# Patient Record
Sex: FEMALE | Race: WHITE | NOT HISPANIC OR LATINO | Employment: FULL TIME | URBAN - METROPOLITAN AREA
[De-identification: names, ages, dates, MRNs, and addresses within clinical notes are randomized per-mention and may not be internally consistent; named-entity substitution may affect disease eponyms.]

---

## 2017-08-12 ENCOUNTER — HOSPITAL ENCOUNTER (EMERGENCY)
Facility: HOSPITAL | Age: 77
Discharge: HOME | End: 2017-08-12
Attending: FAMILY MEDICINE
Payer: MEDICARE

## 2017-08-12 VITALS
RESPIRATION RATE: 16 BRPM | DIASTOLIC BLOOD PRESSURE: 85 MMHG | HEIGHT: 63 IN | OXYGEN SATURATION: 96 % | HEART RATE: 72 BPM | BODY MASS INDEX: 22.86 KG/M2 | WEIGHT: 129 LBS | SYSTOLIC BLOOD PRESSURE: 179 MMHG | TEMPERATURE: 98 F

## 2017-08-12 DIAGNOSIS — N39.0 URINARY TRACT INFECTION WITH HEMATURIA, SITE UNSPECIFIED: Primary | ICD-10-CM

## 2017-08-12 DIAGNOSIS — R31.9 URINARY TRACT INFECTION WITH HEMATURIA, SITE UNSPECIFIED: Primary | ICD-10-CM

## 2017-08-12 LAB
BACTERIA #/AREA URNS AUTO: ABNORMAL /HPF
BILIRUB UR QL STRIP: NEGATIVE
CLARITY UR REFRACT.AUTO: ABNORMAL
COLOR UR AUTO: ABNORMAL
GLUCOSE UR QL STRIP: NEGATIVE
HGB UR QL STRIP: ABNORMAL
HYALINE CASTS UR QL AUTO: 0 /LPF
KETONES UR QL STRIP: NEGATIVE
LEUKOCYTE ESTERASE UR QL STRIP: ABNORMAL
MICROSCOPIC COMMENT: ABNORMAL
NITRITE UR QL STRIP: NEGATIVE
PH UR STRIP: 5 [PH] (ref 5–8)
PROT UR QL STRIP: ABNORMAL
RBC #/AREA URNS AUTO: >100 /HPF (ref 0–4)
SP GR UR STRIP: 1.01 (ref 1–1.03)
URN SPEC COLLECT METH UR: ABNORMAL
UROBILINOGEN UR STRIP-ACNC: NEGATIVE EU/DL
WBC #/AREA URNS AUTO: >100 /HPF (ref 0–5)

## 2017-08-12 PROCEDURE — 81000 URINALYSIS NONAUTO W/SCOPE: CPT

## 2017-08-12 PROCEDURE — 25000003 PHARM REV CODE 250: Performed by: FAMILY MEDICINE

## 2017-08-12 PROCEDURE — 87086 URINE CULTURE/COLONY COUNT: CPT

## 2017-08-12 PROCEDURE — 99283 EMERGENCY DEPT VISIT LOW MDM: CPT | Mod: 25

## 2017-08-12 PROCEDURE — 96372 THER/PROPH/DIAG INJ SC/IM: CPT

## 2017-08-12 PROCEDURE — 63600175 PHARM REV CODE 636 W HCPCS: Performed by: FAMILY MEDICINE

## 2017-08-12 RX ORDER — PHENAZOPYRIDINE HYDROCHLORIDE 200 MG/1
200 TABLET, FILM COATED ORAL 3 TIMES DAILY
Qty: 10 TABLET | Refills: 0 | Status: SHIPPED | OUTPATIENT
Start: 2017-08-12 | End: 2017-08-23

## 2017-08-12 RX ORDER — CEFTRIAXONE 1 G/1
1 INJECTION, POWDER, FOR SOLUTION INTRAMUSCULAR; INTRAVENOUS
Status: COMPLETED | OUTPATIENT
Start: 2017-08-12 | End: 2017-08-12

## 2017-08-12 RX ORDER — SULFAMETHOXAZOLE AND TRIMETHOPRIM 800; 160 MG/1; MG/1
1 TABLET ORAL
Status: COMPLETED | OUTPATIENT
Start: 2017-08-12 | End: 2017-08-12

## 2017-08-12 RX ORDER — SULFAMETHOXAZOLE AND TRIMETHOPRIM 800; 160 MG/1; MG/1
1 TABLET ORAL 2 TIMES DAILY
Qty: 20 TABLET | Refills: 0 | Status: SHIPPED | OUTPATIENT
Start: 2017-08-12 | End: 2017-08-23

## 2017-08-12 RX ORDER — CEPHALEXIN 500 MG/1
500 CAPSULE ORAL EVERY 12 HOURS
COMMUNITY
End: 2017-08-23

## 2017-08-12 RX ADMIN — CEFTRIAXONE SODIUM 1 G: 1 INJECTION, POWDER, FOR SOLUTION INTRAMUSCULAR; INTRAVENOUS at 10:08

## 2017-08-12 RX ADMIN — SULFAMETHOXAZOLE AND TRIMETHOPRIM 1 TABLET: 800; 160 TABLET ORAL at 10:08

## 2017-08-12 NOTE — ED PROVIDER NOTES
"Encounter Date: 8/12/2017       History     Chief Complaint   Patient presents with    Hematuria     Pt states has been being treated for a "bladder infection" and yesterday started with increased pain to lower abdomen and lower back.  Reports noticed bright red- pink blood in urine this am.      Patient complains of bladder pressure with increased frequency since 2 days.  She also complains of low back pain since last night.  Symptoms have worsened this morning so she came to the ER for evaluation.  Patient says she was treated with antibiotics for UTI 2weeks  Ago by Keflex.  Patient symptoms have improved but recurred since last 2 days.  Patient denies any fever, chills, Rigour.  No nausea or vomiting.  Patient says she does have some blood in the urine and has seen by Dr. Santana in the past had CAT scan and cystoscopy and couldn't find nothing.  But the blood in the urine has increased this morning.      The history is provided by the patient.     Review of patient's allergies indicates:  No Known Allergies  Past Medical History:   Diagnosis Date    Atrial fibrillation     Hypertension      Past Surgical History:   Procedure Laterality Date    HYSTERECTOMY      TONSILLECTOMY       Family History   Problem Relation Age of Onset    Heart attack Father     Heart disease Brother     Heart disease Sister      Social History   Substance Use Topics    Smoking status: Never Smoker    Smokeless tobacco: Never Used    Alcohol use No     Review of Systems   Constitutional: Negative for activity change, chills and fever.   HENT: Negative for congestion and sore throat.    Eyes: Negative for pain, discharge, redness and itching.   Respiratory: Negative for cough, chest tightness, shortness of breath and wheezing.    Cardiovascular: Negative for chest pain and palpitations.   Gastrointestinal: Negative for abdominal distention, abdominal pain, diarrhea, nausea and vomiting.   Genitourinary: Positive for dysuria, " frequency, hematuria and urgency. Negative for decreased urine volume, difficulty urinating, flank pain, vaginal bleeding, vaginal discharge and vaginal pain.   Musculoskeletal: Negative for back pain.   Skin: Negative for rash.   Neurological: Negative for dizziness, light-headedness and headaches.   Psychiatric/Behavioral: Negative for confusion, decreased concentration and hallucinations. The patient is not nervous/anxious.    All other systems reviewed and are negative.      Physical Exam     Initial Vitals [08/12/17 0831]   BP Pulse Resp Temp SpO2   (!) 170/92 73 18 98.6 °F (37 °C) 96 %      MAP       118         Physical Exam    Nursing note and vitals reviewed.  Constitutional: She appears well-developed and well-nourished.   HENT:   Head: Normocephalic.   Right Ear: External ear normal.   Left Ear: External ear normal.   Nose: Nose normal.   Mouth/Throat: Oropharynx is clear and moist.   Eyes: Conjunctivae and EOM are normal. Pupils are equal, round, and reactive to light.   Neck: Normal range of motion.   Cardiovascular: Normal rate, regular rhythm, normal heart sounds and intact distal pulses.   Pulmonary/Chest: Breath sounds normal. No respiratory distress. She has no wheezes. She has no rhonchi. She has no rales. She exhibits no tenderness.   Abdominal: Soft. Bowel sounds are normal. She exhibits no distension. There is tenderness in the suprapubic area. There is no rigidity, no rebound, no guarding and no CVA tenderness.       Musculoskeletal: Normal range of motion.   Neurological: She is alert. She has normal strength and normal reflexes. She displays normal reflexes. No cranial nerve deficit or sensory deficit.   Skin: Skin is warm. Capillary refill takes less than 2 seconds.   Psychiatric: She has a normal mood and affect. Thought content normal.         ED Course   Procedures  Labs Reviewed   URINALYSIS - Abnormal; Notable for the following:        Result Value    Appearance, UA Cloudy (*)      Protein, UA 1+ (*)     Occult Blood UA 3+ (*)     Leukocytes, UA 3+ (*)     All other components within normal limits   URINALYSIS MICROSCOPIC - Abnormal; Notable for the following:     RBC, UA >100 (*)     WBC, UA >100 (*)     All other components within normal limits   CULTURE, URINE             Medical Decision Making:   ED Management:  Patient is treated for urinary tract infection in the ER with Rocephin injection and Bactrim.  Patient will be given prescription for Bactrim and a urine will be sent out for culture.  Patient is advised to follow-up with the primary care physician for culture report and sensitivity in 2-3 days.  Advised to follow-up ER in case if she develops fever with chills and worsening pain.  Patient understands and verbalizes plan.  No questions pending.                   ED Course     Clinical Impression:   The encounter diagnosis was Urinary tract infection with hematuria, site unspecified.    Disposition:   Disposition: Discharged  Condition: Bahsir Desai MD  08/13/17 2027

## 2017-08-13 LAB — BACTERIA UR CULT: NO GROWTH

## 2017-08-23 ENCOUNTER — OFFICE VISIT (OUTPATIENT)
Dept: OBSTETRICS AND GYNECOLOGY | Facility: CLINIC | Age: 77
End: 2017-08-23
Payer: MEDICARE

## 2017-08-23 VITALS
DIASTOLIC BLOOD PRESSURE: 74 MMHG | HEIGHT: 64 IN | SYSTOLIC BLOOD PRESSURE: 116 MMHG | BODY MASS INDEX: 21.68 KG/M2 | WEIGHT: 127 LBS

## 2017-08-23 DIAGNOSIS — Z12.31 OTHER SCREENING MAMMOGRAM: Primary | ICD-10-CM

## 2017-08-23 DIAGNOSIS — R19.00 PELVIC MASS: ICD-10-CM

## 2017-08-23 PROCEDURE — G0101 CA SCREEN;PELVIC/BREAST EXAM: HCPCS | Mod: S$GLB,,, | Performed by: OBSTETRICS & GYNECOLOGY

## 2017-08-23 PROCEDURE — 99999 PR PBB SHADOW E&M-EST. PATIENT-LVL III: CPT | Mod: PBBFAC,,, | Performed by: OBSTETRICS & GYNECOLOGY

## 2017-08-23 RX ORDER — ASPIRIN 81 MG/1
81 TABLET ORAL DAILY
COMMUNITY

## 2017-08-23 NOTE — PROGRESS NOTES
CC: Annual check-up    SUBJECTIVE:   76 y.o. female   for annual routine Pap and checkup. No LMP recorded. Patient has had a hysterectomy..  She has no unusual complaints.    PCP is Elgin Murphy    Past Medical History:   Diagnosis Date    Atrial fibrillation     Hypertension      Past Surgical History:   Procedure Laterality Date    HYSTERECTOMY      TONSILLECTOMY       Social History     Social History    Marital status:      Spouse name: N/A    Number of children: N/A    Years of education: N/A     Occupational History    Not on file.     Social History Main Topics    Smoking status: Never Smoker    Smokeless tobacco: Never Used    Alcohol use No    Drug use: No    Sexual activity: Not Currently     Other Topics Concern    Not on file     Social History Narrative    No narrative on file     Family History   Problem Relation Age of Onset    Heart attack Father     Heart disease Brother     Heart disease Sister      OB History    Para Term  AB Living   2 1 1   1     SAB TAB Ectopic Multiple Live Births   1              # Outcome Date GA Lbr Rigoberto/2nd Weight Sex Delivery Anes PTL Lv   2 SAB            1 Term      Vag-Spont               Current Outpatient Prescriptions   Medication Sig Dispense Refill    aspirin (ECOTRIN) 81 MG EC tablet Take 81 mg by mouth once daily.      atorvastatin (LIPITOR) 10 MG tablet Take 10 mg by mouth once daily.      calcium-vitamin D3 500 mg(1,250mg) -200 unit per tablet Take 1 tablet by mouth 2 (two) times daily with meals.      fish oil-omega-3 fatty acids 300-1,000 mg capsule Take 2 g by mouth once daily.      metoprolol succinate (TOPROL-XL) 100 MG 24 hr tablet Take 100 mg by mouth once daily.      multivitamin with minerals tablet Take 1 tablet by mouth once daily.      potassium chloride SA (K-DUR,KLOR-CON) 20 MEQ tablet       triamterene-hydrochlorothiazide 37.5-25 mg (MAXZIDE-25) 37.5-25 mg per tablet Take 1 tablet by  "mouth once daily.       No current facility-administered medications for this visit.      Allergies: Review of patient's allergies indicates no known allergies.     ROS:  Constitutional: no weight loss, weight gain, fever, fatigue  Eyes:  No vision changes, glasses/contacts  ENT/Mouth: No ulcers, sinus problems, ears ringing, headache  Cardiovascular: No inability to lie flat, chest pain, exercise intolerance, swelling, heart palpitations  Respiratory: No wheezing, coughing blood, shortness of breath, or cough  Gastrointestinal: No diarrhea, bloody stool, nausea/vomiting, constipation, gas, hemorrhoids  Genitourinary: No blood in urine, painful urination, urgency of urination, frequency of urination, incomplete emptying, incontinence, abnormal bleeding, painful periods, heavy periods, vaginal discharge, vaginal odor, painful intercourse, sexual problems, bleeding after intercourse.  Musculoskeletal: No muscle weakness  Skin/Breast: No painful breasts, nipple discharge, masses, rash, ulcers  Neurological: No passing out, seizures, numbness, headache  Endocrine: No diabetes, hypothyroid, hyperthyroid, hot flashes, hair loss, abnormal hair growth, ance  Psychiatric: No depression, crying  Hematologic: No bruises, bleeding, swollen lymph nodes, anemia.      OBJECTIVE:   The patient appears well, alert, oriented x 3, in no distress.  /74   Ht 5' 4" (1.626 m)   Wt 57.6 kg (127 lb)   BMI 21.80 kg/m²   NECK: no thyromegaly, trachea midline  SKIN: no acne, striae, hirsutism  BREAST EXAM: breasts appear normal, no suspicious masses, no skin or nipple changes or axillary nodes  ABDOMEN: no hernias, masses, or hepatosplenomegaly  GENITALIA: normal external genitalia, no erythema, no discharge  URETHRA: normal urethra, normal urethral meatus  VAGINA: Normal  CERVIX: no lesions or cervical motion tenderness  UTERUS: normal  ADNEXA: fullness at apex of vaginal cuff, constipation vs ovarian mass??      ASSESSMENT:   well " woman  1. Other screening mammogram    2. Pelvic mass        PLAN:   mammogram  additional lab tests per orders  return annually or prn  Orders Placed This Encounter    Mammo Digital Screening Bilat With CAD    US Pelvis Comp with Transvag NON-OB (xpd

## 2017-09-01 ENCOUNTER — HOSPITAL ENCOUNTER (OUTPATIENT)
Dept: RADIOLOGY | Facility: HOSPITAL | Age: 77
Discharge: HOME | End: 2017-09-01
Attending: OBSTETRICS & GYNECOLOGY
Payer: MEDICARE

## 2017-09-01 DIAGNOSIS — R19.00 PELVIC MASS: ICD-10-CM

## 2017-09-01 PROCEDURE — 76856 US EXAM PELVIC COMPLETE: CPT | Mod: TC,PO

## 2017-09-18 ENCOUNTER — HOSPITAL ENCOUNTER (OUTPATIENT)
Dept: RADIOLOGY | Facility: HOSPITAL | Age: 77
Discharge: HOME | End: 2017-09-18
Attending: OBSTETRICS & GYNECOLOGY
Payer: MEDICARE

## 2017-09-18 VITALS — HEIGHT: 64 IN | WEIGHT: 127 LBS | BODY MASS INDEX: 21.68 KG/M2

## 2017-09-18 DIAGNOSIS — Z12.31 OTHER SCREENING MAMMOGRAM: ICD-10-CM

## 2017-09-18 PROCEDURE — 77067 SCR MAMMO BI INCL CAD: CPT | Mod: TC

## 2018-09-05 ENCOUNTER — OFFICE VISIT (OUTPATIENT)
Dept: OBSTETRICS AND GYNECOLOGY | Facility: CLINIC | Age: 78
End: 2018-09-05
Payer: MEDICARE

## 2018-09-05 VITALS
BODY MASS INDEX: 22.09 KG/M2 | WEIGHT: 129.38 LBS | HEIGHT: 64 IN | SYSTOLIC BLOOD PRESSURE: 138 MMHG | DIASTOLIC BLOOD PRESSURE: 86 MMHG

## 2018-09-05 DIAGNOSIS — Z12.31 ENCOUNTER FOR SCREENING MAMMOGRAM FOR MALIGNANT NEOPLASM OF BREAST: ICD-10-CM

## 2018-09-05 DIAGNOSIS — N83.202 CYSTS OF BOTH OVARIES: Primary | ICD-10-CM

## 2018-09-05 DIAGNOSIS — Z01.419 WELL WOMAN EXAM WITH ROUTINE GYNECOLOGICAL EXAM: ICD-10-CM

## 2018-09-05 DIAGNOSIS — N83.201 CYSTS OF BOTH OVARIES: Primary | ICD-10-CM

## 2018-09-05 PROCEDURE — 99999 PR PBB SHADOW E&M-EST. PATIENT-LVL III: CPT | Mod: PBBFAC,,, | Performed by: OBSTETRICS & GYNECOLOGY

## 2018-09-05 PROCEDURE — G0101 CA SCREEN;PELVIC/BREAST EXAM: HCPCS | Mod: ,,, | Performed by: OBSTETRICS & GYNECOLOGY

## 2018-09-05 PROCEDURE — 99213 OFFICE O/P EST LOW 20 MIN: CPT | Mod: PBBFAC,PN | Performed by: OBSTETRICS & GYNECOLOGY

## 2018-09-05 RX ORDER — ERGOCALCIFEROL 1.25 MG/1
50000 CAPSULE ORAL
COMMUNITY
Start: 2018-08-20 | End: 2019-09-11

## 2018-09-05 RX ORDER — ALENDRONATE SODIUM 70 MG/1
TABLET ORAL
COMMUNITY
Start: 2018-08-20 | End: 2019-09-11

## 2018-09-05 NOTE — PROGRESS NOTES
CC: Annual check-up    SUBJECTIVE:   77 y.o. female   for annual routine Pap and checkup. No LMP recorded. Patient has had a hysterectomy..  She has no unusual complaints.    Had 1.1 and 1.5 simpl appearing cysts on ovaries last yr. Doing well, no complaints    Past Medical History:   Diagnosis Date    Atrial fibrillation     Hypertension      Past Surgical History:   Procedure Laterality Date    HYSTERECTOMY      TONSILLECTOMY       Social History     Socioeconomic History    Marital status:      Spouse name: Not on file    Number of children: Not on file    Years of education: Not on file    Highest education level: Not on file   Social Needs    Financial resource strain: Not on file    Food insecurity - worry: Not on file    Food insecurity - inability: Not on file    Transportation needs - medical: Not on file    Transportation needs - non-medical: Not on file   Occupational History    Not on file   Tobacco Use    Smoking status: Never Smoker    Smokeless tobacco: Never Used   Substance and Sexual Activity    Alcohol use: No    Drug use: No    Sexual activity: Not Currently   Other Topics Concern    Not on file   Social History Narrative    Not on file     Family History   Problem Relation Age of Onset    Heart attack Father     Heart disease Brother     Heart disease Sister      OB History    Para Term  AB Living   2 1 1   1     SAB TAB Ectopic Multiple Live Births   1              # Outcome Date GA Lbr Rigoberto/2nd Weight Sex Delivery Anes PTL Lv   2 SAB            1 Term      Vag-Spont               Current Outpatient Medications   Medication Sig Dispense Refill    alendronate (FOSAMAX) 70 MG tablet       aspirin (ECOTRIN) 81 MG EC tablet Take 81 mg by mouth once daily.      atorvastatin (LIPITOR) 10 MG tablet Take 10 mg by mouth once daily.      calcium-vitamin D3 500 mg(1,250mg) -200 unit per tablet Take 1 tablet by mouth 2 (two) times daily with meals.    "   fish oil-omega-3 fatty acids 300-1,000 mg capsule Take 2 g by mouth once daily.      metoprolol succinate (TOPROL-XL) 100 MG 24 hr tablet Take 100 mg by mouth once daily.      multivitamin with minerals tablet Take 1 tablet by mouth once daily.      potassium chloride SA (K-DUR,KLOR-CON) 20 MEQ tablet       triamterene-hydrochlorothiazide 37.5-25 mg (MAXZIDE-25) 37.5-25 mg per tablet Take 1 tablet by mouth once daily.      VITAMIN D2 50,000 unit capsule        No current facility-administered medications for this visit.      Allergies: Patient has no known allergies.     ROS:  Constitutional: no weight loss, weight gain, fever, fatigue  Eyes:  No vision changes, glasses/contacts  ENT/Mouth: No ulcers, sinus problems, ears ringing, headache  Cardiovascular: No inability to lie flat, chest pain, exercise intolerance, swelling, heart palpitations  Respiratory: No wheezing, coughing blood, shortness of breath, or cough  Gastrointestinal: No diarrhea, bloody stool, nausea/vomiting, constipation, gas, hemorrhoids  Genitourinary: No blood in urine, painful urination, urgency of urination, frequency of urination, incomplete emptying, incontinence, abnormal bleeding, painful periods, heavy periods, vaginal discharge, vaginal odor, painful intercourse, sexual problems, bleeding after intercourse.  Musculoskeletal: No muscle weakness  Skin/Breast: No painful breasts, nipple discharge, masses, rash, ulcers  Neurological: No passing out, seizures, numbness, headache  Endocrine: No diabetes, hypothyroid, hyperthyroid, hot flashes, hair loss, abnormal hair growth, ance  Psychiatric: No depression, crying  Hematologic: No bruises, bleeding, swollen lymph nodes, anemia.      OBJECTIVE:   The patient appears well, alert, oriented x 3, in no distress.  /86   Ht 5' 4" (1.626 m)   Wt 58.7 kg (129 lb 6.4 oz)   BMI 22.21 kg/m²   NECK: no thyromegaly, trachea midline  SKIN: no acne, striae, hirsutism  BREAST EXAM: breasts " appear normal, no suspicious masses, no skin or nipple changes or axillary nodes  ABDOMEN: no hernias, masses, or hepatosplenomegaly  GENITALIA: normal external genitalia, no erythema, no discharge  URETHRA: normal urethra, normal urethral meatus  VAGINA: mucosal atrophy  CERVIX: absent  UTERUS: uterus absent  ADNEXA: normal adnexa and no mass, fullness, tenderness      ASSESSMENT:   well woman  1. Cysts of both ovaries    2. Well woman exam with routine gynecological exam    3. Encounter for screening mammogram for malignant neoplasm of breast         PLAN:   mammogram  return annually or prn  Orders Placed This Encounter    Mammo Digital Screening Bilat with CAD    US Pelvis Comp with Transvag NON-OB (xpd

## 2018-09-20 ENCOUNTER — HOSPITAL ENCOUNTER (OUTPATIENT)
Dept: RADIOLOGY | Facility: HOSPITAL | Age: 78
Discharge: HOME | End: 2018-09-20
Attending: OBSTETRICS & GYNECOLOGY
Payer: MEDICARE

## 2018-09-20 DIAGNOSIS — N83.202 CYSTS OF BOTH OVARIES: ICD-10-CM

## 2018-09-20 DIAGNOSIS — N83.201 CYSTS OF BOTH OVARIES: ICD-10-CM

## 2018-09-20 DIAGNOSIS — Z12.31 ENCOUNTER FOR SCREENING MAMMOGRAM FOR MALIGNANT NEOPLASM OF BREAST: ICD-10-CM

## 2018-09-20 DIAGNOSIS — Z01.419 WELL WOMAN EXAM WITH ROUTINE GYNECOLOGICAL EXAM: ICD-10-CM

## 2018-09-20 PROCEDURE — 76830 TRANSVAGINAL US NON-OB: CPT | Mod: TC,PO

## 2018-09-20 PROCEDURE — 77063 BREAST TOMOSYNTHESIS BI: CPT | Mod: TC,PO

## 2018-09-20 PROCEDURE — 76856 US EXAM PELVIC COMPLETE: CPT | Mod: TC,PO

## 2019-09-11 ENCOUNTER — OFFICE VISIT (OUTPATIENT)
Dept: OBSTETRICS AND GYNECOLOGY | Facility: CLINIC | Age: 79
End: 2019-09-11
Payer: MEDICARE

## 2019-09-11 VITALS — BODY MASS INDEX: 22.49 KG/M2 | SYSTOLIC BLOOD PRESSURE: 140 MMHG | WEIGHT: 131 LBS | DIASTOLIC BLOOD PRESSURE: 80 MMHG

## 2019-09-11 DIAGNOSIS — Z01.419 WELL WOMAN EXAM WITH ROUTINE GYNECOLOGICAL EXAM: Primary | ICD-10-CM

## 2019-09-11 DIAGNOSIS — N83.201 CYSTS OF BOTH OVARIES: ICD-10-CM

## 2019-09-11 DIAGNOSIS — N83.202 CYSTS OF BOTH OVARIES: ICD-10-CM

## 2019-09-11 DIAGNOSIS — Z12.31 ENCOUNTER FOR SCREENING MAMMOGRAM FOR MALIGNANT NEOPLASM OF BREAST: ICD-10-CM

## 2019-09-11 PROCEDURE — 99999 PR PBB SHADOW E&M-EST. PATIENT-LVL III: CPT | Mod: PBBFAC,,, | Performed by: OBSTETRICS & GYNECOLOGY

## 2019-09-11 PROCEDURE — 99999 PR PBB SHADOW E&M-EST. PATIENT-LVL III: ICD-10-PCS | Mod: PBBFAC,,, | Performed by: OBSTETRICS & GYNECOLOGY

## 2019-09-11 PROCEDURE — G0101 PR CA SCREEN;PELVIC/BREAST EXAM: ICD-10-PCS | Mod: S$GLB,,, | Performed by: OBSTETRICS & GYNECOLOGY

## 2019-09-11 PROCEDURE — G0101 CA SCREEN;PELVIC/BREAST EXAM: HCPCS | Mod: S$GLB,,, | Performed by: OBSTETRICS & GYNECOLOGY

## 2019-09-11 NOTE — PROGRESS NOTES
CC: Annual check-up    SUBJECTIVE:   78 y.o. female   for annual routine Pap and checkup. No LMP recorded (lmp unknown). Patient has had a hysterectomy..  She has no unusual complaints.        Past Medical History:   Diagnosis Date    Atrial fibrillation     Hypertension      Past Surgical History:   Procedure Laterality Date    HYSTERECTOMY      TONSILLECTOMY       Social History     Socioeconomic History    Marital status:      Spouse name: Not on file    Number of children: Not on file    Years of education: Not on file    Highest education level: Not on file   Occupational History    Not on file   Social Needs    Financial resource strain: Not on file    Food insecurity:     Worry: Not on file     Inability: Not on file    Transportation needs:     Medical: Not on file     Non-medical: Not on file   Tobacco Use    Smoking status: Never Smoker    Smokeless tobacco: Never Used   Substance and Sexual Activity    Alcohol use: No    Drug use: No    Sexual activity: Not Currently   Lifestyle    Physical activity:     Days per week: Not on file     Minutes per session: Not on file    Stress: Not on file   Relationships    Social connections:     Talks on phone: Not on file     Gets together: Not on file     Attends [employer] service: Not on file     Active member of club or organization: Not on file     Attends meetings of clubs or organizations: Not on file     Relationship status: Not on file   Other Topics Concern    Not on file   Social History Narrative    Not on file     Family History   Problem Relation Age of Onset    Heart attack Father     Heart disease Brother     Heart disease Sister      OB History    Para Term  AB Living   2 1 0 1 1 1   SAB TAB Ectopic Multiple Live Births   1       1      # Outcome Date GA Lbr Rigoberto/2nd Weight Sex Delivery Anes PTL Lv   2 SAB            1      F Vag-Spont  Y IZZY         Current Outpatient Medications   Medication  Sig Dispense Refill    aspirin (ECOTRIN) 81 MG EC tablet Take 81 mg by mouth once daily.      atorvastatin (LIPITOR) 10 MG tablet Take 10 mg by mouth once daily.      calcium-vitamin D3 500 mg(1,250mg) -200 unit per tablet Take 1 tablet by mouth 2 (two) times daily with meals.      fish oil-omega-3 fatty acids 300-1,000 mg capsule Take 2 g by mouth once daily.      metoprolol succinate (TOPROL-XL) 100 MG 24 hr tablet Take 100 mg by mouth once daily.      multivitamin with minerals tablet Take 1 tablet by mouth once daily.      potassium chloride SA (K-DUR,KLOR-CON) 20 MEQ tablet Take 20 mEq by mouth once daily.       triamterene-hydrochlorothiazide 37.5-25 mg (MAXZIDE-25) 37.5-25 mg per tablet Take 1 tablet by mouth once daily.       No current facility-administered medications for this visit.      Allergies: Patient has no known allergies.     ROS:  Constitutional: no weight loss, weight gain, fever, fatigue  Eyes:  No vision changes, glasses/contacts  ENT/Mouth: No ulcers, sinus problems, ears ringing, headache  Cardiovascular: No inability to lie flat, chest pain, exercise intolerance, swelling, heart palpitations  Respiratory: No wheezing, coughing blood, shortness of breath, or cough  Gastrointestinal: No diarrhea, bloody stool, nausea/vomiting, constipation, gas, hemorrhoids  Genitourinary: No blood in urine, painful urination, urgency of urination, frequency of urination, incomplete emptying, incontinence, abnormal bleeding, painful periods, heavy periods, vaginal discharge, vaginal odor, painful intercourse, sexual problems, bleeding after intercourse.  Musculoskeletal: No muscle weakness  Skin/Breast: No painful breasts, nipple discharge, masses, rash, ulcers  Neurological: No passing out, seizures, numbness, headache  Endocrine: No diabetes, hypothyroid, hyperthyroid, hot flashes, hair loss, abnormal hair growth, ance  Psychiatric: No depression, crying  Hematologic: No bruises, bleeding, swollen  lymph nodes, anemia.      OBJECTIVE:   The patient appears well, alert, oriented x 3, in no distress.  BP (!) 140/80   Wt 59.4 kg (131 lb)   LMP  (LMP Unknown)   BMI 22.49 kg/m²   NECK: no thyromegaly, trachea midline  SKIN: no acne, striae, hirsutism  BREAST EXAM: breasts appear normal, no suspicious masses, no skin or nipple changes or axillary nodes  ABDOMEN: no hernias, masses, or hepatosplenomegaly  GENITALIA: normal external genitalia, no erythema, no discharge  URETHRA: normal urethra, normal urethral meatus  VAGINA: mucosal atrophy  CERVIX: absent  UTERUS: uterus absent  ADNEXA: no mass, fullness, tenderness      ASSESSMENT:   well woman  1. Well woman exam with routine gynecological exam    2. Cysts of both ovaries    3. Encounter for screening mammogram for malignant neoplasm of breast         PLAN:   mammogram  return annually or prn  Orders Placed This Encounter    Mammo Digital Screening Bilat w/ Joe    US Pelvis Comp with Transvag NON-OB (xpd

## 2019-09-23 ENCOUNTER — HOSPITAL ENCOUNTER (OUTPATIENT)
Dept: RADIOLOGY | Facility: HOSPITAL | Age: 79
Discharge: HOME | End: 2019-09-23
Attending: OBSTETRICS & GYNECOLOGY
Payer: MEDICARE

## 2019-09-23 DIAGNOSIS — Z01.419 WELL WOMAN EXAM WITH ROUTINE GYNECOLOGICAL EXAM: ICD-10-CM

## 2019-09-23 DIAGNOSIS — N83.202 CYSTS OF BOTH OVARIES: ICD-10-CM

## 2019-09-23 DIAGNOSIS — N83.201 CYSTS OF BOTH OVARIES: ICD-10-CM

## 2019-09-23 DIAGNOSIS — Z12.31 ENCOUNTER FOR SCREENING MAMMOGRAM FOR MALIGNANT NEOPLASM OF BREAST: ICD-10-CM

## 2019-09-23 PROCEDURE — 76830 TRANSVAGINAL US NON-OB: CPT | Mod: TC,PO

## 2019-09-23 PROCEDURE — 77067 SCR MAMMO BI INCL CAD: CPT | Mod: TC,PO

## 2020-05-14 ENCOUNTER — HOSPITAL ENCOUNTER (EMERGENCY)
Facility: HOSPITAL | Age: 80
Discharge: HOME | End: 2020-05-14
Attending: EMERGENCY MEDICINE
Payer: MEDICARE

## 2020-05-14 VITALS
SYSTOLIC BLOOD PRESSURE: 191 MMHG | TEMPERATURE: 98 F | BODY MASS INDEX: 22.36 KG/M2 | WEIGHT: 131 LBS | DIASTOLIC BLOOD PRESSURE: 99 MMHG | HEIGHT: 64 IN | RESPIRATION RATE: 22 BRPM | HEART RATE: 74 BPM | OXYGEN SATURATION: 96 %

## 2020-05-14 DIAGNOSIS — R42 VERTIGO: Primary | ICD-10-CM

## 2020-05-14 DIAGNOSIS — M54.2 NECK PAIN ON LEFT SIDE: ICD-10-CM

## 2020-05-14 LAB
ALBUMIN SERPL BCP-MCNC: 4 G/DL (ref 3.5–5.2)
ALP SERPL-CCNC: 52 U/L (ref 55–135)
ALT SERPL W/O P-5'-P-CCNC: 9 U/L (ref 10–44)
ANION GAP SERPL CALC-SCNC: 8 MMOL/L (ref 8–16)
AST SERPL-CCNC: 18 U/L (ref 10–40)
BASOPHILS # BLD AUTO: 0.02 K/UL (ref 0–0.2)
BASOPHILS NFR BLD: 0.4 % (ref 0–1.9)
BILIRUB SERPL-MCNC: 0.5 MG/DL (ref 0.1–1)
BUN SERPL-MCNC: 16 MG/DL (ref 8–23)
CALCIUM SERPL-MCNC: 9.2 MG/DL (ref 8.7–10.5)
CHLORIDE SERPL-SCNC: 104 MMOL/L (ref 95–110)
CO2 SERPL-SCNC: 29 MMOL/L (ref 23–29)
CREAT SERPL-MCNC: 0.7 MG/DL (ref 0.5–1.4)
DIFFERENTIAL METHOD: ABNORMAL
EOSINOPHIL # BLD AUTO: 0.4 K/UL (ref 0–0.5)
EOSINOPHIL NFR BLD: 6.9 % (ref 0–8)
ERYTHROCYTE [DISTWIDTH] IN BLOOD BY AUTOMATED COUNT: 12.2 % (ref 11.5–14.5)
EST. GFR  (AFRICAN AMERICAN): >60 ML/MIN/1.73 M^2
EST. GFR  (NON AFRICAN AMERICAN): >60 ML/MIN/1.73 M^2
GLUCOSE SERPL-MCNC: 123 MG/DL (ref 70–110)
HCT VFR BLD AUTO: 39.8 % (ref 37–48.5)
HGB BLD-MCNC: 12.8 G/DL (ref 12–16)
IMM GRANULOCYTES # BLD AUTO: 0.01 K/UL (ref 0–0.04)
IMM GRANULOCYTES NFR BLD AUTO: 0.2 % (ref 0–0.5)
LYMPHOCYTES # BLD AUTO: 0.6 K/UL (ref 1–4.8)
LYMPHOCYTES NFR BLD: 10 % (ref 18–48)
MCH RBC QN AUTO: 29.9 PG (ref 27–31)
MCHC RBC AUTO-ENTMCNC: 32.2 G/DL (ref 32–36)
MCV RBC AUTO: 93 FL (ref 82–98)
MONOCYTES # BLD AUTO: 0.4 K/UL (ref 0.3–1)
MONOCYTES NFR BLD: 6.7 % (ref 4–15)
NEUTROPHILS # BLD AUTO: 4.2 K/UL (ref 1.8–7.7)
NEUTROPHILS NFR BLD: 75.8 % (ref 38–73)
NRBC BLD-RTO: 0 /100 WBC
PLATELET # BLD AUTO: 224 K/UL (ref 150–350)
PMV BLD AUTO: 9.1 FL (ref 9.2–12.9)
POTASSIUM SERPL-SCNC: 3.5 MMOL/L (ref 3.5–5.1)
PROT SERPL-MCNC: 6.6 G/DL (ref 6–8.4)
RBC # BLD AUTO: 4.28 M/UL (ref 4–5.4)
SODIUM SERPL-SCNC: 141 MMOL/L (ref 136–145)
TROPONIN I SERPL DL<=0.01 NG/ML-MCNC: <0.006 NG/ML (ref 0–0.03)
WBC # BLD AUTO: 5.52 K/UL (ref 3.9–12.7)

## 2020-05-14 PROCEDURE — 99285 EMERGENCY DEPT VISIT HI MDM: CPT | Mod: 25

## 2020-05-14 PROCEDURE — 96374 THER/PROPH/DIAG INJ IV PUSH: CPT

## 2020-05-14 PROCEDURE — 25000003 PHARM REV CODE 250: Performed by: EMERGENCY MEDICINE

## 2020-05-14 PROCEDURE — 84484 ASSAY OF TROPONIN QUANT: CPT

## 2020-05-14 PROCEDURE — 96375 TX/PRO/DX INJ NEW DRUG ADDON: CPT

## 2020-05-14 PROCEDURE — 80053 COMPREHEN METABOLIC PANEL: CPT

## 2020-05-14 PROCEDURE — 93005 ELECTROCARDIOGRAM TRACING: CPT

## 2020-05-14 PROCEDURE — 63600175 PHARM REV CODE 636 W HCPCS: Performed by: EMERGENCY MEDICINE

## 2020-05-14 PROCEDURE — 85025 COMPLETE CBC W/AUTO DIFF WBC: CPT

## 2020-05-14 RX ORDER — ACETAMINOPHEN 500 MG
1000 TABLET ORAL
Status: COMPLETED | OUTPATIENT
Start: 2020-05-14 | End: 2020-05-14

## 2020-05-14 RX ORDER — ONDANSETRON 2 MG/ML
4 INJECTION INTRAMUSCULAR; INTRAVENOUS
Status: COMPLETED | OUTPATIENT
Start: 2020-05-14 | End: 2020-05-14

## 2020-05-14 RX ORDER — LIDOCAINE 50 MG/G
1 PATCH TOPICAL DAILY
Qty: 30 PATCH | Refills: 0 | OUTPATIENT
Start: 2020-05-14 | End: 2023-12-27

## 2020-05-14 RX ORDER — MECLIZINE HYDROCHLORIDE 25 MG/1
25 TABLET ORAL
Status: DISCONTINUED | OUTPATIENT
Start: 2020-05-14 | End: 2020-05-14

## 2020-05-14 RX ORDER — METOCLOPRAMIDE HYDROCHLORIDE 5 MG/ML
10 INJECTION INTRAMUSCULAR; INTRAVENOUS
Status: COMPLETED | OUTPATIENT
Start: 2020-05-14 | End: 2020-05-14

## 2020-05-14 RX ORDER — LIDOCAINE 50 MG/G
1 PATCH TOPICAL
Status: DISCONTINUED | OUTPATIENT
Start: 2020-05-14 | End: 2020-05-14 | Stop reason: HOSPADM

## 2020-05-14 RX ORDER — MECLIZINE HYDROCHLORIDE 25 MG/1
25 TABLET ORAL 3 TIMES DAILY PRN
Qty: 20 TABLET | Refills: 0 | Status: SHIPPED | OUTPATIENT
Start: 2020-05-14

## 2020-05-14 RX ORDER — DIPHENHYDRAMINE HYDROCHLORIDE 50 MG/ML
25 INJECTION INTRAMUSCULAR; INTRAVENOUS
Status: COMPLETED | OUTPATIENT
Start: 2020-05-14 | End: 2020-05-14

## 2020-05-14 RX ADMIN — SODIUM CHLORIDE 100 ML: 9 INJECTION, SOLUTION INTRAVENOUS at 02:05

## 2020-05-14 RX ADMIN — LIDOCAINE 1 PATCH: 50 PATCH TOPICAL at 03:05

## 2020-05-14 RX ADMIN — ACETAMINOPHEN 1000 MG: 500 TABLET ORAL at 03:05

## 2020-05-14 RX ADMIN — DIPHENHYDRAMINE HYDROCHLORIDE 25 MG: 50 INJECTION, SOLUTION INTRAMUSCULAR; INTRAVENOUS at 03:05

## 2020-05-14 RX ADMIN — METOCLOPRAMIDE 10 MG: 5 INJECTION, SOLUTION INTRAMUSCULAR; INTRAVENOUS at 02:05

## 2020-05-14 RX ADMIN — ONDANSETRON 4 MG: 2 INJECTION INTRAMUSCULAR; INTRAVENOUS at 03:05

## 2020-05-14 NOTE — ED NOTES
"Pt arrives via EMS w/ c/o dizziness and nausea since approximately 2300 today. Pts grandson called EMS. Pt has hx of vertigo. Pt also reports intermittent left sided neck pain that "feels like a pinched nerve", denies neck pain at present. Pt fell 1 week ago in driveway. Denies LOC or head trauma. Pt is AAOx3. Skin is warm and dry. Respirations even and unlabored. Speech is clear, and pt has full ROM in all extremities. NAD noted.  "

## 2020-05-14 NOTE — ED NOTES
"Pt reports nausea has improved, but she still "feels bad". When asked to describe the feeling, pt states she feels "blah".   "

## 2020-05-14 NOTE — ED NOTES
Pt called grandson for a ride home. Grandson did not answer. Pt will try to call again in a few minutes.

## 2020-05-14 NOTE — ED PROVIDER NOTES
Encounter Date: 5/14/2020    SCRIBE #1 NOTE: I, Marky Thompson, am scribing for, and in the presence of, Vahe Jones MD.       History     Chief Complaint   Patient presents with    Dizziness     79y F to ED via  EMS with c/o dizziness after rolling over in bed. h/o vertigo. pt also fell 1 week ago and thinks she strained her neck     Time seen by provider: 1:47 AM on 05/14/2020    79 year-old female brought to the ED by EMS due to dizziness that began prior to arrival. Patient reports while in bed, she rolled over and suddenly started to feel dizzy. Patient states she related this to her nephew who called 911. She admits to nausea but denies chest pain, SOB, or vomiting. Denies focal numbness/weakness. Patient reports history of vertigo but does not take medication for it because it occurs infrequently. Patient also mentions she had a fall sometime last week while bending over and began to have neck pain afterward. 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      Patient has a past medical history of Atrial fibrillation and Hypertension.     She has a past surgical history that includes Hysterectomy and Tonsillectomy.    The history is provided by the patient.     Review of patient's allergies indicates:  No Known Allergies  Past Medical History:   Diagnosis Date    Atrial fibrillation     Hypertension      Past Surgical History:   Procedure Laterality Date    HYSTERECTOMY      TONSILLECTOMY       Family History   Problem Relation Age of Onset    Heart attack Father     Heart disease Brother     Heart disease Sister      Social History     Tobacco Use    Smoking status: Never Smoker    Smokeless tobacco: Never Used   Substance Use Topics    Alcohol use: No    Drug use: No     Review of Systems   Constitutional: Negative for chills and fever.   Respiratory: Negative for shortness of breath.    Cardiovascular: Negative for chest pain.   Gastrointestinal: Positive for nausea. Negative for vomiting.    Musculoskeletal: Positive for neck pain.   Neurological: Positive for dizziness. Negative for weakness and numbness.   All other systems reviewed and are negative.      Physical Exam     Initial Vitals [05/14/20 0148]   BP Pulse Resp Temp SpO2   (!) 177/91 81 17 98.1 °F (36.7 °C) 96 %      MAP       --         Physical Exam    Nursing note and vitals reviewed.  Constitutional: She appears well-developed and well-nourished.   HENT:   Head: Normocephalic and atraumatic.   Eyes: EOM are normal. Pupils are equal, round, and reactive to light.   Left sided nystagmus   Neck: Normal range of motion. Neck supple.   Cardiovascular: Normal rate, regular rhythm, normal heart sounds and intact distal pulses.   Pulmonary/Chest: Breath sounds normal. No respiratory distress. She has no wheezes.   Abdominal: Soft. She exhibits no distension. There is no tenderness.   Musculoskeletal: Normal range of motion. She exhibits no edema.   Neurological: She is alert and oriented to person, place, and time. She has normal strength. No cranial nerve deficit. GCS score is 15. GCS eye subscore is 4. GCS verbal subscore is 5. GCS motor subscore is 6.   Skin: Skin is warm and dry.         ED Course   Procedures  Labs Reviewed   CBC W/ AUTO DIFFERENTIAL - Abnormal; Notable for the following components:       Result Value    MPV 9.1 (*)     Lymph # 0.6 (*)     Gran% 75.8 (*)     Lymph% 10.0 (*)     All other components within normal limits   COMPREHENSIVE METABOLIC PANEL - Abnormal; Notable for the following components:    Glucose 123 (*)     Alkaline Phosphatase 52 (*)     ALT 9 (*)     All other components within normal limits   TROPONIN I          Imaging Results          CT Head Without Contrast (Final result)  Result time 05/14/20 03:04:45    Final result by Mateus Cohen MD (05/14/20 03:04:45)                 Impression:      No CT evidence of acute intracranial abnormality.    Mild generalized cerebral volume loss and mild chronic  "microvascular ischemic disease.      Electronically signed by: Mateus Cohen MD  Date:    05/14/2020  Time:    03:04             Narrative:    EXAMINATION:  CT HEAD WITHOUT CONTRAST    CLINICAL HISTORY:  Headache, acute, norm neuro exam;    TECHNIQUE:  Low dose axial images were obtained through the head.  Coronal and sagittal reformations were also performed. Contrast was not administered.    COMPARISON:  None.    FINDINGS:  No evidence of acute territorial infarct, hemorrhage, mass effect, or midline shift.    Mild generalized cerebral volume loss.  Ventricles are normal in size without evidence of hydrocephalus.  Mild patchy and confluent periventricular and subcortical white matter hypoattenuation most consistent with chronic microvascular ischemic disease in this age group.    No displaced calvarial fracture.    Visualized paranasal sinuses and mastoid air cells are clear.  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  Prominent partially calcified retro odontoid pannus without severe central canal stenosis.                                 Medical Decision Making:   Initial Assessment:   This is a 79-year-old female, history of hypertension, hyperlipidemia, vertigo in presents the ER for evaluation of left-sided neck pain and vertigo.  Patient reportedly fell last week, mechanical fall no loss of conscious able to ambulate afterwards.  She reported on Thursday she had left-sided neck pain.  And then she reported today she turned her neck earlier today and felt dizzy.  No chest pain, shortness of breath, nausea vomiting fever chills.  Patient reports this is not the worst episode of vertigo she has had previously.  Patient reports she came to the ER because she is concerned "a may have a brain tumor ".  When asked why patient is unsure she stated that anything is possible and she is really concerned that she has a brain tumor.  She is neurologically intact, with nystagmus noted on " physical exam.  She is otherwise neurologically intact equal strength in all extremities cranial nerves intact.  I did a bedside ultrasound of her bilateral carotid arteries to assess for possible dissection given neck pain, and dizziness.  Bedside ultrasound was negative.  Differential includes central versus peripheral vertigo, electrolyte disturbance, dehydration, less likely carotid artery dissection intracranial mass.  Will obtain blood work symptomatic control CT imaging will reassess.  Likely plan discharge if CT negative, and the symptoms improved..    Clinical Tests:   Lab Tests: Reviewed and Ordered  Radiological Study: Ordered and Reviewed  Medical Tests: Reviewed and Ordered              Attending Attestation:           Physician Attestation for Scribe:  Physician Attestation Statement for Scribe #1: I, Vahe Jones MD, reviewed documentation, as scribed by Marky Thompson in my presence, and it is both accurate and complete.                 ED Course as of May 14 0444   Thu May 14, 2020   0216 EKG normal sinus rhythm 74 beats per minute, left axis deviation, right bundle branch block, no ST elevation    [SE]   0312 Resting in bed, no distress, labs imaging unremarkable patient still complaining of minor dizziness.  Will give 2nd round of medication will reassess and likely plan discharge.  Updated patient with plan.    [SE]   0341 Resting comfortably in bed, no acute distress, patient reports he feels better which to go home.  Able tolerate oral intake.  Labs imaging negative for acute process.  Patient likely suffering from vertigo as well as cervical strain from her fall.  Discussed with patient, symptomaticly has improved greatly.  Also noted to be hypertensive, she reports she is due to take her antihypertensive medications in the morning.  She will do it in the morning.  Will plan to discharge home, strict return precautions, and primary care follow-up.  Patient understood and agreed with plan and  patient will be discharged.    [SE]      ED Course User Index  [SE] Vahe Jones MD                Clinical Impression:       ICD-10-CM ICD-9-CM   1. Vertigo R42 780.4   2. Neck pain on left side M54.2 723.1           Disposition:   Disposition: Discharged  Condition: Stable     ED Disposition Condition    Discharge         ED Prescriptions     Medication Sig Dispense Start Date End Date Auth. Provider    meclizine (ANTIVERT) 25 mg tablet Take 1 tablet (25 mg total) by mouth 3 (three) times daily as needed. 20 tablet 5/14/2020  Vahe Jones MD    lidocaine (LIDODERM) 5 % Place 1 patch onto the skin once daily. Remove & Discard patch within 12 hours or as directed by MD 30 patch 5/14/2020  Vahe Jones MD        Follow-up Information     Follow up With Specialties Details Why Contact Info    Elgin Lewis Jr., MD Radiology Call in 1 day  [address] [facility] [address]  [phone]                                       Vahe Jones MD  05/14/20 3310

## 2020-09-16 ENCOUNTER — OFFICE VISIT (OUTPATIENT)
Dept: OBSTETRICS AND GYNECOLOGY | Facility: CLINIC | Age: 80
End: 2020-09-16
Payer: MEDICARE

## 2020-09-16 VITALS — SYSTOLIC BLOOD PRESSURE: 140 MMHG | WEIGHT: 126 LBS | BODY MASS INDEX: 21.63 KG/M2 | DIASTOLIC BLOOD PRESSURE: 82 MMHG

## 2020-09-16 DIAGNOSIS — N83.202 CYSTS OF BOTH OVARIES: ICD-10-CM

## 2020-09-16 DIAGNOSIS — N83.201 CYSTS OF BOTH OVARIES: ICD-10-CM

## 2020-09-16 DIAGNOSIS — Z01.419 WELL WOMAN EXAM WITH ROUTINE GYNECOLOGICAL EXAM: Primary | ICD-10-CM

## 2020-09-16 DIAGNOSIS — Z12.31 ENCOUNTER FOR SCREENING MAMMOGRAM FOR MALIGNANT NEOPLASM OF BREAST: ICD-10-CM

## 2020-09-16 PROCEDURE — 99999 PR PBB SHADOW E&M-EST. PATIENT-LVL III: CPT | Mod: PBBFAC,,, | Performed by: OBSTETRICS & GYNECOLOGY

## 2020-09-16 PROCEDURE — 99999 PR PBB SHADOW E&M-EST. PATIENT-LVL III: ICD-10-PCS | Mod: PBBFAC,,, | Performed by: OBSTETRICS & GYNECOLOGY

## 2020-09-16 PROCEDURE — G0101 PR CA SCREEN;PELVIC/BREAST EXAM: ICD-10-PCS | Mod: S$GLB,,, | Performed by: OBSTETRICS & GYNECOLOGY

## 2020-09-16 PROCEDURE — G0101 CA SCREEN;PELVIC/BREAST EXAM: HCPCS | Mod: S$GLB,,, | Performed by: OBSTETRICS & GYNECOLOGY

## 2020-09-16 NOTE — PROGRESS NOTES
CC: Annual check-up    SUBJECTIVE:   79 y.o. female   for annual routine Pap and checkup. No LMP recorded (lmp unknown). Patient has had a hysterectomy..  She has no unusual complaints.        Past Medical History:   Diagnosis Date    Atrial fibrillation     Hypertension      Past Surgical History:   Procedure Laterality Date    HYSTERECTOMY      TONSILLECTOMY       Social History     Socioeconomic History    Marital status:      Spouse name: Not on file    Number of children: Not on file    Years of education: Not on file    Highest education level: Not on file   Occupational History    Not on file   Social Needs    Financial resource strain: Not on file    Food insecurity     Worry: Not on file     Inability: Not on file    Transportation needs     Medical: Not on file     Non-medical: Not on file   Tobacco Use    Smoking status: Never Smoker    Smokeless tobacco: Never Used   Substance and Sexual Activity    Alcohol use: No    Drug use: No    Sexual activity: Not Currently   Lifestyle    Physical activity     Days per week: Not on file     Minutes per session: Not on file    Stress: Not on file   Relationships    Social connections     Talks on phone: Not on file     Gets together: Not on file     Attends [employer] service: Not on file     Active member of club or organization: Not on file     Attends meetings of clubs or organizations: Not on file     Relationship status: Not on file   Other Topics Concern    Not on file   Social History Narrative    Not on file     Family History   Problem Relation Age of Onset    Heart attack Father     Heart disease Brother     Heart disease Sister      OB History    Para Term  AB Living   2 1 0 1 1 1   SAB TAB Ectopic Multiple Live Births   1       1      # Outcome Date GA Lbr Rigoberto/2nd Weight Sex Delivery Anes PTL Lv   2 SAB            1      F Vag-Spont  Y IZZY         Current Outpatient Medications   Medication Sig  Dispense Refill    aspirin (ECOTRIN) 81 MG EC tablet Take 81 mg by mouth once daily.      atorvastatin (LIPITOR) 10 MG tablet Take 10 mg by mouth once daily.      calcium-vitamin D3 500 mg(1,250mg) -200 unit per tablet Take 1 tablet by mouth 2 (two) times daily with meals.      fish oil-omega-3 fatty acids 300-1,000 mg capsule Take 2 g by mouth once daily.      lidocaine (LIDODERM) 5 % Place 1 patch onto the skin once daily. Remove & Discard patch within 12 hours or as directed by MD 30 patch 0    meclizine (ANTIVERT) 25 mg tablet Take 1 tablet (25 mg total) by mouth 3 (three) times daily as needed. 20 tablet 0    metoprolol succinate (TOPROL-XL) 100 MG 24 hr tablet Take 100 mg by mouth once daily.      multivitamin with minerals tablet Take 1 tablet by mouth once daily.      potassium chloride SA (K-DUR,KLOR-CON) 20 MEQ tablet Take 20 mEq by mouth once daily.       triamterene-hydrochlorothiazide 37.5-25 mg (MAXZIDE-25) 37.5-25 mg per tablet Take 1 tablet by mouth once daily.       No current facility-administered medications for this visit.      Allergies: Patient has no known allergies.     ROS:  Constitutional: no weight loss, weight gain, fever, fatigue  Eyes:  No vision changes, glasses/contacts  ENT/Mouth: No ulcers, sinus problems, ears ringing, headache  Cardiovascular: No inability to lie flat, chest pain, exercise intolerance, swelling, heart palpitations  Respiratory: No wheezing, coughing blood, shortness of breath, or cough  Gastrointestinal: No diarrhea, bloody stool, nausea/vomiting, constipation, gas, hemorrhoids  Genitourinary: No blood in urine, painful urination, urgency of urination, frequency of urination, incomplete emptying, incontinence, abnormal bleeding, painful periods, heavy periods, vaginal discharge, vaginal odor, painful intercourse, sexual problems, bleeding after intercourse.  Musculoskeletal: No muscle weakness  Skin/Breast: No painful breasts, nipple discharge, masses,  rash, ulcers  Neurological: No passing out, seizures, numbness, headache  Endocrine: No diabetes, hypothyroid, hyperthyroid, hot flashes, hair loss, abnormal hair growth, ance  Psychiatric: No depression, crying  Hematologic: No bruises, bleeding, swollen lymph nodes, anemia.      OBJECTIVE:   The patient appears well, alert, oriented x 3, in no distress.  BP (!) 140/82   Wt 57.2 kg (126 lb)   LMP  (LMP Unknown)   BMI 21.63 kg/m²   NECK: no thyromegaly, trachea midline  SKIN: no acne, striae, hirsutism  BREAST EXAM: breasts appear normal, no suspicious masses, no skin or nipple changes or axillary nodes  ABDOMEN: no hernias, masses, or hepatosplenomegaly  GENITALIA: normal external genitalia, no erythema, no discharge  URETHRA: normal urethra, normal urethral meatus  VAGINA: mucosal atrophy  CERVIX: absent  UTERUS: uterus absent  ADNEXA: normal adnexa and no mass, fullness, tenderness  EXAMINATION:  US PELVIS COMP WITH TRANSVAG NON-OB (XPD)     CLINICAL HISTORY:  Unspecified ovarian cyst, right side     FINDINGS:  Comparison study 09/20/2018.  Transabdominal and transvaginal pelvic ultrasound performed.     Hysterectomy.     Right ovary, 2.6 x 1.8 x 2.2 cm, with a small cyst, approximately 2.1 x 1.2 x 1.7 cm (previously 1.3 x 1.1 x 0.6 cm).     Left ovary 2.9 x 1.4 x 1.1 cm, with several small follicles, largest 0.7 x 0.6 x 0.5 cm.  Previously there was a dominant 1.1 cm left ovarian follicle.     No adnexal mass.  No pelvic free fluid.     Impression:     There are few left ovarian follicles.  There is a small right ovarian cyst, 2.1 cm.  Hysterectomy.  No adnexal mass or pelvic free fluid.        Electronically signed by: Stuart Loyola MD  Date:                                            09/23/2019  Time:                                           10:50            ASSESSMENT:   well woman  1. Well woman exam with routine gynecological exam    2. Cysts of both ovaries        PLAN:   mammogram  Pelvic u/s  return  annually or prn  Orders Placed This Encounter    US Pelvis Complete Non OB

## 2020-09-25 ENCOUNTER — HOSPITAL ENCOUNTER (OUTPATIENT)
Dept: RADIOLOGY | Facility: HOSPITAL | Age: 80
Discharge: HOME | End: 2020-09-25
Attending: OBSTETRICS & GYNECOLOGY
Payer: MEDICARE

## 2020-09-25 DIAGNOSIS — Z01.419 WELL WOMAN EXAM WITH ROUTINE GYNECOLOGICAL EXAM: ICD-10-CM

## 2020-09-25 DIAGNOSIS — N83.201 CYSTS OF BOTH OVARIES: ICD-10-CM

## 2020-09-25 DIAGNOSIS — N83.202 CYSTS OF BOTH OVARIES: ICD-10-CM

## 2020-09-25 DIAGNOSIS — Z12.31 ENCOUNTER FOR SCREENING MAMMOGRAM FOR MALIGNANT NEOPLASM OF BREAST: ICD-10-CM

## 2020-09-25 PROCEDURE — 77067 SCR MAMMO BI INCL CAD: CPT | Mod: TC,PO

## 2020-09-25 PROCEDURE — 76830 TRANSVAGINAL US NON-OB: CPT | Mod: TC,PO

## 2020-11-03 ENCOUNTER — HOSPITAL ENCOUNTER (OUTPATIENT)
Dept: RADIOLOGY | Facility: HOSPITAL | Age: 80
Discharge: HOME | End: 2020-11-03
Attending: OTOLARYNGOLOGY
Payer: MEDICARE

## 2020-11-03 DIAGNOSIS — H92.02 OTALGIA, LEFT EAR: ICD-10-CM

## 2020-11-03 PROCEDURE — 70480 CT ORBIT/EAR/FOSSA W/O DYE: CPT | Mod: TC,PO

## 2021-10-11 ENCOUNTER — OFFICE VISIT (OUTPATIENT)
Dept: OBSTETRICS AND GYNECOLOGY | Facility: CLINIC | Age: 81
End: 2021-10-11
Payer: MEDICARE

## 2021-10-11 VITALS
HEIGHT: 64 IN | WEIGHT: 117 LBS | SYSTOLIC BLOOD PRESSURE: 130 MMHG | DIASTOLIC BLOOD PRESSURE: 82 MMHG | BODY MASS INDEX: 19.97 KG/M2

## 2021-10-11 DIAGNOSIS — N83.201 CYSTS OF BOTH OVARIES: Primary | ICD-10-CM

## 2021-10-11 DIAGNOSIS — N83.202 CYSTS OF BOTH OVARIES: Primary | ICD-10-CM

## 2021-10-11 DIAGNOSIS — Z12.31 ENCOUNTER FOR SCREENING MAMMOGRAM FOR MALIGNANT NEOPLASM OF BREAST: ICD-10-CM

## 2021-10-11 DIAGNOSIS — Z01.419 WELL WOMAN EXAM WITH ROUTINE GYNECOLOGICAL EXAM: ICD-10-CM

## 2021-10-11 PROCEDURE — 3079F DIAST BP 80-89 MM HG: CPT | Mod: CPTII,S$GLB,, | Performed by: OBSTETRICS & GYNECOLOGY

## 2021-10-11 PROCEDURE — 1126F PR PAIN SEVERITY QUANTIFIED, NO PAIN PRESENT: ICD-10-PCS | Mod: CPTII,S$GLB,, | Performed by: OBSTETRICS & GYNECOLOGY

## 2021-10-11 PROCEDURE — 1126F AMNT PAIN NOTED NONE PRSNT: CPT | Mod: CPTII,S$GLB,, | Performed by: OBSTETRICS & GYNECOLOGY

## 2021-10-11 PROCEDURE — 3079F PR MOST RECENT DIASTOLIC BLOOD PRESSURE 80-89 MM HG: ICD-10-PCS | Mod: CPTII,S$GLB,, | Performed by: OBSTETRICS & GYNECOLOGY

## 2021-10-11 PROCEDURE — 99999 PR PBB SHADOW E&M-EST. PATIENT-LVL III: CPT | Mod: PBBFAC,,, | Performed by: OBSTETRICS & GYNECOLOGY

## 2021-10-11 PROCEDURE — G0101 PR CA SCREEN;PELVIC/BREAST EXAM: ICD-10-PCS | Mod: GZ,S$GLB,, | Performed by: OBSTETRICS & GYNECOLOGY

## 2021-10-11 PROCEDURE — G0101 CA SCREEN;PELVIC/BREAST EXAM: HCPCS | Mod: GZ,S$GLB,, | Performed by: OBSTETRICS & GYNECOLOGY

## 2021-10-11 PROCEDURE — 1101F PR PT FALLS ASSESS DOC 0-1 FALLS W/OUT INJ PAST YR: ICD-10-PCS | Mod: CPTII,S$GLB,, | Performed by: OBSTETRICS & GYNECOLOGY

## 2021-10-11 PROCEDURE — 1101F PT FALLS ASSESS-DOCD LE1/YR: CPT | Mod: CPTII,S$GLB,, | Performed by: OBSTETRICS & GYNECOLOGY

## 2021-10-11 PROCEDURE — 3075F SYST BP GE 130 - 139MM HG: CPT | Mod: CPTII,S$GLB,, | Performed by: OBSTETRICS & GYNECOLOGY

## 2021-10-11 PROCEDURE — 3288F PR FALLS RISK ASSESSMENT DOCUMENTED: ICD-10-PCS | Mod: CPTII,S$GLB,, | Performed by: OBSTETRICS & GYNECOLOGY

## 2021-10-11 PROCEDURE — 99999 PR PBB SHADOW E&M-EST. PATIENT-LVL III: ICD-10-PCS | Mod: PBBFAC,,, | Performed by: OBSTETRICS & GYNECOLOGY

## 2021-10-11 PROCEDURE — 3288F FALL RISK ASSESSMENT DOCD: CPT | Mod: CPTII,S$GLB,, | Performed by: OBSTETRICS & GYNECOLOGY

## 2021-10-11 PROCEDURE — 3075F PR MOST RECENT SYSTOLIC BLOOD PRESS GE 130-139MM HG: ICD-10-PCS | Mod: CPTII,S$GLB,, | Performed by: OBSTETRICS & GYNECOLOGY

## 2021-10-11 PROCEDURE — 1159F MED LIST DOCD IN RCRD: CPT | Mod: CPTII,S$GLB,, | Performed by: OBSTETRICS & GYNECOLOGY

## 2021-10-11 PROCEDURE — 1159F PR MEDICATION LIST DOCUMENTED IN MEDICAL RECORD: ICD-10-PCS | Mod: CPTII,S$GLB,, | Performed by: OBSTETRICS & GYNECOLOGY

## 2021-10-22 ENCOUNTER — HOSPITAL ENCOUNTER (OUTPATIENT)
Dept: RADIOLOGY | Facility: HOSPITAL | Age: 81
Discharge: HOME | End: 2021-10-22
Attending: OBSTETRICS & GYNECOLOGY
Payer: MEDICARE

## 2021-10-22 DIAGNOSIS — Z01.419 WELL WOMAN EXAM WITH ROUTINE GYNECOLOGICAL EXAM: ICD-10-CM

## 2021-10-22 DIAGNOSIS — Z12.31 ENCOUNTER FOR SCREENING MAMMOGRAM FOR MALIGNANT NEOPLASM OF BREAST: ICD-10-CM

## 2021-10-22 DIAGNOSIS — N83.202 CYSTS OF BOTH OVARIES: ICD-10-CM

## 2021-10-22 DIAGNOSIS — N83.201 CYSTS OF BOTH OVARIES: ICD-10-CM

## 2021-10-22 PROCEDURE — 77067 SCR MAMMO BI INCL CAD: CPT | Mod: TC,PO

## 2021-10-22 PROCEDURE — 76856 US EXAM PELVIC COMPLETE: CPT | Mod: TC,PO

## 2022-10-14 ENCOUNTER — TELEPHONE (OUTPATIENT)
Dept: OBSTETRICS AND GYNECOLOGY | Facility: CLINIC | Age: 82
End: 2022-10-14
Payer: MEDICARE

## 2022-11-17 ENCOUNTER — OFFICE VISIT (OUTPATIENT)
Dept: OBSTETRICS AND GYNECOLOGY | Facility: CLINIC | Age: 82
End: 2022-11-17
Payer: MEDICARE

## 2022-11-17 VITALS
HEIGHT: 64 IN | WEIGHT: 118.81 LBS | BODY MASS INDEX: 20.28 KG/M2 | SYSTOLIC BLOOD PRESSURE: 162 MMHG | DIASTOLIC BLOOD PRESSURE: 77 MMHG

## 2022-11-17 DIAGNOSIS — Z12.31 ENCOUNTER FOR SCREENING MAMMOGRAM FOR MALIGNANT NEOPLASM OF BREAST: ICD-10-CM

## 2022-11-17 DIAGNOSIS — Z01.419 WELL WOMAN EXAM WITH ROUTINE GYNECOLOGICAL EXAM: Primary | ICD-10-CM

## 2022-11-17 PROCEDURE — 3288F PR FALLS RISK ASSESSMENT DOCUMENTED: ICD-10-PCS | Mod: CPTII,S$GLB,, | Performed by: OBSTETRICS & GYNECOLOGY

## 2022-11-17 PROCEDURE — 99999 PR PBB SHADOW E&M-EST. PATIENT-LVL III: ICD-10-PCS | Mod: PBBFAC,,, | Performed by: OBSTETRICS & GYNECOLOGY

## 2022-11-17 PROCEDURE — 1159F MED LIST DOCD IN RCRD: CPT | Mod: CPTII,S$GLB,, | Performed by: OBSTETRICS & GYNECOLOGY

## 2022-11-17 PROCEDURE — 3078F DIAST BP <80 MM HG: CPT | Mod: CPTII,S$GLB,, | Performed by: OBSTETRICS & GYNECOLOGY

## 2022-11-17 PROCEDURE — 1159F PR MEDICATION LIST DOCUMENTED IN MEDICAL RECORD: ICD-10-PCS | Mod: CPTII,S$GLB,, | Performed by: OBSTETRICS & GYNECOLOGY

## 2022-11-17 PROCEDURE — G0101 PR CA SCREEN;PELVIC/BREAST EXAM: ICD-10-PCS | Mod: GZ,S$GLB,, | Performed by: OBSTETRICS & GYNECOLOGY

## 2022-11-17 PROCEDURE — 1160F RVW MEDS BY RX/DR IN RCRD: CPT | Mod: CPTII,S$GLB,, | Performed by: OBSTETRICS & GYNECOLOGY

## 2022-11-17 PROCEDURE — 3288F FALL RISK ASSESSMENT DOCD: CPT | Mod: CPTII,S$GLB,, | Performed by: OBSTETRICS & GYNECOLOGY

## 2022-11-17 PROCEDURE — 1126F AMNT PAIN NOTED NONE PRSNT: CPT | Mod: CPTII,S$GLB,, | Performed by: OBSTETRICS & GYNECOLOGY

## 2022-11-17 PROCEDURE — 1160F PR REVIEW ALL MEDS BY PRESCRIBER/CLIN PHARMACIST DOCUMENTED: ICD-10-PCS | Mod: CPTII,S$GLB,, | Performed by: OBSTETRICS & GYNECOLOGY

## 2022-11-17 PROCEDURE — 99999 PR PBB SHADOW E&M-EST. PATIENT-LVL III: CPT | Mod: PBBFAC,,, | Performed by: OBSTETRICS & GYNECOLOGY

## 2022-11-17 PROCEDURE — 3077F PR MOST RECENT SYSTOLIC BLOOD PRESSURE >= 140 MM HG: ICD-10-PCS | Mod: CPTII,S$GLB,, | Performed by: OBSTETRICS & GYNECOLOGY

## 2022-11-17 PROCEDURE — 1101F PR PT FALLS ASSESS DOC 0-1 FALLS W/OUT INJ PAST YR: ICD-10-PCS | Mod: CPTII,S$GLB,, | Performed by: OBSTETRICS & GYNECOLOGY

## 2022-11-17 PROCEDURE — 3077F SYST BP >= 140 MM HG: CPT | Mod: CPTII,S$GLB,, | Performed by: OBSTETRICS & GYNECOLOGY

## 2022-11-17 PROCEDURE — 3078F PR MOST RECENT DIASTOLIC BLOOD PRESSURE < 80 MM HG: ICD-10-PCS | Mod: CPTII,S$GLB,, | Performed by: OBSTETRICS & GYNECOLOGY

## 2022-11-17 PROCEDURE — G0101 CA SCREEN;PELVIC/BREAST EXAM: HCPCS | Mod: GZ,S$GLB,, | Performed by: OBSTETRICS & GYNECOLOGY

## 2022-11-17 PROCEDURE — 1126F PR PAIN SEVERITY QUANTIFIED, NO PAIN PRESENT: ICD-10-PCS | Mod: CPTII,S$GLB,, | Performed by: OBSTETRICS & GYNECOLOGY

## 2022-11-17 PROCEDURE — 1101F PT FALLS ASSESS-DOCD LE1/YR: CPT | Mod: CPTII,S$GLB,, | Performed by: OBSTETRICS & GYNECOLOGY

## 2022-11-17 NOTE — PROGRESS NOTES
CC: Annual check-up    SUBJECTIVE:   81 y.o. female   for annual routine Pap and checkup. No LMP recorded (lmp unknown). Patient has had a hysterectomy..  She has no unusual complaints.    Happy birthday!!!      Past Medical History:   Diagnosis Date    Atrial fibrillation     Hypertension      Past Surgical History:   Procedure Laterality Date    HYSTERECTOMY      TONSILLECTOMY       Social History     Socioeconomic History    Marital status:    Tobacco Use    Smoking status: Never    Smokeless tobacco: Never   Substance and Sexual Activity    Alcohol use: No    Drug use: No    Sexual activity: Not Currently     Family History   Problem Relation Age of Onset    Heart attack Father     Heart disease Brother     Heart disease Sister      OB History    Para Term  AB Living   2 1 0 1 1 1   SAB IAB Ectopic Multiple Live Births   1       1      # Outcome Date GA Lbr Rigoberto/2nd Weight Sex Delivery Anes PTL Lv   2 SAB            1      F Vag-Spont  Y IZZY         Current Outpatient Medications   Medication Sig Dispense Refill    aspirin (ECOTRIN) 81 MG EC tablet Take 81 mg by mouth once daily.      atorvastatin (LIPITOR) 10 MG tablet Take 10 mg by mouth once daily.      calcium-vitamin D3 500 mg(1,250mg) -200 unit per tablet Take 1 tablet by mouth 2 (two) times daily with meals.      metoprolol succinate (TOPROL-XL) 100 MG 24 hr tablet Take 100 mg by mouth once daily.      multivitamin with minerals tablet Take 1 tablet by mouth once daily.      potassium chloride SA (K-DUR,KLOR-CON) 20 MEQ tablet Take 20 mEq by mouth once daily.       triamterene-hydrochlorothiazide 37.5-25 mg (MAXZIDE-25) 37.5-25 mg per tablet Take 1 tablet by mouth once daily.      fish oil-omega-3 fatty acids 300-1,000 mg capsule Take 2 g by mouth once daily.      lidocaine (LIDODERM) 5 % Place 1 patch onto the skin once daily. Remove & Discard patch within 12 hours or as directed by MD (Patient not taking: Reported on  "11/17/2022) 30 patch 0    meclizine (ANTIVERT) 25 mg tablet Take 1 tablet (25 mg total) by mouth 3 (three) times daily as needed. (Patient not taking: Reported on 11/17/2022) 20 tablet 0     No current facility-administered medications for this visit.     Allergies: Patient has no known allergies.     ROS:  Constitutional: no weight loss, weight gain, fever, fatigue  Eyes:  No vision changes, glasses/contacts  ENT/Mouth: No ulcers, sinus problems, ears ringing, headache  Cardiovascular: No inability to lie flat, chest pain, exercise intolerance, swelling, heart palpitations  Respiratory: No wheezing, coughing blood, shortness of breath, or cough  Gastrointestinal: No diarrhea, bloody stool, nausea/vomiting, constipation, gas, hemorrhoids  Genitourinary: No blood in urine, painful urination, urgency of urination, frequency of urination, incomplete emptying, incontinence, abnormal bleeding, painful periods, heavy periods, vaginal discharge, vaginal odor, painful intercourse, sexual problems, bleeding after intercourse.  Musculoskeletal: No muscle weakness  Skin/Breast: No painful breasts, nipple discharge, masses, rash, ulcers  Neurological: No passing out, seizures, numbness, headache  Endocrine: No diabetes, hypothyroid, hyperthyroid, hot flashes, hair loss, abnormal hair growth, ance  Psychiatric: No depression, crying  Hematologic: No bruises, bleeding, swollen lymph nodes, anemia.      OBJECTIVE:   The patient appears well, alert, oriented x 3, in no distress.  BP (!) 162/77 (BP Location: Left arm)   Ht 5' 4" (1.626 m)   Wt 53.9 kg (118 lb 13.3 oz)   LMP  (LMP Unknown)   BMI 20.40 kg/m²   NECK: no thyromegaly, trachea midline  SKIN: no acne, striae, hirsutism  BREAST EXAM: breasts appear normal, no suspicious masses, no skin or nipple changes or axillary nodes  ABDOMEN: no hernias, masses, or hepatosplenomegaly  GENITALIA: normal external genitalia, no erythema, no discharge  URETHRA: normal urethra, normal " urethral meatus  VAGINA: mucosal atrophy  CERVIX: absent  UTERUS: uterus absent  ADNEXA: no mass, fullness, tenderness      ASSESSMENT:   well woman  1. Well woman exam with routine gynecological exam    2. Encounter for screening mammogram for malignant neoplasm of breast        PLAN:   mammogram    return annually or prn  Orders Placed This Encounter    Mammo Digital Screening Bilat w/ Joe    Liquid-Based Pap Smear, Screening

## 2022-12-08 ENCOUNTER — HOSPITAL ENCOUNTER (OUTPATIENT)
Dept: RADIOLOGY | Facility: HOSPITAL | Age: 82
Discharge: HOME | End: 2022-12-08
Attending: OBSTETRICS & GYNECOLOGY
Payer: MEDICARE

## 2022-12-08 PROCEDURE — 77067 SCR MAMMO BI INCL CAD: CPT | Mod: TC,PO

## 2022-12-08 PROCEDURE — 77063 BREAST TOMOSYNTHESIS BI: CPT | Mod: TC,PO

## 2022-12-19 ENCOUNTER — TELEPHONE (OUTPATIENT)
Dept: OBSTETRICS AND GYNECOLOGY | Facility: CLINIC | Age: 82
End: 2022-12-19
Payer: MEDICARE

## 2022-12-19 ENCOUNTER — TELEPHONE (OUTPATIENT)
Dept: OBSTETRICS AND GYNECOLOGY | Facility: HOSPITAL | Age: 82
End: 2022-12-19
Payer: MEDICARE

## 2022-12-19 DIAGNOSIS — R92.8 ABNORMAL MAMMOGRAM: Primary | ICD-10-CM

## 2022-12-19 NOTE — TELEPHONE ENCOUNTER
----- Message from Gibson Moreno sent at 12/19/2022  9:58 AM CST -----  Contact: Pt  .Type:  Needs Medical Advice    Who Called: Pt  Would the patient rather a call back or a response via [facility]? Call  Best Call Back Number:  [phone]  Additional Information:   Pt is requesting a f/u appointment for mammogram.   Pt is stating there's no orders.  Pt is requesting to speak to a nurse, letter states further images.  Pt is requesting to get a call back before the end of the day, pt is concerned.

## 2022-12-19 NOTE — TELEPHONE ENCOUNTER
Informed pt that I have sent a message to Dr. Mirza to review mammogram regarding new orders. Pt verbalized understanding

## 2022-12-20 NOTE — TELEPHONE ENCOUNTER
----- Message from Janette Palmer MA sent at 12/19/2022 10:34 AM CST -----  Contact: Pt  Pt stated that she received a letter stating that she needs orders for additional images for mammogram. Please review mammogram and add orders for additional images.   ----- Message -----  From: Gibson Moreno  Sent: 12/19/2022  10:05 AM CST  To: [facility] Staff    .Type:  Needs Medical Advice    Who Called: Pt  Would the patient rather a call back or a response via [facility]? Call  Best Call Back Number:  [phone]  Additional Information:   Pt is requesting a f/u appointment for mammogram.   Pt is stating there's no orders.  Pt is requesting to speak to a nurse, letter states further images.  Pt is requesting to get a call back before the end of the day, pt is concerned.

## 2023-01-09 ENCOUNTER — HOSPITAL ENCOUNTER (OUTPATIENT)
Dept: RADIOLOGY | Facility: HOSPITAL | Age: 83
Discharge: HOME | End: 2023-01-09
Attending: OBSTETRICS & GYNECOLOGY
Payer: MEDICARE

## 2023-01-09 DIAGNOSIS — R92.8 ABNORMAL MAMMOGRAM: ICD-10-CM

## 2023-01-09 PROCEDURE — 77061 BREAST TOMOSYNTHESIS UNI: ICD-10-PCS | Mod: 26,,, | Performed by: RADIOLOGY

## 2023-01-09 PROCEDURE — 77065 MAMMO DIGITAL DIAGNOSTIC LEFT WITH TOMO: ICD-10-PCS | Mod: 26,LT,, | Performed by: RADIOLOGY

## 2023-01-09 PROCEDURE — 77065 DX MAMMO INCL CAD UNI: CPT | Mod: TC,PO,LT

## 2023-01-09 PROCEDURE — 77065 DX MAMMO INCL CAD UNI: CPT | Mod: 26,LT,, | Performed by: RADIOLOGY

## 2023-01-09 PROCEDURE — 77061 BREAST TOMOSYNTHESIS UNI: CPT | Mod: 26,,, | Performed by: RADIOLOGY

## 2023-12-27 ENCOUNTER — HOSPITAL ENCOUNTER (EMERGENCY)
Facility: HOSPITAL | Age: 83
Discharge: HOME | End: 2023-12-27
Attending: STUDENT IN AN ORGANIZED HEALTH CARE EDUCATION/TRAINING PROGRAM
Payer: MEDICARE

## 2023-12-27 VITALS
OXYGEN SATURATION: 95 % | DIASTOLIC BLOOD PRESSURE: 87 MMHG | RESPIRATION RATE: 18 BRPM | BODY MASS INDEX: 19.97 KG/M2 | TEMPERATURE: 98 F | SYSTOLIC BLOOD PRESSURE: 181 MMHG | HEIGHT: 64 IN | HEART RATE: 59 BPM | WEIGHT: 117 LBS
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
## 2023-12-27 DIAGNOSIS — S46.912A LEFT SHOULDER STRAIN, INITIAL ENCOUNTER: ICD-10-CM

## 2023-12-27 DIAGNOSIS — S20.212A RIB CONTUSION, LEFT, INITIAL ENCOUNTER: Primary | ICD-10-CM

## 2023-12-27 PROCEDURE — 99284 EMERGENCY DEPT VISIT MOD MDM: CPT | Mod: 25,ER

## 2023-12-27 RX ORDER — LIDOCAINE 50 MG/G
1 PATCH TOPICAL DAILY
Qty: 5 PATCH | Refills: 0 | Status: SHIPPED | OUTPATIENT
Start: 2023-12-27

## 2023-12-27 NOTE — ED PROVIDER NOTES
Encounter Date: 12/27/2023       History     Chief Complaint   Patient presents with    Fall     Left rib pain (chest area). Patient reports that's where she landed this am      83-year-old female presents to ED with concern left-sided shoulder and chest wall pain after mechanical fall that occurred this morning.  No head injury or LOC. no use of blood thinners.  Denying neck pain, back pain, lower extremity injuries.  No shortness a breath.  Pain worsened with deep inspiration.  No other acute complaints at this time.    The history is provided by the patient.     Review of patient's allergies indicates:  No Known Allergies  Past Medical History:   Diagnosis Date    Atrial fibrillation     Hypertension      Past Surgical History:   Procedure Laterality Date    HYSTERECTOMY      TONSILLECTOMY       Family History   Problem Relation Age of Onset    Heart attack Father     Heart disease Brother     Heart disease Sister      Social History     Tobacco Use    Smoking status: Never    Smokeless tobacco: Never   Substance Use Topics    Alcohol use: No    Drug use: No     Review of Systems   Respiratory:  Negative for cough and shortness of breath.    Cardiovascular:  Negative for palpitations and leg swelling.        Left chest wall pain   Musculoskeletal:  Positive for arthralgias.   Neurological:  Negative for dizziness, weakness, light-headedness, numbness and headaches.       Physical Exam     Initial Vitals [12/27/23 1148]   BP Pulse Resp Temp SpO2   (!) 181/87 (!) 59 18 98.1 °F (36.7 °C) 95 %      MAP       --         Physical Exam    Nursing note and vitals reviewed.  Constitutional: She appears well-developed and well-nourished. She is active. She does not have a sickly appearance. She does not appear ill. No distress.   HENT:   Head: Normocephalic and atraumatic.   Neck:   Normal range of motion.  Cardiovascular:  Normal rate and regular rhythm.           Pulmonary/Chest: Effort normal and breath sounds normal.    Left-sided anterior chest wall tenderness.  No visible skin changes or bruising.  Lungs are clear bilaterally.   Musculoskeletal:      Cervical back: Normal range of motion. No spinous process tenderness or muscular tenderness.     Neurological: She is alert. GCS eye subscore is 4. GCS verbal subscore is 5. GCS motor subscore is 6.   Skin: Skin is warm and dry.   Psychiatric: She has a normal mood and affect. Her speech is normal and behavior is normal.         ED Course   Procedures  Labs Reviewed - No data to display       Imaging Results              X-Ray Shoulder Trauma Left (Final result)  Result time 12/27/23 12:40:06      Final result by Mariano Crespo MD (Timothy) (12/27/23 12:40:06)                   Impression:      Negative exam.      Electronically signed by: Mariano Crespo MD  Date:    12/27/2023  Time:    12:40               Narrative:    EXAMINATION:  XR SHOULDER TRAUMA 3 VIEW LEFT    CLINICAL HISTORY:  Unspecified fall, initial encounter    TECHNIQUE:  Standard radiography performed.  Three views.    COMPARISON:  None    FINDINGS:  Bone density and architecture are normal.  No acute findings.                                       X-Ray Ribs 2 View Left (Final result)  Result time 12/27/23 12:39:22      Final result by Mariano Crespo MD (Timothy) (12/27/23 12:39:22)                   Impression:      Negative exam.      Electronically signed by: Mariano Crespo MD  Date:    12/27/2023  Time:    12:39               Narrative:    EXAMINATION:  XR RIBS 2 VIEW LEFT    CLINICAL HISTORY:  Unspecified fall, initial encounter, <Reason For Exam>    TECHNIQUE:  Standard rib series.  Two views.    COMPARISON:  None.    FINDINGS:  The rib views are negative.  No acute findings.                                       X-Ray Chest 1 View (Final result)  Result time 12/27/23 12:38:23      Final result by Mariano Crespo MD (Timothy) (12/27/23 12:38:23)                   Impression:      Clear  lungs.      Electronically signed by: Mariano Crespo MD  Date:    12/27/2023  Time:    12:38               Narrative:    EXAMINATION:  XR CHEST 1 VIEW    CLINICAL HISTORY:  Chest wall pain status post fall,    COMPARISON:  Comparison chest 10/16/2014.    FINDINGS:  Atherosclerosis of thoracic aorta.  Mild cardiomegaly.  Lungs appear clear.                                       Medications - No data to display  Medical Decision Making  Patient presents with concern of left-sided chest wall on left shoulder pain after mechanical fall that occurred this morning.  No shortness of breath.  Afebrile.  Reproducible left-sided chest wall and shoulder tenderness on exam with no obvious physical or palpable deformities.  Lungs are clear bilaterally.    DDx:  Including but not limited to fall, contusion, strain, sprain, contusion, dislocation, fracture    Amount and/or Complexity of Data Reviewed  Radiology: ordered.  ECG/medicine tests: ordered.     Details: EKG NSR, rate 89, no ST elevation.  No STEMI.               ED Course as of 12/27/23 1303   Wed Dec 27, 2023   1300 X-Ray Shoulder Trauma Left  No acute fractures noted per my interpretation.  Radiology reviewed [KS]   1301 X-Ray Ribs 2 View Left  No visible displaced rib fracture per my interpretation.  Reviewed by Radiology. [KS]   1301 X-Ray Chest 1 View  No acute cardiopulmonary findings per Radiology review. [KS]   1301 Patient continues remained well-appearing in ED.  Prescription written for Lidoderm patches.  Encouraged Tylenol/ibuprofen as needed, breathing exercises, close monitoring with close PCP follow-up.  ED return precautions were discussed.  Patient states her understanding and agrees with plan. [KS]      ED Course User Index  [KS] Hari Arriaza PA-C                           Clinical Impression:  Final diagnoses:  [W19.XXXA] Fall  [S20.212A] Rib contusion, left, initial encounter (Primary)  [S46.912A] Left shoulder strain, initial encounter           ED Disposition Condition    Discharge Stable          ED Prescriptions       Medication Sig Dispense Start Date End Date Auth. Provider    LIDOcaine (LIDODERM) 5 % Place 1 patch onto the skin once daily. Remove & Discard patch within 12 hours or as directed by MD 5 patch 12/27/2023 -- Hari Arriaza PA-C          Follow-up Information       Follow up With Specialties Details Why Contact Info    Elgin Lewis Jr., MD Radiology   [facility] [address]  [phone]               Hari Arriaza PA-C  12/27/23 8934

## 2023-12-27 NOTE — DISCHARGE INSTRUCTIONS

## 2024-01-03 ENCOUNTER — OFFICE VISIT (OUTPATIENT)
Dept: OBSTETRICS AND GYNECOLOGY | Facility: CLINIC | Age: 84
End: 2024-01-03
Payer: MEDICARE

## 2024-01-03 VITALS
SYSTOLIC BLOOD PRESSURE: 184 MMHG | HEIGHT: 64 IN | BODY MASS INDEX: 20.51 KG/M2 | WEIGHT: 120.13 LBS | DIASTOLIC BLOOD PRESSURE: 82 MMHG

## 2024-01-03 DIAGNOSIS — Z01.419 WELL WOMAN EXAM WITH ROUTINE GYNECOLOGICAL EXAM: Primary | ICD-10-CM

## 2024-01-03 DIAGNOSIS — Z12.31 ENCOUNTER FOR SCREENING MAMMOGRAM FOR MALIGNANT NEOPLASM OF BREAST: ICD-10-CM

## 2024-01-03 PROCEDURE — 3079F DIAST BP 80-89 MM HG: CPT | Mod: CPTII,S$GLB,, | Performed by: OBSTETRICS & GYNECOLOGY

## 2024-01-03 PROCEDURE — 3077F SYST BP >= 140 MM HG: CPT | Mod: CPTII,S$GLB,, | Performed by: OBSTETRICS & GYNECOLOGY

## 2024-01-03 PROCEDURE — 1159F MED LIST DOCD IN RCRD: CPT | Mod: CPTII,S$GLB,, | Performed by: OBSTETRICS & GYNECOLOGY

## 2024-01-03 PROCEDURE — 99999 PR PBB SHADOW E&M-EST. PATIENT-LVL III: CPT | Mod: PBBFAC,,, | Performed by: OBSTETRICS & GYNECOLOGY

## 2024-01-03 PROCEDURE — 1126F AMNT PAIN NOTED NONE PRSNT: CPT | Mod: CPTII,S$GLB,, | Performed by: OBSTETRICS & GYNECOLOGY

## 2024-01-03 PROCEDURE — 1160F RVW MEDS BY RX/DR IN RCRD: CPT | Mod: CPTII,S$GLB,, | Performed by: OBSTETRICS & GYNECOLOGY

## 2024-01-03 PROCEDURE — G0101 CA SCREEN;PELVIC/BREAST EXAM: HCPCS | Mod: GZ,S$GLB,, | Performed by: OBSTETRICS & GYNECOLOGY

## 2024-01-03 PROCEDURE — 3288F FALL RISK ASSESSMENT DOCD: CPT | Mod: CPTII,S$GLB,, | Performed by: OBSTETRICS & GYNECOLOGY

## 2024-01-03 PROCEDURE — 1101F PT FALLS ASSESS-DOCD LE1/YR: CPT | Mod: CPTII,S$GLB,, | Performed by: OBSTETRICS & GYNECOLOGY

## 2024-01-03 NOTE — PROGRESS NOTES
CC: Annual check-up    SUBJECTIVE:   83 y.o. female   for annual routine Pap and checkup. No LMP recorded (lmp unknown). Patient has had a hysterectomy..  She has no unusual complaints and fell down a week ago and bruised ribs on left side.        Past Medical History:   Diagnosis Date    Atrial fibrillation     Hypertension      Past Surgical History:   Procedure Laterality Date    HYSTERECTOMY      TONSILLECTOMY       Social History     Socioeconomic History    Marital status:    Tobacco Use    Smoking status: Never    Smokeless tobacco: Never   Substance and Sexual Activity    Alcohol use: No    Drug use: No    Sexual activity: Not Currently     Family History   Problem Relation Age of Onset    Heart attack Father     Heart disease Brother     Heart disease Sister      OB History    Para Term  AB Living   2 1 0 1 1 1   SAB IAB Ectopic Multiple Live Births   1       1      # Outcome Date GA Lbr Rigoberto/2nd Weight Sex Delivery Anes PTL Lv   2 SAB            1      F Vag-Spont  Y IZZY         Current Outpatient Medications   Medication Sig Dispense Refill    aspirin (ECOTRIN) 81 MG EC tablet Take 81 mg by mouth once daily.      atorvastatin (LIPITOR) 10 MG tablet Take 10 mg by mouth once daily.      calcium-vitamin D3 500 mg(1,250mg) -200 unit per tablet Take 1 tablet by mouth 2 (two) times daily with meals.      fish oil-omega-3 fatty acids 300-1,000 mg capsule Take 2 g by mouth once daily.      LIDOcaine (LIDODERM) 5 % Place 1 patch onto the skin once daily. Remove & Discard patch within 12 hours or as directed by MD 5 patch 0    meclizine (ANTIVERT) 25 mg tablet Take 1 tablet (25 mg total) by mouth 3 (three) times daily as needed. 20 tablet 0    metoprolol succinate (TOPROL-XL) 100 MG 24 hr tablet Take 100 mg by mouth once daily.      multivitamin with minerals tablet Take 1 tablet by mouth once daily.      potassium chloride SA (K-DUR,KLOR-CON) 20 MEQ tablet Take 20 mEq by mouth  "once daily.       triamterene-hydrochlorothiazide 37.5-25 mg (MAXZIDE-25) 37.5-25 mg per tablet Take 1 tablet by mouth once daily.       No current facility-administered medications for this visit.     Allergies: Patient has no known allergies.     ROS:  Constitutional: no weight loss, weight gain, fever, fatigue  Eyes:  No vision changes, glasses/contacts  ENT/Mouth: No ulcers, sinus problems, ears ringing, headache  Cardiovascular: No inability to lie flat, chest pain, exercise intolerance, swelling, heart palpitations  Respiratory: No wheezing, coughing blood, shortness of breath, or cough  Gastrointestinal: No diarrhea, bloody stool, nausea/vomiting, constipation, gas, hemorrhoids  Genitourinary: No blood in urine, painful urination, urgency of urination, frequency of urination, incomplete emptying, incontinence, abnormal bleeding, painful periods, heavy periods, vaginal discharge, vaginal odor, painful intercourse, sexual problems, bleeding after intercourse.  Musculoskeletal: No muscle weakness  Skin/Breast: No painful breasts, nipple discharge, masses, rash, ulcers  Neurological: No passing out, seizures, numbness, headache  Endocrine: No diabetes, hypothyroid, hyperthyroid, hot flashes, hair loss, abnormal hair growth, ance  Psychiatric: No depression, crying  Hematologic: No bruises, bleeding, swollen lymph nodes, anemia.      OBJECTIVE:   The patient appears well, alert, oriented x 3, in no distress.  BP (!) 184/82   Ht 5' 4" (1.626 m)   Wt 54.5 kg (120 lb 2.4 oz)   LMP  (LMP Unknown)   BMI 20.62 kg/m²   NECK: no thyromegaly, trachea midline  SKIN: no acne, striae, hirsutism  BREAST EXAM: breasts appear normal, no suspicious masses, no skin or nipple changes or axillary nodes  ABDOMEN: no hernias, masses, or hepatosplenomegaly  GENITALIA: normal external genitalia, no erythema, no discharge  URETHRA: normal urethra, normal urethral meatus  VAGINA: mucosal atrophy  CERVIX: absent  UTERUS: uterus " absent  ADNEXA: no mass, fullness, tenderness      ASSESSMENT:   well woman  1. Well woman exam with routine gynecological exam    2. Encounter for screening mammogram for malignant neoplasm of breast        PLAN:   mammogram  return annually or prn  Orders Placed This Encounter    Mammo Digital Screening Bilat w/ Joe

## 2024-03-11 ENCOUNTER — HOSPITAL ENCOUNTER (OUTPATIENT)
Dept: RADIOLOGY | Facility: HOSPITAL | Age: 84
Discharge: HOME | End: 2024-03-11
Attending: OBSTETRICS & GYNECOLOGY
Payer: MEDICARE

## 2024-03-11 DIAGNOSIS — Z12.31 ENCOUNTER FOR SCREENING MAMMOGRAM FOR MALIGNANT NEOPLASM OF BREAST: ICD-10-CM

## 2024-03-11 DIAGNOSIS — Z01.419 WELL WOMAN EXAM WITH ROUTINE GYNECOLOGICAL EXAM: ICD-10-CM

## 2024-03-11 PROCEDURE — 77067 SCR MAMMO BI INCL CAD: CPT | Mod: 26,,, | Performed by: RADIOLOGY

## 2024-03-11 PROCEDURE — 77063 BREAST TOMOSYNTHESIS BI: CPT | Mod: 26,,, | Performed by: RADIOLOGY

## 2024-03-11 PROCEDURE — 77067 SCR MAMMO BI INCL CAD: CPT | Mod: TC,PN

## 2024-06-22 ENCOUNTER — HOSPITAL ENCOUNTER (EMERGENCY)
Facility: HOSPITAL | Age: 84
Discharge: HOME | End: 2024-06-22
Attending: EMERGENCY MEDICINE
Payer: MEDICARE

## 2024-06-22 VITALS
WEIGHT: 120.13 LBS | DIASTOLIC BLOOD PRESSURE: 80 MMHG | TEMPERATURE: 98 F | OXYGEN SATURATION: 97 % | HEART RATE: 76 BPM | SYSTOLIC BLOOD PRESSURE: 149 MMHG | HEIGHT: 64 IN | RESPIRATION RATE: 20 BRPM | BODY MASS INDEX: 20.51 KG/M2

## 2024-06-22 DIAGNOSIS — R42 DIZZINESS: Primary | ICD-10-CM

## 2024-06-22 LAB
ALBUMIN SERPL BCP-MCNC: 3.8 G/DL (ref 3.5–5.2)
ALP SERPL-CCNC: 53 U/L (ref 55–135)
ALT SERPL W/O P-5'-P-CCNC: 14 U/L (ref 10–44)
ANION GAP SERPL CALC-SCNC: 12 MMOL/L (ref 8–16)
AST SERPL-CCNC: 21 U/L (ref 10–40)
BASOPHILS # BLD AUTO: 0.01 K/UL (ref 0–0.2)
BASOPHILS NFR BLD: 0.2 % (ref 0–1.9)
BILIRUB SERPL-MCNC: 1.3 MG/DL (ref 0.1–1)
BILIRUB UR QL STRIP: NEGATIVE
BNP SERPL-MCNC: 137 PG/ML (ref 0–99)
BUN SERPL-MCNC: 13 MG/DL (ref 8–23)
CALCIUM SERPL-MCNC: 9.2 MG/DL (ref 8.7–10.5)
CHLORIDE SERPL-SCNC: 105 MMOL/L (ref 95–110)
CLARITY UR: CLEAR
CO2 SERPL-SCNC: 23 MMOL/L (ref 23–29)
COLOR UR: COLORLESS
CREAT SERPL-MCNC: 0.7 MG/DL (ref 0.5–1.4)
DIFFERENTIAL METHOD BLD: ABNORMAL
EOSINOPHIL # BLD AUTO: 0 K/UL (ref 0–0.5)
EOSINOPHIL NFR BLD: 0.5 % (ref 0–8)
ERYTHROCYTE [DISTWIDTH] IN BLOOD BY AUTOMATED COUNT: 12.1 % (ref 11.5–14.5)
EST. GFR  (NO RACE VARIABLE): >60 ML/MIN/1.73 M^2
GLUCOSE SERPL-MCNC: 116 MG/DL (ref 70–110)
GLUCOSE UR QL STRIP: NEGATIVE
HCT VFR BLD AUTO: 39.1 % (ref 37–48.5)
HGB BLD-MCNC: 13.5 G/DL (ref 12–16)
HGB UR QL STRIP: NEGATIVE
IMM GRANULOCYTES # BLD AUTO: 0.02 K/UL (ref 0–0.04)
IMM GRANULOCYTES NFR BLD AUTO: 0.4 % (ref 0–0.5)
KETONES UR QL STRIP: ABNORMAL
LEUKOCYTE ESTERASE UR QL STRIP: NEGATIVE
LYMPHOCYTES # BLD AUTO: 0.4 K/UL (ref 1–4.8)
LYMPHOCYTES NFR BLD: 6.5 % (ref 18–48)
MAGNESIUM SERPL-MCNC: 1.9 MG/DL (ref 1.6–2.6)
MCH RBC QN AUTO: 31.7 PG (ref 27–31)
MCHC RBC AUTO-ENTMCNC: 34.5 G/DL (ref 32–36)
MCV RBC AUTO: 92 FL (ref 82–98)
MONOCYTES # BLD AUTO: 0.3 K/UL (ref 0.3–1)
MONOCYTES NFR BLD: 4.7 % (ref 4–15)
NEUTROPHILS # BLD AUTO: 4.8 K/UL (ref 1.8–7.7)
NEUTROPHILS NFR BLD: 87.7 % (ref 38–73)
NITRITE UR QL STRIP: NEGATIVE
NRBC BLD-RTO: 0 /100 WBC
PH UR STRIP: 8 [PH] (ref 5–8)
PLATELET # BLD AUTO: 288 K/UL (ref 150–450)
PMV BLD AUTO: 9.5 FL (ref 9.2–12.9)
POTASSIUM SERPL-SCNC: 3.3 MMOL/L (ref 3.5–5.1)
PROT SERPL-MCNC: 6.4 G/DL (ref 6–8.4)
PROT UR QL STRIP: NEGATIVE
RBC # BLD AUTO: 4.26 M/UL (ref 4–5.4)
SODIUM SERPL-SCNC: 140 MMOL/L (ref 136–145)
SP GR UR STRIP: 1.01 (ref 1–1.03)
TROPONIN I SERPL DL<=0.01 NG/ML-MCNC: <0.006 NG/ML (ref 0–0.03)
URN SPEC COLLECT METH UR: ABNORMAL
UROBILINOGEN UR STRIP-ACNC: NEGATIVE EU/DL
WBC # BLD AUTO: 5.51 K/UL (ref 3.9–12.7)

## 2024-06-22 PROCEDURE — 25000003 PHARM REV CODE 250: Performed by: EMERGENCY MEDICINE

## 2024-06-22 PROCEDURE — 99285 EMERGENCY DEPT VISIT HI MDM: CPT | Mod: 25

## 2024-06-22 PROCEDURE — 85025 COMPLETE CBC W/AUTO DIFF WBC: CPT | Performed by: EMERGENCY MEDICINE

## 2024-06-22 PROCEDURE — 93010 ELECTROCARDIOGRAM REPORT: CPT | Mod: ,,, | Performed by: INTERNAL MEDICINE

## 2024-06-22 PROCEDURE — 84484 ASSAY OF TROPONIN QUANT: CPT | Performed by: EMERGENCY MEDICINE

## 2024-06-22 PROCEDURE — 83880 ASSAY OF NATRIURETIC PEPTIDE: CPT | Performed by: EMERGENCY MEDICINE

## 2024-06-22 PROCEDURE — 81003 URINALYSIS AUTO W/O SCOPE: CPT | Performed by: EMERGENCY MEDICINE

## 2024-06-22 PROCEDURE — 80053 COMPREHEN METABOLIC PANEL: CPT | Performed by: EMERGENCY MEDICINE

## 2024-06-22 PROCEDURE — 93005 ELECTROCARDIOGRAM TRACING: CPT

## 2024-06-22 PROCEDURE — 83735 ASSAY OF MAGNESIUM: CPT | Performed by: EMERGENCY MEDICINE

## 2024-06-22 RX ORDER — MECLIZINE HYDROCHLORIDE 25 MG/1
25 TABLET ORAL
Status: COMPLETED | OUTPATIENT
Start: 2024-06-22 | End: 2024-06-22

## 2024-06-22 RX ORDER — MECLIZINE HYDROCHLORIDE 25 MG/1
25 TABLET ORAL 3 TIMES DAILY PRN
Qty: 15 TABLET | Refills: 0 | Status: SHIPPED | OUTPATIENT
Start: 2024-06-22

## 2024-06-22 RX ADMIN — MECLIZINE HYDROCHLORIDE 25 MG: 25 TABLET ORAL at 06:06

## 2024-06-22 NOTE — ED PROVIDER NOTES
Encounter Date: 6/22/2024       History     Chief Complaint   Patient presents with    Dizziness     Arrived via EMS from home with reports frontal HA and dizziness (she is spinning). Reports it feels like when she had vertigo. Does not take any medication for it. Nauseous with EMS, improved with zofran. A/o x 4 no neurological deficits. Denies vision changes.      Patient presents with dizziness since yesterday afternoon.  She states that movement and ambulation exacerbates the dizziness.  There is nausea component to it.  She states that she has a history of positional vertigo and this feels similar.  She denies any visual abnormalities or focal motor weakness.  She denies any sensation abnormalities.  She did not take any medications prior to arrival.  Denies any fevers/chills.    The history is provided by the patient.     Review of patient's allergies indicates:   Allergen Reactions    Amoxicillin Rash     Past Medical History:   Diagnosis Date    Atrial fibrillation     Hypertension      Past Surgical History:   Procedure Laterality Date    HYSTERECTOMY      TONSILLECTOMY       Family History   Problem Relation Name Age of Onset    Heart attack Father      Heart disease Brother      Heart disease Sister       Social History     Tobacco Use    Smoking status: Never    Smokeless tobacco: Never   Substance Use Topics    Alcohol use: No    Drug use: No     Review of Systems   Neurological:  Positive for dizziness.       Physical Exam     Initial Vitals [06/22/24 1738]   BP Pulse Resp Temp SpO2   (!) 173/99 80 16 97.4 °F (36.3 °C) 98 %      MAP       --         Physical Exam    Vitals reviewed.  Constitutional: She appears well-developed.   Eyes: EOM are normal. Pupils are equal, round, and reactive to light.   Neck:   No midline tenderness to palpation, step-offs, crepitus noted to the cervical spine, there is no neck rigidity or tenderness to palpation to the soft tissue areas of the neck/trapezius    Cardiovascular:  Normal rate and regular rhythm.           No murmur heard.  Pulmonary/Chest: Breath sounds normal. She has no wheezes.   Abdominal: Abdomen is soft. There is no abdominal tenderness.   Musculoskeletal:         General: Normal range of motion.     Neurological: She is alert and oriented to person, place, and time. She has normal strength. No sensory deficit.   No pronator drift, good finger-to-nose and heel-to-shin test         ED Course   Procedures  Labs Reviewed   CBC W/ AUTO DIFFERENTIAL - Abnormal; Notable for the following components:       Result Value    MCH 31.7 (*)     Lymph # 0.4 (*)     Gran % 87.7 (*)     Lymph % 6.5 (*)     All other components within normal limits   COMPREHENSIVE METABOLIC PANEL - Abnormal; Notable for the following components:    Potassium 3.3 (*)     Glucose 116 (*)     Total Bilirubin 1.3 (*)     Alkaline Phosphatase 53 (*)     All other components within normal limits   URINALYSIS, REFLEX TO URINE CULTURE - Abnormal; Notable for the following components:    Color, UA Colorless (*)     Ketones, UA 1+ (*)     All other components within normal limits    Narrative:     Specimen Source->Urine   B-TYPE NATRIURETIC PEPTIDE - Abnormal; Notable for the following components:     (*)     All other components within normal limits   MAGNESIUM   TROPONIN I     EKG Readings: (Independently Interpreted)   Sinus rhythm rate of 72, left axis, PVC noted, right bundle-branch block, nonspecific STT wave changes       Imaging Results              CT Head Without Contrast (Final result)  Result time 06/22/24 19:22:42      Final result by Luz Maria Farnsworth MD (06/22/24 19:22:42)                   Impression:      No acute intracranial abnormality.    Microvascular chronic ischemic changes.      Electronically signed by: Luz Maria Farnsworth  Date:    06/22/2024  Time:    19:22               Narrative:    EXAMINATION:  CT HEAD WITHOUT CONTRAST    CLINICAL HISTORY:  Dizziness,  persistent/recurrent, cardiac or vascular cause suspected;    TECHNIQUE:  Low dose axial images were obtained through the head.  Coronal and sagittal reformations were also performed. Contrast was not administered.    COMPARISON:  11/03/2020 CT brain    FINDINGS:  No acute intracranial intra or extra-axial hemorrhage or hematoma.  Gray-white matter junction differentiation is intact.  Mild patchy deep white matter low density as seen with microvascular chronic ischemic changes.  Imaged paranasal sinuses, mastoid air cells and middle ears are clear.  Bony calvarium is intact.                                       X-Ray Chest AP Portable (Final result)  Result time 06/22/24 19:16:20      Final result by Luz Maria Farnsworth MD (06/22/24 19:16:20)                   Impression:      No acute abnormality.      Electronically signed by: Luz Maria Farnsworth  Date:    06/22/2024  Time:    19:16               Narrative:    EXAMINATION:  XR CHEST AP PORTABLE    CLINICAL HISTORY:  dizziness;    TECHNIQUE:  Single frontal view of the chest was performed.    COMPARISON:  12/27/2023 chest x-ray    FINDINGS:  Cardiac silhouette is nonenlarged.  The aorta is tortuous.  The lungs appear clear.  There is no pleural effusion or pneumothorax.                                       Medications   meclizine tablet 25 mg (25 mg Oral Given 6/22/24 1839)     Medical Decision Making  Patient's symptoms have completely resolved meclizine, she has a fluids and the department with no difficulty and no assistance.  She is instructed to return immediately for any new or worsening symptoms and she verbalized understanding.    Amount and/or Complexity of Data Reviewed  Labs: ordered. Decision-making details documented in ED Course.     Details: BNP reviewed and is elevated  Radiology: ordered. Decision-making details documented in ED Course.    Risk  Risk Details: Differential diagnosis includes but is not limited to:  Peripheral vertigo, central  vertigo, intracranial hemorrhage, electrolyte abnormality, ACS, heart failure, dysrhythmia               ED Course as of 06/24/24 0013   Sat Jun 22, 2024 1911 CBC auto differential(!)  Nonspecific findings [CD]   1911 Comprehensive metabolic panel(!)  Nonspecific findings [CD]   1911 Troponin I  Within normal limits [CD]   1911 Magnesium  Within normal limits [CD]   2001 CT Head Without Contrast  No acute findings [CD]   2001 X-Ray Chest AP Portable  No acute findings [CD]      ED Course User Index  [CD] Raymundo Londono DO                           Clinical Impression:  Final diagnoses:  [R42] Dizziness (Primary)          ED Disposition Condition    Discharge           ED Prescriptions       Medication Sig Dispense Start Date End Date Auth. Provider    meclizine (ANTIVERT) 25 mg tablet Take 1 tablet (25 mg total) by mouth 3 (three) times daily as needed for Dizziness. 15 tablet 6/22/2024 -- Raymundo Londono DO          Follow-up Information       Follow up With Specialties Details Why Contact Info    Elgin Lewis Jr., MD Radiology Schedule an appointment as soon as possible for a visit   [facility] [address]  [phone]               Raymundo Londono DO  06/24/24 0015

## 2024-06-23 NOTE — ED NOTES
Sat patient up and ambulated with assistance. She reports significant improvement of dizziness with activity and no nausea currently. Tolerating saltines and ice water. MD updated.

## 2024-06-23 NOTE — ED NOTES
Assumed care of patient. Awake and resting quietly without distress. Family member at bedside. Pt. A/O x4. She reports Vertigo symptoms since yesterday including dizziness that she describes as spinning and nausea. Symptoms are exacerbated by movement and activity. She describes as similar to previous episodes of Vertigo. Symptoms are minimal currently lying still in supine position.    No

## 2024-06-25 LAB
OHS QRS DURATION: 140 MS
OHS QTC CALCULATION: 492 MS

## 2024-07-24 ENCOUNTER — CLINICAL SUPPORT (OUTPATIENT)
Dept: OTOLARYNGOLOGY | Facility: CLINIC | Age: 84
End: 2024-07-24
Payer: MEDICARE

## 2024-07-24 ENCOUNTER — OFFICE VISIT (OUTPATIENT)
Dept: OTOLARYNGOLOGY | Facility: CLINIC | Age: 84
End: 2024-07-24
Payer: MEDICARE

## 2024-07-24 VITALS
HEART RATE: 74 BPM | WEIGHT: 120.13 LBS | SYSTOLIC BLOOD PRESSURE: 169 MMHG | BODY MASS INDEX: 20.62 KG/M2 | DIASTOLIC BLOOD PRESSURE: 78 MMHG

## 2024-07-24 DIAGNOSIS — H91.93 HIGH FREQUENCY HEARING LOSS OF BOTH EARS: ICD-10-CM

## 2024-07-24 DIAGNOSIS — R42 DIZZINESS: ICD-10-CM

## 2024-07-24 DIAGNOSIS — R42 VERTIGO: Primary | ICD-10-CM

## 2024-07-24 DIAGNOSIS — H61.23 BILATERAL IMPACTED CERUMEN: ICD-10-CM

## 2024-07-24 DIAGNOSIS — H91.93 HIGH FREQUENCY HEARING LOSS OF BOTH EARS: Primary | ICD-10-CM

## 2024-07-24 PROCEDURE — 3288F FALL RISK ASSESSMENT DOCD: CPT | Mod: CPTII,S$GLB,, | Performed by: NURSE PRACTITIONER

## 2024-07-24 PROCEDURE — 1126F AMNT PAIN NOTED NONE PRSNT: CPT | Mod: CPTII,S$GLB,, | Performed by: NURSE PRACTITIONER

## 2024-07-24 PROCEDURE — 69210 REMOVE IMPACTED EAR WAX UNI: CPT | Mod: S$GLB,,, | Performed by: NURSE PRACTITIONER

## 2024-07-24 PROCEDURE — 92557 COMPREHENSIVE HEARING TEST: CPT | Mod: S$GLB,,,

## 2024-07-24 PROCEDURE — 1159F MED LIST DOCD IN RCRD: CPT | Mod: CPTII,S$GLB,, | Performed by: NURSE PRACTITIONER

## 2024-07-24 PROCEDURE — 99204 OFFICE O/P NEW MOD 45 MIN: CPT | Mod: 25,S$GLB,, | Performed by: NURSE PRACTITIONER

## 2024-07-24 PROCEDURE — 92567 TYMPANOMETRY: CPT | Mod: S$GLB,,,

## 2024-07-24 PROCEDURE — 3077F SYST BP >= 140 MM HG: CPT | Mod: CPTII,S$GLB,, | Performed by: NURSE PRACTITIONER

## 2024-07-24 PROCEDURE — 99999 PR PBB SHADOW E&M-EST. PATIENT-LVL III: CPT | Mod: PBBFAC,,, | Performed by: NURSE PRACTITIONER

## 2024-07-24 PROCEDURE — 1160F RVW MEDS BY RX/DR IN RCRD: CPT | Mod: CPTII,S$GLB,, | Performed by: NURSE PRACTITIONER

## 2024-07-24 PROCEDURE — 1101F PT FALLS ASSESS-DOCD LE1/YR: CPT | Mod: CPTII,S$GLB,, | Performed by: NURSE PRACTITIONER

## 2024-07-24 PROCEDURE — 3078F DIAST BP <80 MM HG: CPT | Mod: CPTII,S$GLB,, | Performed by: NURSE PRACTITIONER

## 2024-07-24 NOTE — PROCEDURES
Ear Cerumen Removal    Date/Time: 7/24/2024 10:00 AM    Performed by: Nicky Sanchez NP  Authorized by: Nicky Sanchez NP    Consent Done?:  Yes (Verbal)    Local anesthetic:  None  Location details:  Both ears  Procedure type: curette    Cerumen  Removal Results:  Cerumen completely removed  Patient tolerance:  Patient tolerated the procedure well with no immediate complications

## 2024-07-24 NOTE — PROGRESS NOTES
Chief Complaint   Patient presents with    Dizziness    Ear Fullness     Left ear only        HPI:   The patient who is referred to me by Dr. Raymundo Londono in consultation for evaluation of dizziness. She went to the ED on 6/22/2024. CT head showed no acute findings. She has a history of positional vertigo int he past. These recent episodes started about a month ago and have essentially resolved. The sensation is also described as: spinning sensation.The attacks occur  once  and last a few  hours . Positions that worsen symptoms:  looking down .  Associated ear symptoms: none. Associated CNS symptoms: none. Recent infections: none. Head trauma: denied. Drug ingestion prior to onset: none. Noise exposure: no occupational exposure.Previous workup: CT scan of head . Previous treatment includes: meclizine  Response to this treatment has been fair   Patient reports a history of migraine headaches.    Past Medical History:   Diagnosis Date    Atrial fibrillation     Hypertension      Social History     Socioeconomic History    Marital status:    Tobacco Use    Smoking status: Never    Smokeless tobacco: Never   Substance and Sexual Activity    Alcohol use: No    Drug use: No    Sexual activity: Not Currently     Social Determinants of Health     Financial Resource Strain: Low Risk  (7/31/2023)    Received from [facility]    Overall Financial Resource Strain (CARDIA)     Difficulty of Paying Living Expenses: Not hard at all   Food Insecurity: No Food Insecurity (7/31/2023)    Received from [facility]    Hunger Vital Sign     Worried About Running Out of Food in the Last Year: Never true     Ran Out of Food in the Last Year: Never true   Transportation Needs: No Transportation Needs (7/31/2023)    Received from [facility]    PRAPARE - Transportation     Lack of Transportation (Medical): No     Lack of Transportation (Non-Medical): No   Physical Activity: Unknown  (7/31/2023)    Received from [facility]    Exercise Vital Sign     Days of Exercise per Week: 0 days   Stress: No Stress Concern Present (7/31/2023)    [facility]    [place] [facility] [employer] - Occupational Stress Questionnaire     Feeling of Stress : Not at all   Housing Stability: Unknown (7/31/2023)    [facility]    Housing Stability Vital Sign     Unable to Pay for Housing in the Last Year: No     In the last 12 months, was there a time when you did not have a steady place to sleep or slept in a shelter (including now)?: No     Past Surgical History:   Procedure Laterality Date    HYSTERECTOMY      TONSILLECTOMY       Family History   Problem Relation Name Age of Onset    Heart attack Father      Heart disease Brother      Heart disease Sister             Review of Systems  General: negative for chills, fever or weight loss  Psychological: negative for mood changes or depression  Ophthalmic: negative for blurry vision, photophobia or eye pain  ENT: see HPI  Respiratory: no cough, shortness of breath, or wheezing  Cardiovascular: no chest pain or dyspnea on exertion  Gastrointestinal: no abdominal pain, change in bowel habits, or black/ bloody stools  Musculoskeletal: negative for gait disturbance or muscular weakness  Neurological: no syncope or seizures; no ataxia  Dermatological: negative for pruritis,  rash and jaundice  Hematologic/lymphatic: no easy bruising, no new adenopathy    Physical Exam:    Vitals:    07/24/24 0953   BP: (!) 169/78   Pulse: 74       Constitutional: Well appearing / communicating without difficutly.  NAD.  Eyes: EOM I Bilaterally  Head/Face: Normocephalic.  Negative paranasal sinus pressure/tenderness.  Salivary glands WNL.  House Brackmann I Bilaterally.    Right Ear: Auricle normal appearance. External Auditory Canal with cerumen impaction. EAC within normal limits no lesions or masses,TM w/o  masses/lesions/perforations. TM mobility noted.   Left Ear: Auricle normal appearance. External Auditory Canal with cerumen impaction. EAC within normal limits no lesions or masses,TM w/o masses/lesions/perforations. TM mobility noted.  Vestibular exam: negativehead thrust; negative Fukuda step test; negative Romberg; negative Right Dixx- Hallpike; negative left Dixx- Hallpike  Nose: No gross nasal septal deviation. Inferior Turbinates 3+ bilaterally. No septal perforation. No masses/lesions. External nasal skin appears normal without masses/lesions.  Oral Cavity: Gingiva/lips within normal limits.  Dentition/gingiva healthy appearing. Mucus membranes moist. Floor of mouth soft, no masses palpated. Oral Tongue mobile. Hard Palate appears normal.    Oropharynx: Base of tongue appears normal. No masses/lesions noted. Tonsillar fossa/pharyngeal wall without lesions. Posterior oropharynx WNL.  Soft palate without masses. Midline uvula.   Neck/Lymphatic: No LAD I-VI bilaterally.  No thyromegaly.  No masses noted on exam.    Mirror laryngoscopy/nasopharyngoscopy: Active gag reflex.  Unable to perform.    Neuro/Psychiatric: AOx3.  Normal mood and affect.   Cardiovascular: Normal carotid pulses bilaterally, no increasing jugular venous distention noted at cervical region bilaterally.    Respiratory: Normal respiratory effort, no stridor, no retractions noted.    Ear Cerumen Removal    Date/Time: 7/24/2024 10:00 AM    Performed by: Nicky Sanchez NP  Authorized by: Nicky Sanchez NP    Consent Done?:  Yes (Verbal)    Local anesthetic:  None  Location details:  Both ears  Procedure type: curette    Cerumen  Removal Results:  Cerumen completely removed  Patient tolerance:  Patient tolerated the procedure well with no immediate complications      Audiogram: Reviewed and interpreted by me, and discussed with the patient today.    Pure tone testing revealed normal hearing through 4000 Hz sloping to a severe hearing loss at  8000 Hz in both ears.  Speech reception thresholds were obtained at 10 dBHL in the right ear and 10 dBHL in the left ear. Speech discrimination scores were 100% in the right ear and 100% in the left ear. Tympanometry revealed Type A tympanograms in both ears.            CT HEAD WITHOUT CONTRAST 6/22/2024     CLINICAL HISTORY:  Dizziness, persistent/recurrent, cardiac or vascular cause suspected;     TECHNIQUE:  Low dose axial images were obtained through the head.  Coronal and sagittal reformations were also performed. Contrast was not administered.     COMPARISON:  11/03/2020 CT brain     FINDINGS:  No acute intracranial intra or extra-axial hemorrhage or hematoma.  Gray-white matter junction differentiation is intact.  Mild patchy deep white matter low density as seen with microvascular chronic ischemic changes.  Imaged paranasal sinuses, mastoid air cells and middle ears are clear.  Bony calvarium is intact.     Impression:     No acute intracranial abnormality.     Microvascular chronic ischemic changes.         Assessment:    ICD-10-CM ICD-9-CM    1. Vertigo  R42 780.4       2. Dizziness  R42 780.4 Ambulatory referral/consult to ENT      3. Bilateral impacted cerumen  H61.23 380.4 Ear Cerumen Removal      4. 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      Plan:  Orders Placed This Encounter   Procedures    Ear Cerumen Removal     Cerumen impaction:  Removed under microscopy today without difficulty.  I would recommend the use of a wax softening drop, either over the counter Debrox or mineral oil, on a weekly basis.  I also instructed the patient to avoid Qtips.    -I suspect that her vertigo is BPPV. We had a long discussion regarding the relevant anatomy and pathology relevant to BPPV.  We discussed that in the ear  there are three semicircular canals that detect rotational movement.  BPPV occurs as a result of otoconia, tiny crystals of calcium carbonate that are a normal part of the inner ears anatomy, detaching from their normal anatomic position and collecting in one of the semicircular canals.  When the head moves, the otoconia shift. This stimulates the cupula to send false signals to the brain, producing vertigo and triggering nystagmus (involuntary eye movements). Her vertigo has resolved. She knows to come back if vertigo returns.   - Reyes-Hallpike was negative bilaterally and otoscopic exam was benign.     -We reviewed the patient's recent audiogram and hearing loss in detail.   We also discussed the use hearing protection when exposed to loud noise, including lawn equipment. Recommend audiogram in 1 year.      Nicky Sanchez NP

## 2024-07-24 NOTE — PROGRESS NOTES
Aimee Dent, a 83 y.o. female was seen today in the clinic for an audiologic evaluation. The patient's primary complaint was dizziness.  She reports experiencing vertigo in the past. Ms. Yu denies tinnitus ear pain and drainage. No concerns with hearing perception were reported.    Pure tone testing revealed normal hearing through 4000 Hz sloping to a severe hearing loss at 8000 Hz in both ears.  Speech reception thresholds were obtained at 10 dBHL in the right ear and 10 dBHL in the left ear. Speech discrimination scores were 100% in the right ear and 100% in the left ear. Tympanometry revealed Type A tympanograms in both ears. Otoscopy revealed excessive cerumen in both ears.    Recommendations:  Otologic evaluation  Annual audiologic evaluation  Hearing protection in noise

## 2025-03-06 ENCOUNTER — OFFICE VISIT (OUTPATIENT)
Dept: OBSTETRICS AND GYNECOLOGY | Facility: CLINIC | Age: 85
End: 2025-03-06
Payer: MEDICARE

## 2025-03-06 VITALS
SYSTOLIC BLOOD PRESSURE: 158 MMHG | BODY MASS INDEX: 20.47 KG/M2 | WEIGHT: 119.25 LBS | DIASTOLIC BLOOD PRESSURE: 72 MMHG

## 2025-03-06 DIAGNOSIS — Z01.419 WELL WOMAN EXAM WITH ROUTINE GYNECOLOGICAL EXAM: Primary | ICD-10-CM

## 2025-03-06 DIAGNOSIS — Z12.31 ENCOUNTER FOR SCREENING MAMMOGRAM FOR BREAST CANCER: ICD-10-CM

## 2025-03-06 PROCEDURE — 99999 PR PBB SHADOW E&M-EST. PATIENT-LVL III: CPT | Mod: PBBFAC,,, | Performed by: OBSTETRICS & GYNECOLOGY

## 2025-03-06 NOTE — PROGRESS NOTES
CC: Annual check-up    SUBJECTIVE:   84 y.o. female   for annual routine Pap and checkup. No LMP recorded (lmp unknown). Patient has had a hysterectomy..  She has no unusual complaints.        Past Medical History:   Diagnosis Date    Atrial fibrillation     Hypertension      Past Surgical History:   Procedure Laterality Date    HYSTERECTOMY      TONSILLECTOMY       Social History[1]  Family History   Problem Relation Name Age of Onset    Heart attack Father      Heart disease Brother      Heart disease Sister       OB History    Para Term  AB Living   2 1 0 1 1 1   SAB IAB Ectopic Multiple Live Births   1    1      # Outcome Date GA Lbr Rigoberto/2nd Weight Sex Type Anes PTL Lv   2 SAB            1      F Vag-Spont  Y IZZY         Current Medications[2]  Allergies: Amoxicillin     ROS:  Constitutional: no weight loss, weight gain, fever, fatigue  Eyes:  No vision changes, glasses/contacts  ENT/Mouth: No ulcers, sinus problems, ears ringing, headache  Cardiovascular: No inability to lie flat, chest pain, exercise intolerance, swelling, heart palpitations  Respiratory: No wheezing, coughing blood, shortness of breath, or cough  Gastrointestinal: No diarrhea, bloody stool, nausea/vomiting, constipation, gas, hemorrhoids  Genitourinary: No blood in urine, painful urination, urgency of urination, frequency of urination, incomplete emptying, incontinence, abnormal bleeding, painful periods, heavy periods, vaginal discharge, vaginal odor, painful intercourse, sexual problems, bleeding after intercourse.  Musculoskeletal: No muscle weakness  Skin/Breast: No painful breasts, nipple discharge, masses, rash, ulcers  Neurological: No passing out, seizures, numbness, headache  Endocrine: No diabetes, hypothyroid, hyperthyroid, hot flashes, hair loss, abnormal hair growth, ance  Psychiatric: No depression, crying  Hematologic: No bruises, bleeding, swollen lymph nodes, anemia.      OBJECTIVE:   The patient  appears well, alert, oriented x 3, in no distress.  BP (!) 158/72   Wt 54.1 kg (119 lb 4.3 oz)   LMP  (LMP Unknown)   BMI 20.47 kg/m²   NECK: no thyromegaly, trachea midline  SKIN: no acne, striae, hirsutism  BREAST EXAM: breasts appear normal, no suspicious masses, no skin or nipple changes or axillary nodes  ABDOMEN: no hernias, masses, or hepatosplenomegaly  GENITALIA: normal external genitalia, no erythema, no discharge  URETHRA: normal urethra, normal urethral meatus  VAGINA: mucosal atrophy  CERVIX: absent  UTERUS: uterus absent  ADNEXA: no mass, fullness, tenderness      ASSESSMENT:   well woman  1. Well woman exam with routine gynecological exam    2. Encounter for screening mammogram for breast cancer        PLAN:   mammogram  return annually or prn  Orders Placed This Encounter    Mammo Digital Screening Bilat w/ Joe (XPD)            [1]   Social History  Socioeconomic History    Marital status:    Tobacco Use    Smoking status: Never    Smokeless tobacco: Never   Substance and Sexual Activity    Alcohol use: No    Drug use: No    Sexual activity: Not Currently     Social Drivers of Health     Financial Resource Strain: Low Risk  (7/31/2023)    Received from [facility]    Overall Financial Resource Strain (CARDIA)     Difficulty of Paying Living Expenses: Not hard at all   Food Insecurity: No Food Insecurity (7/31/2023)    Received from [facility]    Hunger Vital Sign     Worried About Running Out of Food in the Last Year: Never true     Ran Out of Food in the Last Year: Never true   Transportation Needs: No Transportation Needs (7/31/2023)    Received from [facility]    PRAPARE - Transportation     Lack of Transportation (Medical): No     Lack of Transportation (Non-Medical): No   Physical Activity: Unknown (7/31/2023)    Received from [facility]    Exercise Vital Sign     Days of Exercise per Week: 0 days   Stress: No Stress Concern Present (7/31/2023)    Received from [facility]     [place] [facility] [employer] - Occupational Stress Questionnaire     Feeling of Stress : Not at all   Housing Stability: Unknown (7/31/2023)    Received from [facility]    Housing Stability Vital Sign     Unable to Pay for Housing in the Last Year: No     Unstable Housing in the Last Year: No   [2]   Current Outpatient Medications   Medication Sig Dispense Refill    aspirin (ECOTRIN) 81 MG EC tablet Take 81 mg by mouth once daily.      atorvastatin (LIPITOR) 10 MG tablet Take 10 mg by mouth once daily.      calcium-vitamin D3 500 mg(1,250mg) -200 unit per tablet Take 1 tablet by mouth 2 (two) times daily with meals.      fish oil-omega-3 fatty acids 300-1,000 mg capsule Take 2 g by mouth once daily.      LIDOcaine (LIDODERM) 5 % Place 1 patch onto the skin once daily. Remove & Discard patch within 12 hours or as directed by MD 5 patch 0    meclizine (ANTIVERT) 25 mg tablet Take 1 tablet (25 mg total) by mouth 3 (three) times daily as needed. 20 tablet 0    meclizine (ANTIVERT) 25 mg tablet Take 1 tablet (25 mg total) by mouth 3 (three) times daily as needed for Dizziness. 15 tablet 0    metoprolol succinate (TOPROL-XL) 100 MG 24 hr tablet Take 100 mg by mouth once daily.      multivitamin with minerals tablet Take 1 tablet by mouth once daily.      potassium chloride SA (K-DUR,KLOR-CON) 20 MEQ tablet Take 20 mEq by mouth once daily.       triamterene-hydrochlorothiazide 37.5-25 mg (MAXZIDE-25) 37.5-25 mg per tablet Take 1 tablet by mouth once daily.       No current facility-administered medications for this visit.

## 2025-04-10 ENCOUNTER — HOSPITAL ENCOUNTER (OUTPATIENT)
Dept: RADIOLOGY | Facility: HOSPITAL | Age: 85
Discharge: HOME | End: 2025-04-10
Attending: OBSTETRICS & GYNECOLOGY
Payer: MEDICARE

## 2025-04-10 DIAGNOSIS — Z12.31 ENCOUNTER FOR SCREENING MAMMOGRAM FOR BREAST CANCER: ICD-10-CM

## 2025-04-10 PROCEDURE — 77063 BREAST TOMOSYNTHESIS BI: CPT | Mod: 26,,, | Performed by: RADIOLOGY

## 2025-04-10 PROCEDURE — 77067 SCR MAMMO BI INCL CAD: CPT | Mod: 26,,, | Performed by: RADIOLOGY

## 2025-04-10 PROCEDURE — 77063 BREAST TOMOSYNTHESIS BI: CPT | Mod: TC,PN

## 2025-04-23 ENCOUNTER — HOSPITAL ENCOUNTER (EMERGENCY)
Facility: HOSPITAL | Age: 85
Discharge: HOME | End: 2025-04-23
Attending: EMERGENCY MEDICINE
Payer: MEDICARE

## 2025-04-23 VITALS
HEIGHT: 64 IN | HEART RATE: 88 BPM | TEMPERATURE: 98 F | SYSTOLIC BLOOD PRESSURE: 165 MMHG | RESPIRATION RATE: 17 BRPM | DIASTOLIC BLOOD PRESSURE: 85 MMHG | OXYGEN SATURATION: 95 % | WEIGHT: 118 LBS | BODY MASS INDEX: 20.14 KG/M2

## 2025-04-23 DIAGNOSIS — R53.83 FATIGUE: Primary | ICD-10-CM

## 2025-04-23 DIAGNOSIS — R11.2 NAUSEA AND VOMITING, UNSPECIFIED VOMITING TYPE: ICD-10-CM

## 2025-04-23 LAB
ABSOLUTE EOSINOPHIL (OHS): 0.01 K/UL
ABSOLUTE MONOCYTE (OHS): 0.21 K/UL (ref 0.3–1)
ABSOLUTE NEUTROPHIL COUNT (OHS): 4.95 K/UL (ref 1.8–7.7)
ALBUMIN SERPL BCP-MCNC: 3.8 G/DL (ref 3.5–5.2)
ALP SERPL-CCNC: 53 UNIT/L (ref 40–150)
ALT SERPL W/O P-5'-P-CCNC: 12 UNIT/L (ref 10–44)
ANION GAP (OHS): 9 MMOL/L (ref 8–16)
AST SERPL-CCNC: 20 UNIT/L (ref 11–45)
BASOPHILS # BLD AUTO: 0.01 K/UL
BASOPHILS NFR BLD AUTO: 0.2 %
BILIRUB SERPL-MCNC: 0.8 MG/DL (ref 0.1–1)
BILIRUB UR QL STRIP.AUTO: NEGATIVE
BUN SERPL-MCNC: 19 MG/DL (ref 8–23)
CALCIUM SERPL-MCNC: 9.2 MG/DL (ref 8.7–10.5)
CHLORIDE SERPL-SCNC: 104 MMOL/L (ref 95–110)
CK SERPL-CCNC: 146 U/L (ref 20–180)
CLARITY UR: ABNORMAL
CO2 SERPL-SCNC: 26 MMOL/L (ref 23–29)
COLOR UR AUTO: YELLOW
CREAT SERPL-MCNC: 0.6 MG/DL (ref 0.5–1.4)
CTP QC/QA: YES
CTP QC/QA: YES
ERYTHROCYTE [DISTWIDTH] IN BLOOD BY AUTOMATED COUNT: 12.4 % (ref 11.5–14.5)
GFR SERPLBLD CREATININE-BSD FMLA CKD-EPI: >60 ML/MIN/1.73/M2
GLUCOSE SERPL-MCNC: 119 MG/DL (ref 70–110)
GLUCOSE UR QL STRIP: ABNORMAL
HCT VFR BLD AUTO: 41.5 % (ref 37–48.5)
HGB BLD-MCNC: 13.9 GM/DL (ref 12–16)
HGB UR QL STRIP: NEGATIVE
HOLD SPECIMEN: NORMAL
IMM GRANULOCYTES # BLD AUTO: 0.01 K/UL (ref 0–0.04)
IMM GRANULOCYTES NFR BLD AUTO: 0.2 % (ref 0–0.5)
KETONES UR QL STRIP: ABNORMAL
LEUKOCYTE ESTERASE UR QL STRIP: NEGATIVE
LIPASE SERPL-CCNC: 21 U/L (ref 4–60)
LYMPHOCYTES # BLD AUTO: 0.27 K/UL (ref 1–4.8)
MAGNESIUM SERPL-MCNC: 1.8 MG/DL (ref 1.6–2.6)
MCH RBC QN AUTO: 30.8 PG (ref 27–31)
MCHC RBC AUTO-ENTMCNC: 33.5 G/DL (ref 32–36)
MCV RBC AUTO: 92 FL (ref 82–98)
NITRITE UR QL STRIP: NEGATIVE
NUCLEATED RBC (/100WBC) (OHS): 0 /100 WBC
PH UR STRIP: 7 [PH]
PLATELET # BLD AUTO: 269 K/UL (ref 150–450)
PMV BLD AUTO: 9.6 FL (ref 9.2–12.9)
POC MOLECULAR INFLUENZA A AGN: NEGATIVE
POC MOLECULAR INFLUENZA B AGN: NEGATIVE
POTASSIUM SERPL-SCNC: 3.4 MMOL/L (ref 3.5–5.1)
PROT SERPL-MCNC: 6.6 GM/DL (ref 6–8.4)
PROT UR QL STRIP: NEGATIVE
RBC # BLD AUTO: 4.51 M/UL (ref 4–5.4)
RELATIVE EOSINOPHIL (OHS): 0.2 %
RELATIVE LYMPHOCYTE (OHS): 4.9 % (ref 18–48)
RELATIVE MONOCYTE (OHS): 3.8 % (ref 4–15)
RELATIVE NEUTROPHIL (OHS): 90.7 % (ref 38–73)
SARS-COV-2 RDRP RESP QL NAA+PROBE: NEGATIVE
SODIUM SERPL-SCNC: 139 MMOL/L (ref 136–145)
SP GR UR STRIP: 1.01
TROPONIN I SERPL DL<=0.01 NG/ML-MCNC: 0.01 NG/ML
TSH SERPL-ACNC: 0.61 UIU/ML (ref 0.4–4)
UROBILINOGEN UR STRIP-ACNC: NEGATIVE EU/DL
WBC # BLD AUTO: 5.46 K/UL (ref 3.9–12.7)

## 2025-04-23 PROCEDURE — 83690 ASSAY OF LIPASE: CPT | Performed by: PHYSICIAN ASSISTANT

## 2025-04-23 PROCEDURE — 96361 HYDRATE IV INFUSION ADD-ON: CPT

## 2025-04-23 PROCEDURE — 80053 COMPREHEN METABOLIC PANEL: CPT | Performed by: PHYSICIAN ASSISTANT

## 2025-04-23 PROCEDURE — 84443 ASSAY THYROID STIM HORMONE: CPT | Performed by: PHYSICIAN ASSISTANT

## 2025-04-23 PROCEDURE — 93010 ELECTROCARDIOGRAM REPORT: CPT | Mod: ,,, | Performed by: INTERNAL MEDICINE

## 2025-04-23 PROCEDURE — 84484 ASSAY OF TROPONIN QUANT: CPT | Performed by: PHYSICIAN ASSISTANT

## 2025-04-23 PROCEDURE — 63600175 PHARM REV CODE 636 W HCPCS: Performed by: PHYSICIAN ASSISTANT

## 2025-04-23 PROCEDURE — 87502 INFLUENZA DNA AMP PROBE: CPT

## 2025-04-23 PROCEDURE — 81003 URINALYSIS AUTO W/O SCOPE: CPT | Performed by: EMERGENCY MEDICINE

## 2025-04-23 PROCEDURE — 99284 EMERGENCY DEPT VISIT MOD MDM: CPT | Mod: 25

## 2025-04-23 PROCEDURE — 83735 ASSAY OF MAGNESIUM: CPT | Performed by: PHYSICIAN ASSISTANT

## 2025-04-23 PROCEDURE — 82550 ASSAY OF CK (CPK): CPT | Performed by: EMERGENCY MEDICINE

## 2025-04-23 PROCEDURE — 25000003 PHARM REV CODE 250: Performed by: PHYSICIAN ASSISTANT

## 2025-04-23 PROCEDURE — 93005 ELECTROCARDIOGRAM TRACING: CPT

## 2025-04-23 PROCEDURE — 96374 THER/PROPH/DIAG INJ IV PUSH: CPT

## 2025-04-23 PROCEDURE — 85025 COMPLETE CBC W/AUTO DIFF WBC: CPT | Performed by: PHYSICIAN ASSISTANT

## 2025-04-23 PROCEDURE — 87635 SARS-COV-2 COVID-19 AMP PRB: CPT | Performed by: EMERGENCY MEDICINE

## 2025-04-23 RX ORDER — ONDANSETRON 4 MG/1
4 TABLET, ORALLY DISINTEGRATING ORAL EVERY 6 HOURS PRN
Qty: 20 TABLET | Refills: 0 | Status: SHIPPED | OUTPATIENT
Start: 2025-04-23

## 2025-04-23 RX ORDER — METOPROLOL SUCCINATE 50 MG/1
100 TABLET, EXTENDED RELEASE ORAL ONCE
Status: COMPLETED | OUTPATIENT
Start: 2025-04-23 | End: 2025-04-23

## 2025-04-23 RX ORDER — ONDANSETRON HYDROCHLORIDE 2 MG/ML
4 INJECTION, SOLUTION INTRAVENOUS
Status: COMPLETED | OUTPATIENT
Start: 2025-04-23 | End: 2025-04-23

## 2025-04-23 RX ADMIN — METOPROLOL SUCCINATE 100 MG: 50 TABLET, EXTENDED RELEASE ORAL at 02:04

## 2025-04-23 RX ADMIN — ONDANSETRON 4 MG: 2 INJECTION INTRAMUSCULAR; INTRAVENOUS at 09:04

## 2025-04-23 RX ADMIN — SODIUM CHLORIDE 500 ML: 9 INJECTION, SOLUTION INTRAVENOUS at 09:04

## 2025-04-23 NOTE — ED NOTES
Pt bib EMS from home with c/o of generalized weakness and htn that started yesterday. Pt reports nausea, had 1 emesis episode upon arrival to room. Hx of htn, pt reports compliance with medications, but did not take morning medications. Denies SOB, pain, fever. Pt AAOx4, NAD noted. Pt arrived on RA, EMS placed 20G PIV. Pt placed on cardiac monitoring, pulse ox, and automatic BP. Call light within reach.

## 2025-04-23 NOTE — ED NOTES
Writer spoke with  at this time-  Pending labs all hemolyzed and will need a recollect.  Primary RN aware

## 2025-04-23 NOTE — ED NOTES
Pt c/o intermittent cramping to BLE. Pt endorses cramping is worse on right side. PA notified. Denies other pain and needs at this time. Pending UA, pt unable to provide urine sample at this time. Pt connected to [employer]. Daughter at bedside. Call light within reach.

## 2025-04-23 NOTE — ED PROVIDER NOTES
Encounter Date: 4/23/2025       History     Chief Complaint   Patient presents with    Generalized Weakness     The pt presents to the ED from home with a complaint of generalized weakness and HTN since yesterday.       84-year-old female, PMH HTN, AFib, presents to ED via EMS with concern of generalized fatigue that began yesterday morning.  +nausea and vomiting since arrival to ED. no known sick contacts.  Denying any associated fevers, chills, headache, nasal congestion, sore throat, cough, chest pain, shortness of breath, abdominal pain, urinary or bowel complaints.  Last bowel movement 2 days ago described as normal.  She does mention getting intermittent cramps in lower extremities but denying any lower extremity pain or swelling.  No recent injuries.  She is compliant with her home medicines but states she did not take her medications this morning.  No other acute complaints at this time    The history is provided by the patient.     Review of patient's allergies indicates:   Allergen Reactions    Amoxicillin Rash     Past Medical History:   Diagnosis Date    Atrial fibrillation     Hypertension      Past Surgical History:   Procedure Laterality Date    HYSTERECTOMY      TONSILLECTOMY       Family History   Problem Relation Name Age of Onset    Heart attack Father      Heart disease Brother      Heart disease Sister       Social History[1]  Review of Systems   Constitutional:  Positive for fatigue. Negative for chills and fever.   HENT:  Negative for congestion, ear pain and sore throat.    Respiratory:  Negative for cough and shortness of breath.    Cardiovascular:  Negative for chest pain.   Gastrointestinal:  Positive for nausea and vomiting. Negative for abdominal pain, constipation and diarrhea.   Genitourinary:  Negative for dysuria and flank pain.   Musculoskeletal:  Negative for myalgias, neck pain and neck stiffness.   Neurological:  Negative for headaches.       Physical Exam     Initial Vitals  [04/23/25 0857]   BP Pulse Resp Temp SpO2   (!) 196/126 98 16 97.9 °F (36.6 °C) 97 %      MAP       --         Physical Exam    Vitals reviewed.  Constitutional: She appears well-developed and well-nourished. She is active. She does not have a sickly appearance. She does not appear ill. No distress.   Vomiting on initial evaluation; controlled after Zofran.  Otherwise in no distress   HENT:   Head: Normocephalic and atraumatic.   Neck:   Normal range of motion.  Cardiovascular:  Normal rate and regular rhythm.           Pulmonary/Chest: Effort normal and breath sounds normal.   Musculoskeletal:      Cervical back: Normal range of motion. No rigidity.     Neurological: She is alert. GCS eye subscore is 4. GCS verbal subscore is 5. GCS motor subscore is 6.   Skin: Skin is warm and dry.   Psychiatric: She has a normal mood and affect. Her speech is normal and behavior is normal.         ED Course   Procedures  Labs Reviewed   COMPREHENSIVE METABOLIC PANEL - Abnormal       Result Value    Sodium 139      Potassium 3.4 (*)     Chloride 104      CO2 26      Glucose 119 (*)     BUN 19      Creatinine 0.6      Calcium 9.2      Protein Total 6.6      Albumin 3.8      Bilirubin Total 0.8      ALP 53      AST 20      ALT 12      Anion Gap 9      eGFR >60     CBC WITH DIFFERENTIAL - Abnormal    WBC 5.46      RBC 4.51      HGB 13.9      HCT 41.5      MCV 92      MCH 30.8      MCHC 33.5      RDW 12.4      Platelet Count 269      MPV 9.6      Nucleated RBC 0      Neut % 90.7 (*)     Lymph % 4.9 (*)     Mono % 3.8 (*)     Eos % 0.2      Basophil % 0.2      Imm Grans % 0.2      Neut # 4.95      Lymph # 0.27 (*)     Mono # 0.21 (*)     Eos # 0.01      Baso # 0.01      Imm Grans # 0.01     URINALYSIS, REFLEX TO URINE CULTURE - Abnormal    Color, UA Yellow      Appearance, UA Hazy (*)     pH, UA 7.0      Spec Grav UA 1.015      Protein, UA Negative      Glucose, UA Trace (*)     Ketones, UA 1+ (*)     Bilirubin, UA Negative      Blood,  UA Negative      Nitrites, UA Negative      Urobilinogen, UA Negative      Leukocyte Esterase, UA Negative     LIPASE - Normal    Lipase Level 21     MAGNESIUM - Normal    Magnesium  1.8     TROPONIN I - Normal    Troponin-I 0.015     TSH - Normal    TSH 0.606     CK - Normal         CBC W/ AUTO DIFFERENTIAL    Narrative:     The following orders were created for panel order CBC auto differential.  Procedure                               Abnormality         Status                     ---------                               -----------         ------                     CBC with Differential[[id number]]       Abnormal            Final result                 Please view results for these tests on the individual orders.   [place] URINE HOLD   POCT INFLUENZA A/B MOLECULAR    POC Molecular Influenza A Ag Negative      POC Molecular Influenza B Ag Negative       Acceptable Yes     SARS-COV-2 RDRP GENE    POC Rapid COVID Negative       Acceptable Yes          ECG Results              EKG 12-lead (In process)        Collection Time Result Time QRS Duration [facility] QTC Calculation    04/23/25 09:09:46 04/23/25 10:44:40 138 510                     In process by Interface, Lab In [facility] (04/23/25 10:44:48)                   Narrative:    Test Reason : R53.83,    Vent. Rate :  94 BPM     Atrial Rate :  94 BPM     P-R Int : 184 ms          QRS Dur : 138 ms      QT Int : 408 ms       P-R-T Axes :  64  15   6 degrees    QTcB Int : 510 ms    Normal sinus rhythm  Possible Left atrial enlargement  Right bundle branch block  T wave abnormality, consider inferior ischemia  Abnormal ECG  When compared with ECG of 22-Jun-2024 18:36,  Premature ventricular complexes are no longer Present    Referred By: AAAREFERRAL SELF           Confirmed By:                                   Imaging Results    None          Medications   metoprolol succinate (TOPROL-XL) 24 hr tablet 100 mg (has no administration in  time range)   ondansetron injection 4 mg (4 mg Intravenous Given 4/23/25 0918)   sodium chloride 0.9% bolus 500 mL 500 mL (0 mLs Intravenous Stopped 4/23/25 1100)     Medical Decision Making  Patient presents via EMS from home with concern of generalized fatigue that began yesterday.  +nausea and vomiting since arrival to ED.  No pain symptoms.  Afebrile.  Hypertensive on arrival 196/126 but with remaining vitals WNL.  In no distress    DDx:  Including but not limited to generalized fatigue, VIRI, dehydration, electrolyte abnormality, ACS, STEMI, NSTEMI, palpitations, arrhythmia, viral, URI, allergy/irritant, UTI, sepsis, thyroid complication    Amount and/or Complexity of Data Reviewed  Labs: ordered. Decision-making details documented in ED Course.    Risk  Prescription drug management.               ED Course as of 04/23/25 1419   Wed Apr 23, 2025   1001 CBC auto differential(!)  Grossly unremarkable.  Stable H/H.  No elevation WBC [KS]   1018 POCT Influenza A/B Molecular  Negative [KS]   1018 POCT COVID-19 Rapid Screening  Negative [KS]   1127 Comprehensive metabolic panel(!)  Mild hypokalemia 3.4.  Creatinine WNL. [KS]   1127 Lipase  WNL [KS]   1128 Magnesium  WNL [KS]   1128 Troponin I  WNL [KS]   1128 TSH  WNL [KS]   1341 Urinalysis, Reflex to Urine Culture(!)  Grossly unremarkable.  No significant evidence of urinary infection [KS]   1407 CPK  WNL [KS]   1407 Patient continues to rest comfortably on re-evaluation.  Results were discussed at length with both patient and daughter.  There is a suspicion patient's presenting symptoms may be viral.  BP continues to improve throughout ED stay.  She was given her home metoprolol prior to discharge.  Prescription written for Zofran.  Encouraged to continue advancing diet as tolerated, oral hydration, very close monitoring with close outpatient follow-up.  ED return precautions were discussed at length with daughter.  She states her understanding and agrees with plan  [KS]   1411 Patient discussed with attending, Dr. Lopez, who agrees with ED course and dispo. [KS]      ED Course User Index  [KS] Hari Arriaza PA-C                           Clinical Impression:  Final diagnoses:  [R53.83] Fatigue (Primary)  [R11.2] Nausea and vomiting, unspecified vomiting type          ED Disposition Condition    Discharge Stable          ED Prescriptions       Medication Sig Dispense Start Date End Date Auth. Provider    ondansetron (ZOFRAN-ODT) 4 MG TbDL Take 1 tablet (4 mg total) by mouth every 6 (six) hours as needed (nausea). 20 tablet 4/23/2025 -- Hari Arriaza PA-C          Follow-up Information       Follow up With Specialties Details Why Contact Info    Your Doctor                   [1]   Social History  Tobacco Use    Smoking status: Never    Smokeless tobacco: Never   Substance Use Topics    Alcohol use: No    Drug use: No        Hari Arriaza PA-C  04/23/25 3072

## 2025-04-23 NOTE — ED NOTES
Pt resting in bed, denies pain and needs at this time. Pt unable to provide urine sample at this time, pt placed on purewick. Daughter at bedside. Call light within reach.

## 2025-04-26 ENCOUNTER — NURSE TRIAGE (OUTPATIENT)
Dept: ADMINISTRATIVE | Facility: CLINIC | Age: 85
End: 2025-04-26
Payer: MEDICARE

## 2025-04-26 ENCOUNTER — HOSPITAL ENCOUNTER (INPATIENT)
Facility: HOSPITAL | Age: 85
LOS: 3 days | Discharge: NURSING, REHAB OR LONG-TERM CARE | DRG: 065 | End: 2025-04-30
Attending: EMERGENCY MEDICINE | Admitting: HOSPITALIST
Payer: MEDICARE

## 2025-04-26 DIAGNOSIS — R29.818 ACUTE FOCAL NEUROLOGICAL DEFICIT: ICD-10-CM

## 2025-04-26 DIAGNOSIS — I48.91 ATRIAL FIBRILLATION, UNSPECIFIED TYPE: Primary | ICD-10-CM

## 2025-04-26 DIAGNOSIS — I63.9 CVA (CEREBRAL VASCULAR ACCIDENT): ICD-10-CM

## 2025-04-26 DIAGNOSIS — I63.9 STROKE: ICD-10-CM

## 2025-04-26 DIAGNOSIS — E87.6 HYPOKALEMIA: ICD-10-CM

## 2025-04-26 PROBLEM — I10 PRIMARY HYPERTENSION: Status: ACTIVE | Noted: 2025-04-26

## 2025-04-26 PROBLEM — I45.10 RBBB: Status: ACTIVE | Noted: 2025-04-26

## 2025-04-26 LAB
ABSOLUTE EOSINOPHIL (OHS): 0.03 K/UL
ABSOLUTE MONOCYTE (OHS): 0.86 K/UL (ref 0.3–1)
ABSOLUTE NEUTROPHIL COUNT (OHS): 5.95 K/UL (ref 1.8–7.7)
ALBUMIN SERPL BCP-MCNC: 3.6 G/DL (ref 3.5–5.2)
ALP SERPL-CCNC: 52 UNIT/L (ref 40–150)
ALT SERPL W/O P-5'-P-CCNC: 13 UNIT/L (ref 10–44)
ANION GAP (OHS): 8 MMOL/L (ref 8–16)
AST SERPL-CCNC: 19 UNIT/L (ref 11–45)
BACTERIA #/AREA URNS AUTO: ABNORMAL /HPF
BASOPHILS # BLD AUTO: 0.02 K/UL
BASOPHILS NFR BLD AUTO: 0.3 %
BILIRUB SERPL-MCNC: 0.9 MG/DL (ref 0.1–1)
BILIRUB UR QL STRIP.AUTO: NEGATIVE
BUN SERPL-MCNC: 14 MG/DL (ref 8–23)
CALCIUM SERPL-MCNC: 8.9 MG/DL (ref 8.7–10.5)
CHLORIDE SERPL-SCNC: 106 MMOL/L (ref 95–110)
CHOLEST SERPL-MCNC: 137 MG/DL (ref 120–199)
CHOLEST/HDLC SERPL: 3.9 {RATIO} (ref 2–5)
CLARITY UR: CLEAR
CO2 SERPL-SCNC: 28 MMOL/L (ref 23–29)
COLOR UR AUTO: COLORLESS
CREAT SERPL-MCNC: 0.6 MG/DL (ref 0.5–1.4)
CTP QC/QA: YES
EAG (OHS): 120 MG/DL (ref 68–131)
ERYTHROCYTE [DISTWIDTH] IN BLOOD BY AUTOMATED COUNT: 12.1 % (ref 11.5–14.5)
GFR SERPLBLD CREATININE-BSD FMLA CKD-EPI: >60 ML/MIN/1.73/M2
GLUCOSE SERPL-MCNC: 115 MG/DL (ref 70–110)
GLUCOSE UR QL STRIP: NEGATIVE
HBA1C MFR BLD: 5.8 % (ref 4–5.6)
HCT VFR BLD AUTO: 40.5 % (ref 37–48.5)
HDLC SERPL-MCNC: 35 MG/DL (ref 40–75)
HDLC SERPL: 25.5 % (ref 20–50)
HGB BLD-MCNC: 13.5 GM/DL (ref 12–16)
HGB UR QL STRIP: NEGATIVE
HOLD SPECIMEN: NORMAL
IMM GRANULOCYTES # BLD AUTO: 0.02 K/UL (ref 0–0.04)
IMM GRANULOCYTES NFR BLD AUTO: 0.3 % (ref 0–0.5)
INR PPP: 1 (ref 0.8–1.2)
KETONES UR QL STRIP: ABNORMAL
LDLC SERPL CALC-MCNC: 90.8 MG/DL (ref 63–159)
LEUKOCYTE ESTERASE UR QL STRIP: ABNORMAL
LYMPHOCYTES # BLD AUTO: 0.5 K/UL (ref 1–4.8)
MAGNESIUM SERPL-MCNC: 2 MG/DL (ref 1.6–2.6)
MCH RBC QN AUTO: 30.5 PG (ref 27–31)
MCHC RBC AUTO-ENTMCNC: 33.3 G/DL (ref 32–36)
MCV RBC AUTO: 91 FL (ref 82–98)
MICROSCOPIC COMMENT: ABNORMAL
NITRITE UR QL STRIP: NEGATIVE
NON-SQ EPI CELLS #/AREA URNS AUTO: <1 /HPF
NONHDLC SERPL-MCNC: 102 MG/DL
NUCLEATED RBC (/100WBC) (OHS): 0 /100 WBC
PH UR STRIP: 7 [PH]
PLATELET # BLD AUTO: 299 K/UL (ref 150–450)
PMV BLD AUTO: 9 FL (ref 9.2–12.9)
POTASSIUM SERPL-SCNC: 3 MMOL/L (ref 3.5–5.1)
PROT SERPL-MCNC: 6.4 GM/DL (ref 6–8.4)
PROT UR QL STRIP: NEGATIVE
PROTHROMBIN TIME: 11.4 SECONDS (ref 9–12.5)
RBC # BLD AUTO: 4.43 M/UL (ref 4–5.4)
RBC #/AREA URNS AUTO: <1 /HPF (ref 0–4)
RELATIVE EOSINOPHIL (OHS): 0.4 %
RELATIVE LYMPHOCYTE (OHS): 6.8 % (ref 18–48)
RELATIVE MONOCYTE (OHS): 11.7 % (ref 4–15)
RELATIVE NEUTROPHIL (OHS): 80.5 % (ref 38–73)
SARS-COV-2 RDRP RESP QL NAA+PROBE: NEGATIVE
SODIUM SERPL-SCNC: 142 MMOL/L (ref 136–145)
SP GR UR STRIP: 1
SQUAMOUS #/AREA URNS AUTO: <1 /HPF
TRIGL SERPL-MCNC: 56 MG/DL (ref 30–150)
TSH SERPL-ACNC: 0.9 UIU/ML (ref 0.4–4)
UROBILINOGEN UR STRIP-ACNC: NEGATIVE EU/DL
WBC # BLD AUTO: 7.38 K/UL (ref 3.9–12.7)
WBC #/AREA URNS AUTO: 7 /HPF (ref 0–5)

## 2025-04-26 PROCEDURE — 83036 HEMOGLOBIN GLYCOSYLATED A1C: CPT | Performed by: NURSE PRACTITIONER

## 2025-04-26 PROCEDURE — 96374 THER/PROPH/DIAG INJ IV PUSH: CPT

## 2025-04-26 PROCEDURE — 25000003 PHARM REV CODE 250: Performed by: NURSE PRACTITIONER

## 2025-04-26 PROCEDURE — 85025 COMPLETE CBC W/AUTO DIFF WBC: CPT

## 2025-04-26 PROCEDURE — 93005 ELECTROCARDIOGRAM TRACING: CPT

## 2025-04-26 PROCEDURE — 85610 PROTHROMBIN TIME: CPT

## 2025-04-26 PROCEDURE — 84443 ASSAY THYROID STIM HORMONE: CPT

## 2025-04-26 PROCEDURE — 87635 SARS-COV-2 COVID-19 AMP PRB: CPT

## 2025-04-26 PROCEDURE — 96361 HYDRATE IV INFUSION ADD-ON: CPT

## 2025-04-26 PROCEDURE — 25000003 PHARM REV CODE 250: Performed by: STUDENT IN AN ORGANIZED HEALTH CARE EDUCATION/TRAINING PROGRAM

## 2025-04-26 PROCEDURE — 80061 LIPID PANEL: CPT

## 2025-04-26 PROCEDURE — G0378 HOSPITAL OBSERVATION PER HR: HCPCS

## 2025-04-26 PROCEDURE — 99285 EMERGENCY DEPT VISIT HI MDM: CPT | Mod: 25

## 2025-04-26 PROCEDURE — 94760 N-INVAS EAR/PLS OXIMETRY 1: CPT

## 2025-04-26 PROCEDURE — 81001 URINALYSIS AUTO W/SCOPE: CPT | Performed by: NURSE PRACTITIONER

## 2025-04-26 PROCEDURE — 63600175 PHARM REV CODE 636 W HCPCS: Performed by: NURSE PRACTITIONER

## 2025-04-26 PROCEDURE — 80053 COMPREHEN METABOLIC PANEL: CPT

## 2025-04-26 PROCEDURE — 83735 ASSAY OF MAGNESIUM: CPT | Performed by: STUDENT IN AN ORGANIZED HEALTH CARE EDUCATION/TRAINING PROGRAM

## 2025-04-26 PROCEDURE — 93010 ELECTROCARDIOGRAM REPORT: CPT | Mod: ,,, | Performed by: INTERNAL MEDICINE

## 2025-04-26 PROCEDURE — 93010 ELECTROCARDIOGRAM REPORT: CPT | Mod: 76,,, | Performed by: INTERNAL MEDICINE

## 2025-04-26 RX ORDER — ATORVASTATIN CALCIUM 40 MG/1
80 TABLET, FILM COATED ORAL DAILY
Status: DISCONTINUED | OUTPATIENT
Start: 2025-04-27 | End: 2025-04-30 | Stop reason: HOSPADM

## 2025-04-26 RX ORDER — METOPROLOL TARTRATE 50 MG/1
50 TABLET ORAL EVERY 8 HOURS
Status: COMPLETED | OUTPATIENT
Start: 2025-04-26 | End: 2025-04-27

## 2025-04-26 RX ORDER — DILTIAZEM HYDROCHLORIDE 5 MG/ML
15 INJECTION INTRAVENOUS
Status: COMPLETED | OUTPATIENT
Start: 2025-04-26 | End: 2025-04-26

## 2025-04-26 RX ORDER — SODIUM CHLORIDE 0.9 % (FLUSH) 0.9 %
10 SYRINGE (ML) INJECTION
Status: DISCONTINUED | OUTPATIENT
Start: 2025-04-26 | End: 2025-04-30 | Stop reason: HOSPADM

## 2025-04-26 RX ORDER — POTASSIUM CHLORIDE 20 MEQ/1
40 TABLET, EXTENDED RELEASE ORAL ONCE
Status: COMPLETED | OUTPATIENT
Start: 2025-04-26 | End: 2025-04-26

## 2025-04-26 RX ORDER — DEXTROSE, SODIUM CHLORIDE, SODIUM LACTATE, POTASSIUM CHLORIDE, AND CALCIUM CHLORIDE 5; .6; .31; .03; .02 G/100ML; G/100ML; G/100ML; G/100ML; G/100ML
INJECTION, SOLUTION INTRAVENOUS CONTINUOUS
Status: DISCONTINUED | OUTPATIENT
Start: 2025-04-27 | End: 2025-04-27

## 2025-04-26 RX ORDER — ASPIRIN 81 MG/1
81 TABLET ORAL DAILY
Status: DISCONTINUED | OUTPATIENT
Start: 2025-04-27 | End: 2025-04-28

## 2025-04-26 RX ORDER — BISACODYL 10 MG/1
10 SUPPOSITORY RECTAL DAILY PRN
Status: DISCONTINUED | OUTPATIENT
Start: 2025-04-26 | End: 2025-04-30 | Stop reason: HOSPADM

## 2025-04-26 RX ORDER — ASPIRIN 325 MG
325 TABLET ORAL
Status: COMPLETED | OUTPATIENT
Start: 2025-04-26 | End: 2025-04-26

## 2025-04-26 RX ORDER — CLOPIDOGREL BISULFATE 75 MG/1
300 TABLET ORAL
Status: DISCONTINUED | OUTPATIENT
Start: 2025-04-26 | End: 2025-04-26

## 2025-04-26 RX ORDER — POTASSIUM CHLORIDE 7.45 MG/ML
10 INJECTION INTRAVENOUS ONCE
Status: COMPLETED | OUTPATIENT
Start: 2025-04-26 | End: 2025-04-26

## 2025-04-26 RX ADMIN — POTASSIUM CHLORIDE 40 MEQ: 1500 TABLET, EXTENDED RELEASE ORAL at 11:04

## 2025-04-26 RX ADMIN — POTASSIUM CHLORIDE 10 MEQ: 7.46 INJECTION, SOLUTION INTRAVENOUS at 09:04

## 2025-04-26 RX ADMIN — METOPROLOL TARTRATE 50 MG: 50 TABLET, FILM COATED ORAL at 10:04

## 2025-04-26 RX ADMIN — SODIUM CHLORIDE, SODIUM LACTATE, POTASSIUM CHLORIDE, CALCIUM CHLORIDE AND DEXTROSE MONOHYDRATE: 5; 600; 310; 30; 20 INJECTION, SOLUTION INTRAVENOUS at 11:04

## 2025-04-26 RX ADMIN — ASPIRIN 325 MG ORAL TABLET 325 MG: 325 PILL ORAL at 06:04

## 2025-04-26 RX ADMIN — DILTIAZEM HYDROCHLORIDE 15 MG: 5 INJECTION, SOLUTION INTRAVENOUS at 06:04

## 2025-04-26 NOTE — ED PROVIDER NOTES
Encounter Date: 4/26/2025       History     Chief Complaint   Patient presents with    Extremity Weakness     BIB [facility] for right sided weakness first noticed ~Thursday morning when she woke up. EMS reports right sided facial droop. LKW Wednesday night.  per EMS.  VAN positive upon initial assessment, outside of activation window.     Patient is a 84-year-old female with a PMH of atrial fibrillation and HTN presenting to the ED via EMS with c/o right-sided weakness and facial droop. Her last known well time was Wednesday night (4/23/25). She was seen in ED on 4/23 for nausea and vomiting, discharged with suspected gastroenteritis, and went to bed with no deficits. Per her family, she woke up on Thursday morning with limited use and weakness to the right side of her body. EMS noted a right-sided facial droop. She denies chest pain, SOB, abdominal pain, nausea, vomiting, vision changes or fever.     The history is provided by the EMS personnel and the patient. No  was used.     Review of patient's allergies indicates:   Allergen Reactions    Amoxicillin Rash     Past Medical History:   Diagnosis Date    Atrial fibrillation     Hypertension      Past Surgical History:   Procedure Laterality Date    HYSTERECTOMY      TONSILLECTOMY       Family History   Problem Relation Name Age of Onset    Heart attack Father      Heart disease Brother      Heart disease Sister       Social History[1]  Review of Systems   Constitutional:  Negative for chills and fever.   HENT:  Negative for congestion.    Eyes:  Negative for visual disturbance.   Respiratory:  Negative for shortness of breath.    Cardiovascular:  Negative for chest pain.   Gastrointestinal:  Negative for abdominal pain.   Musculoskeletal:  Negative for neck pain and neck stiffness.   Neurological:  Positive for facial asymmetry and weakness. Negative for speech difficulty and headaches.   All other systems reviewed and are  negative.      Physical Exam     Initial Vitals [04/26/25 1704]   BP Pulse Resp Temp SpO2   (!) 193/126 (!) 116 18 98.4 °F (36.9 °C) 97 %      MAP       --         Physical Exam    Vitals reviewed.  Constitutional: She appears well-developed and well-nourished. She is not diaphoretic. No distress.   HENT:   Head: Normocephalic and atraumatic.   Eyes: Conjunctivae and EOM are normal.   Neck: Neck supple.   Normal range of motion.  Cardiovascular:  An irregularly irregular rhythm present.   Tachycardia present.         Patient in Afib with RVR based on ECG with rate of 161; treating in ED with Cardizem; aspirin given   Pulmonary/Chest: Effort normal and breath sounds normal. No respiratory distress.   Musculoskeletal:      Cervical back: Normal range of motion and neck supple.     Neurological: She is oriented to person, place, and time. A cranial nerve deficit is present.   Right sided upper and lower limb weakness. Right sided facial droop. Right sided drift. No dysphagia. Last known well 3 days ago.   Skin: Skin is warm. Capillary refill takes less than 2 seconds.         ED Course   Critical Care    Date/Time: 4/26/2025 9:31 PM    Performed by: Maureen Silverman PA-C  Authorized by: Irineo Salazar MD  Direct patient critical care time: 60 minutes  Total critical care time (exclusive of procedural time) : 60 minutes  Critical care time was exclusive of separately billable procedures and treating other patients.  Critical care was necessary to treat or prevent imminent or life-threatening deterioration of the following conditions: cardiac failure.  Critical care was time spent personally by me on the following activities: blood draw for specimens, development of treatment plan with patient or surrogate, discussions with consultants, interpretation of cardiac output measurements, evaluation of patient's response to treatment, examination of patient, obtaining history from patient or surrogate, ordering and  performing treatments and interventions, ordering and review of laboratory studies, ordering and review of radiographic studies, pulse oximetry, re-evaluation of patient's condition and review of old charts.        Labs Reviewed   COMPREHENSIVE METABOLIC PANEL - Abnormal       Result Value    Sodium 142      Potassium 3.0 (*)     Chloride 106      CO2 28      Glucose 115 (*)     BUN 14      Creatinine 0.6      Calcium 8.9      Protein Total 6.4      Albumin 3.6      Bilirubin Total 0.9      ALP 52      AST 19      ALT 13      Anion Gap 8      eGFR >60     LIPID PANEL - Abnormal    Cholesterol Total 137      Triglyceride 56      HDL Cholesterol 35 (*)     LDL Cholesterol 90.8      HDL/Cholesterol Ratio 25.5      Cholesterol/HDL Ratio 3.9      Non HDL Cholesterol 102     CBC WITH DIFFERENTIAL - Abnormal    WBC 7.38      RBC 4.43      HGB 13.5      HCT 40.5      MCV 91      MCH 30.5      MCHC 33.3      RDW 12.1      Platelet Count 299      MPV 9.0 (*)     Nucleated RBC 0      Neut % 80.5 (*)     Lymph % 6.8 (*)     Mono % 11.7      Eos % 0.4      Basophil % 0.3      Imm Grans % 0.3      Neut # 5.95      Lymph # 0.50 (*)     Mono # 0.86      Eos # 0.03      Baso # 0.02      Imm Grans # 0.02     URINALYSIS, REFLEX TO URINE CULTURE - Abnormal    Color, UA Colorless (*)     Appearance, UA Clear      pH, UA 7.0      Spec Grav UA 1.005      Protein, UA Negative      Glucose, UA Negative      Ketones, UA Trace (*)     Bilirubin, UA Negative      Blood, UA Negative      Nitrites, UA Negative      Urobilinogen, UA Negative      Leukocyte Esterase, UA Trace (*)    URINALYSIS MICROSCOPIC - Abnormal    RBC, UA <1      WBC, UA 7 (*)     Bacteria, UA Rare      Squamous Epithelial Cells, UA <1      Non-Squamous Epithelial Cells <1 (*)     Microscopic Comment       PROTIME-INR - Normal    PT 11.4      INR 1.0     TSH - Normal    TSH 0.898     MAGNESIUM - Normal    Magnesium  2.0     CBC W/ AUTO DIFFERENTIAL    Narrative:     The  following orders were created for panel order CBC W/ AUTO DIFFERENTIAL.  Procedure                               Abnormality         Status                     ---------                               -----------         ------                     CBC with Differential[[id number]]       Abnormal            Final result                 Please view results for these tests on the individual orders.   GREY TOP URINE HOLD    Extra Tube Hold for add-ons.     HEMOGLOBIN A1C   SARS-COV-2 RDRP GENE    POC Rapid COVID Negative       Acceptable Yes            Imaging Results              X-Ray Chest AP Portable (In process)    Procedure changed from X-Ray Chest PA And Lateral                    MRA Neck without contrast (Final result)  Result time 04/26/25 21:22:13      Final result by Elgin Farnsworth MD (04/26/25 21:22:13)                   Impression:      No acute intracranial abnormality.    No significant arterial abnormalities.      Electronically signed by: Elgin Farnsworth  Date:    04/26/2025  Time:    21:22               Narrative:    EXAMINATION:  MRA NECK WITHOUT CONTRAST    CLINICAL HISTORY:  Neuro deficit, acute, stroke suspected;    TECHNIQUE:  Routine MRI brain without contrast, noncontrast MRA of the brain, and noncontrast MRA of the neck. Multiplanar multisequence MR imaging of the brain was performed without contrast. By separate acquisition, noncontrast MR angiography was performed of the intracranial vasculature with 3D time-of-flight technique through the [place] of [place], with MIP reformatting. Non-contrast 2D and/or 3D time-of-flight MR angiography of the neck was performed, with MIP reformatting.    COMPARISON:  None    FINDINGS:  Intracranial Compartment:    Ventricles and sulci are normal in size for age without evidence of hydrocephalus. No extra-axial blood or fluid collections.    The brain parenchyma appears normal. No mass lesion, acute hemorrhage, edema, or acute  infarct.    Aorta: Normal 3 vessel arch.    Extracranial carotid circulation: No hemodynamically significant stenosis, aneurysmal dilatation, or dissection.    Extracranial vertebral circulation: No hemodynamically significant stenosis, aneurysmal dilatation, or dissection.    Intracranial Arteries: No focal high-grade stenosis, occlusion, or aneurysm.    Skull/Extracranial Contents (limited evaluation): Bone marrow signal intensity is normal.                                       MRA Brain without contrast (Final result)  Result time 04/26/25 21:17:27      Final result by Elgin Farnsworth MD (04/26/25 21:17:27)                   Impression:      No significant intracranial or cervical arterial abnormalities by MRA.      Electronically signed by: Elgin Farnsworth  Date:    04/26/2025  Time:    21:17               Narrative:    EXAMINATION:  MRA BRAIN WITHOUT CONTRAST    CLINICAL HISTORY:  Neuro deficit, acute, stroke suspected;    TECHNIQUE:  By separate acquisition, noncontrast MR angiography was performed of the intracranial vasculature with 3D time-of-flight technique through the [place] of [place], with MIP reformatting. Non-contrast 2D and/or 3D time-of-flight MR angiography of the neck was performed, with MIP reformatting.    COMPARISON:  CT and MRI of the brain same day    FINDINGS:  Extracranial carotid circulation: No hemodynamically significant stenosis, aneurysmal dilatation, or dissection.    Extracranial vertebral circulation: No hemodynamically significant stenosis, aneurysmal dilatation, or dissection.    Intracranial Arteries: No focal high-grade stenosis, occlusion, or aneurysm.    Skull/Extracranial Contents (limited evaluation): Bone marrow signal intensity is normal.                                        MRI Brain Without Contrast (Final result)  Result time 04/26/25 21:01:06      Final result by Elgin Farnsworth MD (04/26/25 21:01:06)                   Impression:      Restricted diffusion  in the region of low density seen on earlier CT compatible with central left infarct without evidence of hemorrhage.  Other causes of abnormal brain signal and restricted diffusion in this region are considered less likely.    Neurology follow-up as clinically warranted.    Chronic small vessel type changes and involutional changes elsewhere appropriate for age.    This report was flagged in Epic as abnormal.      Electronically signed by: Elgin Farnsworth  Date:    04/26/2025  Time:    21:01               Narrative:    EXAMINATION:  MRI BRAIN WITHOUT CONTRAST    CLINICAL HISTORY:  Stroke, follow up;Acute focal neurological deficit;    TECHNIQUE:  Multiplanar multisequence MR imaging of the brain was performed without contrast.    COMPARISON:  CT brain, 04/26/2025, 06/22/2024    FINDINGS:  There is abnormal restricted diffusion involving the head of the caudate nucleus, anterior limb of the internal capsule, putamen and external capsule and corona radiata on the left.  No other restricted diffusion is evident.    Scattered punctate central and subcortical chronic small vessel gliotic signal changes on T2 and FLAIR sequences are noted.    There is no evidence of hemorrhage or blood products.                                       CT Head Without Contrast (Final result)  Result time 04/26/25 18:22:54      Final result by Jong Song MD (04/26/25 18:22:54)                   Impression:      Findings concerning for recent infarction in the left caudate, the left putamen, in left corona radiata.  Consideration for MRI of the brain is suggested for further evaluation.    Case discussed with GUERA Keller on 04/26/2025 at 18:20.      Electronically signed by: Jong Song MD  Date:    04/26/2025  Time:    18:22               Narrative:    EXAMINATION:  CT HEAD WITHOUT CONTRAST    CLINICAL HISTORY:  Neuro deficit, acute, stroke suspected;    TECHNIQUE:  Low dose axial images were obtained through the head.  Coronal and  sagittal reformations were also performed. Contrast was not administered.    COMPARISON:  CT head dated 06/22/2024.    FINDINGS:  The subcutaneous tissue unremarkable.  The bony calvarium is intact.  The paranasal sinuses are unremarkable.  The mastoid air cells are clear.  There are postoperative changes in the globes.    The craniocervical junction is intact.  The sellar and parasellar structures are within normal limits.  There are no extra-axial fluid collections.  There is no evidence of intracranial hemorrhage.    The ventricles and sulci are prominent, consistent cerebral volume loss.  There areas of low attenuation involving the left caudate head, caudate body, and portions of the left putamen.  There is also involvement of the left corona radiata.  There is minimal amount of mass effect.  The remainder of the gray-white differentiation is maintained.  There is no dense vessel sign.                                       Medications   potassium chloride 10 mEq in 100 mL IVPB (has no administration in time range)   sodium chloride 0.9% flush 10 mL (has no administration in time range)   bisacodyL suppository 10 mg (has no administration in time range)   atorvastatin tablet 80 mg (has no administration in time range)   aspirin EC tablet 81 mg (has no administration in time range)   metoprolol tartrate (LOPRESSOR) tablet 50 mg (has no administration in time range)   diltiaZEM injection 15 mg (15 mg Intravenous Given 4/26/25 1839)   aspirin tablet 325 mg (325 mg Oral Given 4/26/25 1838)     Medical Decision Making  Differential Diagnosis includes, but is not limited to:  CVA/TIA, intracranial mass/hemorrhage, head trauma, seizure, status epilepticus, post-ictal state, meningitis/encephalitis, sepsis, MI/ACS, arrhythmia, syncope,   thyroid disease, neuroleptic malignant syndrome, serotonin syndrome, uremic/hepatic encephalopathy, medication reaction, intentional overdose, metabolic derangement, psychiatric  disturbance, substance abuse, alcohol intoxication/withdrawal, hypoglycemia/hyperglycemia, complicated migraine.     Amount and/or Complexity of Data Reviewed  Labs: ordered. Decision-making details documented in ED Course.  Radiology: ordered. Decision-making details documented in ED Course.    Risk  OTC drugs.  Prescription drug management.               ED Course as of 04/26/25 2133   Sat Apr 26, 2025   1813 CBC W/ AUTO DIFFERENTIAL(!) [DT]   1813 POCT COVID-19 Rapid Screening [DT]   1829 Afib RVR on EKG with rate of 161 noted. Pt has hx of Afib. Cardizem ordered.  [DT]   1830 EKG independently interpreted by me as rate 149 atrial fibrillation RVR right bundle-branch block no significant ST segment elevation or depression. [JS]   1849 CT Head Without Contrast  FINDINGS:  The subcutaneous tissue unremarkable.  The bony calvarium is intact.  The paranasal sinuses are unremarkable.  The mastoid air cells are clear.  There are postoperative changes in the globes.     The craniocervical junction is intact.  The sellar and parasellar structures are within normal limits.  There are no extra-axial fluid collections.  There is no evidence of intracranial hemorrhage.     The ventricles and sulci are prominent, consistent cerebral volume loss.  There areas of low attenuation involving the left caudate head, caudate body, and portions of the left putamen.  There is also involvement of the left corona radiata.  There is minimal amount of mass effect.  The remainder of the gray-white differentiation is maintained.  There is no dense vessel sign.     Impression:     Findings concerning for recent infarction in the left caudate, the left putamen, in left corona radiata.  Consideration for MRI of the brain is suggested for further evaluation. [KG]   1853 Comprehensive metabolic panel(!)  Potassium of 3.0; IV potassium 10 ordered. Creatinine wnl [KG]   1930 Spoke with Neurology- Isabel García. Requesting MRI, additional labs which  were added. Echo with saline bubble in addition to holding off on giving plavix till hemorrhage is ruled out on MRI [DT]   1940 On the line with Dr Kessler from cardiology. Requesting to be contacted once cleared for anticoagulation.  [DT]   2001 Patient accepted with [facility] medicine. No further recommendations at this time [KG]   2130 This patient was also discussed with attending, Dr. Salazar who agrees with ED course and dispo [KG]      ED Course User Index  [DT] Lyndsay Keller NP  [JS] Irineo Salazar MD  [KG] Maureen Silverman PA-C                           Clinical Impression:  Final diagnoses:  [R29.818] Acute focal neurological deficit  [I63.9] CVA (cerebral vascular accident)  [I48.91] Atrial fibrillation, unspecified type (Primary)  [E87.6] Hypokalemia          ED Disposition Condition    Observation                   Maureen Silverman PA-C  04/26/25 2054       Maureen Silverman PA-C  04/26/25 2133         [1]   Social History  Tobacco Use    Smoking status: Never    Smokeless tobacco: Never   Substance Use Topics    Alcohol use: No    Drug use: No        Maureen Silverman PA-C  04/26/25 2133

## 2025-04-26 NOTE — TELEPHONE ENCOUNTER
Reason for Disposition   [1] Weakness (i.e., paralysis, loss of muscle strength) of the face, arm / hand, or leg / foot on one side of the body AND [2] sudden onset AND [3] present now  (Exception: Bell's palsy suspected [weakness on one side of the face, over hours to days].)    Additional Information   Negative: [1] SEVERE weakness (i.e., unable to walk or barely able to walk, requires support) AND [2] new-onset or getting worse    Protocols used: Neurologic Deficit-A-AH  Pt's niece states the pt was treated in the ED (4/23/25) for N/V. States pt has severe weakness with weakness only in the rt arm. Advised per the Triage protocol to dial 911 for EMS. Pt's niece verbalized understanding.

## 2025-04-27 LAB
ABSOLUTE EOSINOPHIL (OHS): 0.02 K/UL
ABSOLUTE MONOCYTE (OHS): 1.06 K/UL (ref 0.3–1)
ABSOLUTE NEUTROPHIL COUNT (OHS): 6.09 K/UL (ref 1.8–7.7)
ALBUMIN SERPL BCP-MCNC: 3.1 G/DL (ref 3.5–5.2)
ALP SERPL-CCNC: 46 UNIT/L (ref 40–150)
ALT SERPL W/O P-5'-P-CCNC: 12 UNIT/L (ref 10–44)
ANION GAP (OHS): 7 MMOL/L (ref 8–16)
APTT PPP: 24.2 SECONDS (ref 21–32)
AST SERPL-CCNC: 15 UNIT/L (ref 11–45)
BASOPHILS # BLD AUTO: 0.01 K/UL
BASOPHILS NFR BLD AUTO: 0.1 %
BILIRUB SERPL-MCNC: 1.1 MG/DL (ref 0.1–1)
BUN SERPL-MCNC: 15 MG/DL (ref 8–23)
CALCIUM SERPL-MCNC: 8.7 MG/DL (ref 8.7–10.5)
CHLORIDE SERPL-SCNC: 110 MMOL/L (ref 95–110)
CO2 SERPL-SCNC: 26 MMOL/L (ref 23–29)
CREAT SERPL-MCNC: 0.6 MG/DL (ref 0.5–1.4)
ERYTHROCYTE [DISTWIDTH] IN BLOOD BY AUTOMATED COUNT: 12 % (ref 11.5–14.5)
GFR SERPLBLD CREATININE-BSD FMLA CKD-EPI: >60 ML/MIN/1.73/M2
GLUCOSE SERPL-MCNC: 134 MG/DL (ref 70–110)
HCT VFR BLD AUTO: 38.5 % (ref 37–48.5)
HGB BLD-MCNC: 12.6 GM/DL (ref 12–16)
IMM GRANULOCYTES # BLD AUTO: 0.02 K/UL (ref 0–0.04)
IMM GRANULOCYTES NFR BLD AUTO: 0.3 % (ref 0–0.5)
INR PPP: 1 (ref 0.8–1.2)
LYMPHOCYTES # BLD AUTO: 0.52 K/UL (ref 1–4.8)
MCH RBC QN AUTO: 30.7 PG (ref 27–31)
MCHC RBC AUTO-ENTMCNC: 32.7 G/DL (ref 32–36)
MCV RBC AUTO: 94 FL (ref 82–98)
NUCLEATED RBC (/100WBC) (OHS): 0 /100 WBC
PLATELET # BLD AUTO: 265 K/UL (ref 150–450)
PMV BLD AUTO: 9.3 FL (ref 9.2–12.9)
POTASSIUM SERPL-SCNC: 3.7 MMOL/L (ref 3.5–5.1)
PROT SERPL-MCNC: 5.9 GM/DL (ref 6–8.4)
PROTHROMBIN TIME: 11.8 SECONDS (ref 9–12.5)
RBC # BLD AUTO: 4.1 M/UL (ref 4–5.4)
RELATIVE EOSINOPHIL (OHS): 0.3 %
RELATIVE LYMPHOCYTE (OHS): 6.7 % (ref 18–48)
RELATIVE MONOCYTE (OHS): 13.7 % (ref 4–15)
RELATIVE NEUTROPHIL (OHS): 78.9 % (ref 38–73)
SODIUM SERPL-SCNC: 143 MMOL/L (ref 136–145)
WBC # BLD AUTO: 7.72 K/UL (ref 3.9–12.7)

## 2025-04-27 PROCEDURE — 97530 THERAPEUTIC ACTIVITIES: CPT

## 2025-04-27 PROCEDURE — 97535 SELF CARE MNGMENT TRAINING: CPT

## 2025-04-27 PROCEDURE — 27000221 HC OXYGEN, UP TO 24 HOURS

## 2025-04-27 PROCEDURE — 99223 1ST HOSP IP/OBS HIGH 75: CPT | Mod: ,,, | Performed by: INTERNAL MEDICINE

## 2025-04-27 PROCEDURE — 85730 THROMBOPLASTIN TIME PARTIAL: CPT | Performed by: STUDENT IN AN ORGANIZED HEALTH CARE EDUCATION/TRAINING PROGRAM

## 2025-04-27 PROCEDURE — 36415 COLL VENOUS BLD VENIPUNCTURE: CPT | Performed by: STUDENT IN AN ORGANIZED HEALTH CARE EDUCATION/TRAINING PROGRAM

## 2025-04-27 PROCEDURE — 99900035 HC TECH TIME PER 15 MIN (STAT)

## 2025-04-27 PROCEDURE — 51798 US URINE CAPACITY MEASURE: CPT

## 2025-04-27 PROCEDURE — 85025 COMPLETE CBC W/AUTO DIFF WBC: CPT | Performed by: STUDENT IN AN ORGANIZED HEALTH CARE EDUCATION/TRAINING PROGRAM

## 2025-04-27 PROCEDURE — 94761 N-INVAS EAR/PLS OXIMETRY MLT: CPT

## 2025-04-27 PROCEDURE — 97165 OT EVAL LOW COMPLEX 30 MIN: CPT

## 2025-04-27 PROCEDURE — 25000003 PHARM REV CODE 250: Performed by: STUDENT IN AN ORGANIZED HEALTH CARE EDUCATION/TRAINING PROGRAM

## 2025-04-27 PROCEDURE — 25000003 PHARM REV CODE 250: Performed by: HOSPITALIST

## 2025-04-27 PROCEDURE — 96361 HYDRATE IV INFUSION ADD-ON: CPT

## 2025-04-27 PROCEDURE — 51701 INSERT BLADDER CATHETER: CPT

## 2025-04-27 PROCEDURE — 97112 NEUROMUSCULAR REEDUCATION: CPT

## 2025-04-27 PROCEDURE — 80053 COMPREHEN METABOLIC PANEL: CPT | Performed by: STUDENT IN AN ORGANIZED HEALTH CARE EDUCATION/TRAINING PROGRAM

## 2025-04-27 PROCEDURE — 92610 EVALUATE SWALLOWING FUNCTION: CPT

## 2025-04-27 PROCEDURE — 11000001 HC ACUTE MED/SURG PRIVATE ROOM

## 2025-04-27 PROCEDURE — 97162 PT EVAL MOD COMPLEX 30 MIN: CPT

## 2025-04-27 PROCEDURE — 63600175 PHARM REV CODE 636 W HCPCS: Performed by: STUDENT IN AN ORGANIZED HEALTH CARE EDUCATION/TRAINING PROGRAM

## 2025-04-27 PROCEDURE — 85610 PROTHROMBIN TIME: CPT | Performed by: STUDENT IN AN ORGANIZED HEALTH CARE EDUCATION/TRAINING PROGRAM

## 2025-04-27 RX ORDER — ONDANSETRON HYDROCHLORIDE 2 MG/ML
4 INJECTION, SOLUTION INTRAVENOUS EVERY 12 HOURS PRN
Status: DISCONTINUED | OUTPATIENT
Start: 2025-04-27 | End: 2025-04-30 | Stop reason: HOSPADM

## 2025-04-27 RX ORDER — DICLOFENAC SODIUM 10 MG/G
2 GEL TOPICAL DAILY
Status: DISCONTINUED | OUTPATIENT
Start: 2025-04-27 | End: 2025-04-30 | Stop reason: HOSPADM

## 2025-04-27 RX ORDER — SODIUM CHLORIDE 0.9 % (FLUSH) 0.9 %
10 SYRINGE (ML) INJECTION
Status: DISCONTINUED | OUTPATIENT
Start: 2025-04-27 | End: 2025-04-30 | Stop reason: HOSPADM

## 2025-04-27 RX ORDER — METOPROLOL SUCCINATE 50 MG/1
100 TABLET, EXTENDED RELEASE ORAL DAILY
Status: DISCONTINUED | OUTPATIENT
Start: 2025-04-28 | End: 2025-04-30 | Stop reason: HOSPADM

## 2025-04-27 RX ORDER — POTASSIUM CHLORIDE 20 MEQ/1
40 TABLET, EXTENDED RELEASE ORAL ONCE
Status: COMPLETED | OUTPATIENT
Start: 2025-04-27 | End: 2025-04-27

## 2025-04-27 RX ORDER — ACETAMINOPHEN 325 MG/1
650 TABLET ORAL EVERY 6 HOURS PRN
Status: DISCONTINUED | OUTPATIENT
Start: 2025-04-27 | End: 2025-04-30 | Stop reason: HOSPADM

## 2025-04-27 RX ORDER — CLOPIDOGREL BISULFATE 75 MG/1
75 TABLET ORAL DAILY
Status: DISCONTINUED | OUTPATIENT
Start: 2025-04-28 | End: 2025-04-28

## 2025-04-27 RX ORDER — METOPROLOL SUCCINATE 50 MG/1
50 TABLET, EXTENDED RELEASE ORAL DAILY
Status: DISCONTINUED | OUTPATIENT
Start: 2025-04-28 | End: 2025-04-27

## 2025-04-27 RX ORDER — LABETALOL HYDROCHLORIDE 5 MG/ML
10 INJECTION, SOLUTION INTRAVENOUS
Status: DISCONTINUED | OUTPATIENT
Start: 2025-04-27 | End: 2025-04-30 | Stop reason: HOSPADM

## 2025-04-27 RX ADMIN — METOPROLOL TARTRATE 50 MG: 50 TABLET, FILM COATED ORAL at 01:04

## 2025-04-27 RX ADMIN — METOPROLOL TARTRATE 50 MG: 50 TABLET, FILM COATED ORAL at 05:04

## 2025-04-27 RX ADMIN — ASPIRIN 81 MG: 81 TABLET, COATED ORAL at 09:04

## 2025-04-27 RX ADMIN — DICLOFENAC SODIUM 2 G: 10 GEL TOPICAL at 01:04

## 2025-04-27 RX ADMIN — POTASSIUM CHLORIDE 40 MEQ: 1500 TABLET, EXTENDED RELEASE ORAL at 09:04

## 2025-04-27 RX ADMIN — ATORVASTATIN CALCIUM 80 MG: 40 TABLET, FILM COATED ORAL at 09:04

## 2025-04-27 RX ADMIN — ACETAMINOPHEN 650 MG: 325 TABLET ORAL at 12:04

## 2025-04-27 NOTE — SUBJECTIVE & OBJECTIVE
Past Medical History:   Diagnosis Date    Atrial fibrillation     Hypertension        Past Surgical History:   Procedure Laterality Date    HYSTERECTOMY      TONSILLECTOMY         Review of patient's allergies indicates:   Allergen Reactions    Amoxicillin Rash       No current facility-administered medications on file prior to encounter.     Current Outpatient Medications on File Prior to Encounter   Medication Sig    aspirin (ECOTRIN) 81 MG EC tablet Take 81 mg by mouth once daily.    atorvastatin (LIPITOR) 10 MG tablet Take 10 mg by mouth once daily.    calcium-vitamin D3 500 mg(1,250mg) -200 unit per tablet Take 1 tablet by mouth 2 (two) times daily with meals.    fish oil-omega-3 fatty acids 300-1,000 mg capsule Take 2 g by mouth once daily.    LIDOcaine (LIDODERM) 5 % Place 1 patch onto the skin once daily. Remove & Discard patch within 12 hours or as directed by MD    metoprolol succinate (TOPROL-XL) 100 MG 24 hr tablet Take 100 mg by mouth once daily.    multivitamin with minerals tablet Take 1 tablet by mouth once daily.    ondansetron (ZOFRAN-ODT) 4 MG TbDL Take 1 tablet (4 mg total) by mouth every 6 (six) hours as needed (nausea).    potassium chloride SA (K-DUR,KLOR-CON) 20 MEQ tablet Take 20 mEq by mouth once daily.     triamterene-hydrochlorothiazide 37.5-25 mg (MAXZIDE-25) 37.5-25 mg per tablet Take 1 tablet by mouth once daily.     Family History       Problem Relation (Age of Onset)    Heart attack Father    Heart disease Brother, Sister          Tobacco Use    Smoking status: Never    Smokeless tobacco: Never   Substance and Sexual Activity    Alcohol use: No    Drug use: No    Sexual activity: Not Currently     Review of Systems   Constitutional:  Positive for activity change.   HENT:          Right facial drop    Eyes: Negative.    Respiratory: Negative.     Cardiovascular: Negative.    Gastrointestinal: Negative.    Endocrine: Negative.    Genitourinary: Negative.    Musculoskeletal: Negative.     Skin: Negative.    Allergic/Immunologic: Negative.    Neurological:  Positive for weakness.   Hematological: Negative.    Psychiatric/Behavioral: Negative.       Objective:     Vital Signs (Most Recent):  Temp: 98.4 °F (36.9 °C) (04/26/25 1704)  Pulse: (!) 125 (04/26/25 1917)  Resp: (!) 22 (04/26/25 1917)  BP: (!) 156/90 (04/26/25 1917)  SpO2: 96 % (04/26/25 1917) Vital Signs (24h Range):  Temp:  [98.4 °F (36.9 °C)] 98.4 °F (36.9 °C)  Pulse:  [116-161] 125  Resp:  [18-22] 22  SpO2:  [96 %-97 %] 96 %  BP: (156-193)/() 156/90     Weight: 53.5 kg (118 lb)  Body mass index is 20.25 kg/m².     Physical Exam  Vitals reviewed.   HENT:      Head: Normocephalic and atraumatic.      Comments: Mild facial droop   Cardiovascular:      Rate and Rhythm: Rhythm irregular.   Pulmonary:      Effort: Pulmonary effort is normal.      Breath sounds: Normal breath sounds.   Abdominal:      Palpations: Abdomen is soft.   Musculoskeletal:         General: Normal range of motion.      Cervical back: Normal range of motion and neck supple.   Skin:     General: Skin is warm.      Capillary Refill: Capillary refill takes less than 2 seconds.   Neurological:      Mental Status: She is alert and oriented to person, place, and time.      Motor: Weakness (Right arm 3/5, left arm 5/5) present.      Comments: Right leg 4/5  Left leg 5/5     Psychiatric:         Mood and Affect: Mood normal.            Significant Labs: All pertinent labs within the past 24 hours have been reviewed.  CBC:   Recent Labs   Lab 04/26/25  1757   WBC 7.38   HGB 13.5   HCT 40.5        CMP:   Recent Labs   Lab 04/26/25  1757      K 3.0*      CO2 28   BUN 14   CREATININE 0.6   CALCIUM 8.9   ALBUMIN 3.6   BILITOT 0.9   ALKPHOS 52   AST 19   ALT 13   ANIONGAP 8       Significant Imaging:   CT head   Findings concerning for recent infarction in the left caudate, the left putamen, in left corona radiata.  Consideration for MRI of the brain is  suggested for further evaluation.

## 2025-04-27 NOTE — SUBJECTIVE & OBJECTIVE
Interval History: continued weakness, but overall in good spirits. Discussed stroke with patient and family at bedside. PT/OT/SLP consulted; will resume AC once cleared by Neurology. On SAPT currently but could potentially use AC as lone antithrombotic agent.    Review of Systems  Objective:     Vital Signs (Most Recent):  Temp: 98.5 °F (36.9 °C) (04/27/25 0800)  Pulse: 63 (04/27/25 0800)  Resp: 20 (04/27/25 0800)  BP: (!) 182/81 (04/27/25 0800)  SpO2: 98 % (04/27/25 0800) Vital Signs (24h Range):  Temp:  [98.1 °F (36.7 °C)-98.5 °F (36.9 °C)] 98.5 °F (36.9 °C)  Pulse:  [] 63  Resp:  [15-22] 20  SpO2:  [94 %-99 %] 98 %  BP: (153-193)/() 182/81     Weight: 52.3 kg (115 lb 4.8 oz)  Body mass index is 19.79 kg/m².    Intake/Output Summary (Last 24 hours) at 4/27/2025 1107  Last data filed at 4/27/2025 0544  Gross per 24 hour   Intake --   Output 700 ml   Net -700 ml         Physical Exam  Vitals reviewed.   HENT:      Head: Normocephalic and atraumatic.      Comments: Mild facial droop   Cardiovascular:      Rate and Rhythm: Rhythm irregular.   Pulmonary:      Effort: Pulmonary effort is normal.      Breath sounds: Normal breath sounds.   Abdominal:      Palpations: Abdomen is soft.   Musculoskeletal:         General: Normal range of motion.      Cervical back: Normal range of motion and neck supple.   Skin:     General: Skin is warm.      Capillary Refill: Capillary refill takes less than 2 seconds.   Neurological:      Mental Status: She is alert and oriented to person, place, and time.      Motor: Weakness (Right arm 3/5, left arm 5/5) present.      Comments: Right leg 4/5  Left leg 5/5     Psychiatric:         Mood and Affect: Mood normal.               Significant Labs: All pertinent labs within the past 24 hours have been reviewed.    Significant Imaging: I have reviewed all pertinent imaging results/findings within the past 24 hours.

## 2025-04-27 NOTE — NURSING
Pt with no urinary output arrival to this floor at 2310 after multiple attempts to encourage pt to urinate she denied need or feeling the need to urinate bladder scan resulted 506 ml straight cath performed 500 ml removed

## 2025-04-27 NOTE — ASSESSMENT & PLAN NOTE
Patient's blood pressure range in the last 24 hours was: BP  Min: 153/91  Max: 193/126.The patient's inpatient anti-hypertensive regimen is listed below:  Current Antihypertensives  metoprolol tartrate (LOPRESSOR) tablet 50 mg, Every 8 hours, Oral  labetaloL injection 10 mg, Every 15 min PRN, Intravenous  metoprolol succinate (TOPROL-XL) 24 hr tablet 50 mg, Daily, Oral    Plan  - BP is uncontrolled, will adjust as follows   - metoprolol tartrate->succinate  - discussed with neurology- avoid rapid drop in BP  - permissive HTN for now; will gradually add in additional antihypertensive agents

## 2025-04-27 NOTE — ASSESSMENT & PLAN NOTE
Known prior RBBB    EKG in Afib w RVR did show ST depressions in inferior leads and ST elevation in AVR. Repeat EKG patient at 9 pm patient in sinus rhythm with resolution of the depressions  No complaint of chest pain

## 2025-04-27 NOTE — ASSESSMENT & PLAN NOTE
EKG with Afib w RVR (in past in 2014 had Afib)    Start Metoprolol tartrate 50 mg Q8h PO; switch to succinate  Therapeutic Anticoagulation once okay per neurology.  Cardiology was consulted in ER  - rate controlled

## 2025-04-27 NOTE — PLAN OF CARE
Problem: SLP  Goal: SLP Goal  Description: Short Term Goals:  1. Pt will participate in swallow eval to determine safest diet level.  2. Pt will tolerate regular diet and thin liquids with no audible dysphagia signs  3. Pt will participate in speech/lang eval to determine deficits.  Outcome: Progressing   Pt safe for initiation of regular diet and thin liquids. Will eval communication baseline next date.

## 2025-04-27 NOTE — ASSESSMENT & PLAN NOTE
CT showing left caudate, left putamen and left corona radiata stroke.    Discussed with neurology- out of window for stroke interventions  Given aspirin in ER.     MRI brain done- infarct in the head of the caudate nucleus, anterior limb of the internal capsule, putamen and external capsule and corona radiata on the left     MRA head/neck without vessel occlusion     Plan:  Received aspirin 325 mg today in ER  Discussing with Neurology- they recommend holding off anticoagulation at this time.   Continue aspirin in AM   BP slowly downtrending. Will not aggresively reduce BP.   Will start back metoprolol tartrate 50 Q8h   PT/OT   ECHO with bubble   Telemetry monitoring   Keeping NPO for now   Evaluate swallowing with SLP  D5LR overnight   Q4 Neurochecks   Okay to admit on floor at this time.

## 2025-04-27 NOTE — HPI
85 yo F with hx of Hypertension, paroxysmal atrial fibrilaltion (not on AC), is bought in by EMS for Right hand weakness and facial droop, found to have acute stroke.    Patient notes she presented to ER on 4/23 with generalized weakness, nausea and vomiting. Her BP then was 196/126 per note. She notes she developed weakness in her right arm and hand on Thursday, and had difficulty walking. Her daughter and niece today decided to bring her to the ER. She hasn't been taking her medications since last 2 days as well, and is tired and in bed. She denies any headaches currently, chest pain or shortness of breath.   She denies visual deficit, cough, abdominal pain urinary discomfort.     Home medications only significant for   Current Outpatient Medications   Medication Instructions    aspirin (ECOTRIN) 81 mg, Oral, Daily    atorvastatin (LIPITOR) 10 mg, Oral, Daily    calcium-vitamin D3 500 mg(1,250mg) -200 unit per tablet 1 tablet, Oral, 2 times daily with meals    fish oil-omega-3 fatty acids 300-1,000 mg capsule 2 g, Oral, Daily    LIDOcaine (LIDODERM) 5 % 1 patch, Transdermal, Daily, Remove & Discard patch within 12 hours or as directed by MD    metoprolol succinate (TOPROL-XL) 100 mg, Oral, Daily    multivitamin with minerals tablet 1 tablet, Oral, Daily    ondansetron (ZOFRAN-ODT) 4 mg, Oral, Every 6 hours PRN    potassium chloride SA (K-DUR,KLOR-CON) 20 MEQ tablet 20 mEq, Oral, Daily    triamterene-hydrochlorothiazide 37.5-25 mg (MAXZIDE-25) 37.5-25 mg per tablet 1 tablet, Oral, Daily

## 2025-04-27 NOTE — CONSULTS
RD providing remote coverage. Consulted for heart healthy diet education.     Clinical reference materials/handouts for Heart Healthy Diet MNT attached to discharge documents.      On site RD to provide in-person education as warranted at follow up.   Education materials to be provided with discharge instructions.          Please re-consult as needed.      Thank you.     Jade Hylton, MANISHAN, LDN

## 2025-04-27 NOTE — PT/OT/SLP EVAL
Occupational Therapy   Evaluation    Name: Aimee Dent  MRN: [id number]  Admitting Diagnosis: Acute ischemic stroke  Recent Surgery: * No surgery found *      Recommendations:     Discharge Recommendations: High Intensity Therapy  Discharge Equipment Recommendations:  to be determined by next level of care  Barriers to discharge:  None    Assessment:     Aimee Dent is a 84 y.o. female with a medical diagnosis of Acute ischemic stroke.  She presents with the following performance deficits affecting function: weakness, impaired endurance, impaired sensation, impaired self care skills, impaired functional mobility, gait instability, impaired balance, decreased coordination, decreased upper extremity function, decreased lower extremity function, decreased safety awareness, abnormal tone, decreased ROM, impaired coordination, impaired fine motor.      Pt was agreeable to and participated in OT/PT evaluation.  Pt reports that she was independent with mobility at [facility].  Evaluation completed and pt noted to require mod - mod A x2 with func mobility and setup - max A with ADLS.  Pt noted with RUE/LE weakness and impaired motor control (UE worse than LE) which impaired her level of independence. Pt also noted with a right sided lean/rotation when standing and attempting to take steps. Goals established to assist pt with returning to [facility] regarding ADLs and func mobility.  Pt will benefit from skilled OT services in order to increase her level of safety and independence with ADLs and mobility.      Rehab Prognosis: Good; patient would benefit from acute skilled OT services to address these deficits and reach maximum level of function.       Plan:     Patient to be seen 5 x/week to address the above listed problems via self-care/home management, therapeutic activities, therapeutic exercises, neuromuscular re-education  Plan of Care Expires: 05/27/25  Plan of Care Reviewed with: patient, daughter, family    Subjective      Chief Complaint: R sided weakness  Patient/Family Comments/goals: to return to PLOF    Occupational Profile:  Living Environment: pt lives with grandson in a SSH with no steps - WIS for bathing with THE to enter  Previous level of function: independent with mobility and ADLS - daughter recently purchased RW due to sudden LE weakness  Equipment Used at Home: walker, rolling (just recently purchased due to LE weakness)  Assistance upon Discharge: from family    Pain/Comfort:  Pain Rating 1: 0/10  Pain Rating Post-Intervention 1: 0/10    Patients cultural, spiritual, Nondenominational conflicts given the current situation: no    Objective:     Communicated with: nurse prior to session.  Patient found HOB elevated with bed alarm, oxygen, telemetry, peripheral IV, SCD upon OT entry to room.    General Precautions: Standard, fall  Orthopedic Precautions: N/A  Braces: N/A  Respiratory Status: Nasal cannula, flow 2 L/min    Occupational Performance:    Bed Mobility:    Patient completed Scooting/Bridging with bridging = SBA; scooting to EOB = min A  Patient completed Supine to Sit with mod A x2  Patient completed Sit to Supine with moderate assistance    Functional Mobility/Transfers:  Patient completed Sit <> Stand Transfer with mod A x2  with  hand-held assist   Functional Mobility:   Pt noted with R sided lean and R rotation when assuming standing position - responded to cues to straighten posture by leaning L with min A  Able to take a few steps forwards/back and to L (towards HOB) with mod A x2    Activities of Daily Living:  Grooming: minimum assistance to wash face with R hand - increased time needed  Lower Body Dressing: maximal assistance with socks    Cognitive/Visual Perceptual:  Cognitive/Psychosocial Skills:     -       Oriented to: Person, Place, Time, and Situation   -       Follows Commands/attention:Follows one-step commands  -       Communication: clear/fluent and very low volume  -       Memory: No Deficits  noted  -       Safety awareness/insight to disability: impaired   -       Mood/Affect/Coping skills/emotional control: Appropriate to situation    Physical Exam:  Balance: -       sitting = min A with noted R sided lean; Standing = mod A x2 with noted R sided lean and R rotation  Postural examination/scapula alignment:    -       Rounded shoulders  -       Forward head  -       R sided lean  Skin integrity: Visible skin intact  Edema:  None noted  Sensation: -       Impaired  light/touch on R UE from forearm to hand  Motor Planning: -       slight delay with initiation and completion of movements of R UE  Dominant hand: -       right  Upper Extremity Range of Motion/Strength:     - LUE = WFL;    - RUE = limited shoulder flexion, elbow extension and wrist extension   - Delayed initiation with movements of R UE but able to reach ~90% of range  - Weak grasp on R hand  Fine Motor Coordination:  able to partially perform thumb opposition on R hand       AMPAC 6 Click ADL:  AMPAC Total Score: 15    Treatment & Education:  Pt completed ADLs and func mobility activities for tx session as noted above  Increased MT noted on R UE and decreased sensation from forearm to hand  Able to perform AROM on RUE to majority of range with increased time and with gravity elimination  Pt educated on role of OT and POC      Patient left HOB elevated with all lines intact, call button in reach, bed alarm on, nurse notified, and family present    GOALS:   Multidisciplinary Problems       Occupational Therapy Goals          Problem: Occupational Therapy    Goal Priority Disciplines Outcome Interventions   Occupational Therapy Goal     OT, PT/OT Progressing    Description: Goals to be met by: 05/26/25     Patient will increase functional independence with ADLs by performing:    UE Dressing with Set-up Assistance.  LE Dressing with Set-up Assistance and Assistive Devices as needed.  Grooming while standing at sink with Stand-by  Assistance.  Toileting from bedside commode with Stand-by Assistance for hygiene and clothing management.   Toilet transfer to bedside commode with Stand-by Assistance.  Upper extremity exercise program 3x15 reps per handout, with assistance as needed.                         DME Justifications:  TBD at next level of care    History:     Past Medical History:   Diagnosis Date    Atrial fibrillation     Hypertension          Past Surgical History:   Procedure Laterality Date    HYSTERECTOMY      TONSILLECTOMY         Time Tracking:     OT Date of Treatment: 04/27/25  OT Start Time: 1009  OT Stop Time: 1032  OT Total Time (min): 23 min - coeval with PT    Billable Minutes:Evaluation 10  Neuromuscular Re-education 13    4/27/2025

## 2025-04-27 NOTE — PLAN OF CARE
04/27/25 1307   Admission   Initial VN Admission Questions Complete   Communication Issues? None   Shift   Virtual Nurse - Patient Verbalized Approval Of Camera Use;VN Rounding   Safety/Activity   Patient Rounds bed in low position;placement of personal items at bedside;bed wheels locked;toileting offered;call light in patient/parent reach;visualized patient;clutter free environment maintained;ID band on   Safety Promotion/Fall Prevention assistive device/personal item within reach;Fall Risk reviewed with patient/family;family expresses understanding of fall risk and prevention;side rails raised x 2   Positioning   Head of Bed (HOB) Positioning HOB at 30 degrees     VN cued into patient's room for introduction with patient's permission.  VN role explained and informed patient/family that VN would be working with bedside nurse and rest of care team.  Plan of care reviewed, pt encouraged to refer to whiteboard to determine staff for the day and pt/family educated on VTE,  fall risk and advised to call for assistance at all times to ensure pt's safety. Pt is without any signs of pain, anxiety, dyspnea or nausea. Patient's chart, labs and vital signs reviewed.  Allowed time for questions.  Will continue to be available as needed.

## 2025-04-27 NOTE — PT/OT/SLP EVAL
Physical Therapy Evaluation and Treatment    Patient Name:  Aimee Dent   MRN:  [id number]    Recommendations:     Discharge Recommendations: High Intensity Therapy   Discharge Equipment Recommendations: to be determined by next level of care, bedside commode   Barriers to discharge:  limited functional endurance/independence    Assessment:     Aimee Dent is a 84 y.o. female admitted with a medical diagnosis of Acute ischemic stroke.  She presents with the following impairments/functional limitations: weakness, impaired endurance, impaired self care skills, impaired functional mobility, gait instability, impaired balance, decreased lower extremity function, decreased ROM    PT/OT co-evaluation completed due to anticipated complexity of pt's presentation. Pt's PLOF: Independent with no AD. Pt at this time requires MOD Ax2 with bed mobility and transfers to standing/short mobility. PT recommending high intensity therapy to assist pt in return to independent PLOF. Therapy will continue to progress pt as able.     Rehab Prognosis: Good; patient would benefit from acute skilled PT services to address these deficits and reach maximum level of function.    Recent Surgery: * No surgery found *      Plan:     During this hospitalization, patient to be seen 5 x/week to address the identified rehab impairments via gait training, therapeutic activities, therapeutic exercises, neuromuscular re-education and progress toward the following goals:    Plan of Care Expires:  05/27/25    Subjective     Chief Complaint: weakness/fatigue  Patient/Family Comments/goals: to progress mobility  Pain/Comfort:  Pain Rating 1: 0/10  Pain Rating Post-Intervention 1: 0/10    Patients cultural, spiritual, Episcopal conflicts given the current situation: no    Living Environment:  Lives with grandson in 1 story home with no steps to enter. WIS.   Prior to admission, patients level of function was Independent with no AD.  Equipment used  at home: walker, rolling.  DME owned (not currently used): none.  Upon discharge, patient will have assistance from family (unsure of ).    Objective:     Communicated with Nurse prior to session.  Patient found HOB elevated with bed alarm, oxygen, telemetry, peripheral IV, SCD  upon PT entry to room.    General Precautions: Standard, fall  Orthopedic Precautions:N/A   Braces: N/A  Respiratory Status: Nasal cannula, flow 2 L/min    Exams:  Cognitive Exam:  Patient is oriented to Person, Place, Time, and Situation  Sensation:    -       Intact  light/touch to BLE  RLE ROM: limited due to weakness  RLE Strength: 2-/5 hip flexion, 3-/5 knee extension, 3-/5 ankle PF/DF  LLE ROM: limited due to weakness  LLE Strength: 3/5 hip flexion, 3+/5 knee extension, 3+/5 ankle PF/DF     Functional Mobility:  Bed Mobility:     Supine to Sit: moderate assistance and of 2 persons  Sit to Supine: moderate assistance and of 2 persons  Transfers:     Sit to Stand:  moderate assistance and of 2 persons with B hand-held assist  Gait: ~2 BLE forward/retro/lateral steps away from bedside with MOD Ax2 with B HHA.       AM-PAC 6 CLICK MOBILITY  Total Score:12       Treatment & Education:  Pt educated on role of PT.   Pt with noted R trunk rotation and R sided leaning in sitting/standing; requires cues and assistance to improve.   Pt with increased fatigue with mobility.   Pt educated on use of call button; pt understanding.     Patient left HOB elevated with all lines intact, call button in reach, bed alarm on, Nurse notified, and family present.    GOALS:   Multidisciplinary Problems       Physical Therapy Goals          Problem: Physical Therapy    Goal Priority Disciplines Outcome Interventions   Physical Therapy Goal     PT, PT/OT Progressing    Description: Goals to be met by: 25     Patient will increase functional independence with mobility by performin. Supine to sit with Stand-by Assistance  2. Sit to supine with  Stand-by Assistance  3. Sit to stand transfer with Stand-by Assistance with LRAD.   4. Bed to chair transfer with Stand-by Assistance using LRAD.   5. Gait  x 50 feet with Contact Guard Assistance using LRAD.                            This patient requires a bedside commode / 3 in 1 commode because they are physically incapable of utilizing their regular toilet facility for a 30-90 day period due to current non-ambulatory status. The patient will be confined to a single room or to one level of the home and there is no toilet on that level, or there are no toilet facilities in the home.          History:     Past Medical History:   Diagnosis Date    Atrial fibrillation     Hypertension        Past Surgical History:   Procedure Laterality Date    HYSTERECTOMY      TONSILLECTOMY         Time Tracking:     PT Received On: 04/27/25  PT Start Time: 1009     PT Stop Time: 1031  PT Total Time (min): 22 min With OT    Billable Minutes: Evaluation 10 and Therapeutic Activity 10      04/27/2025

## 2025-04-27 NOTE — PT/OT/SLP EVAL
Speech Language Pathology Evaluation  Bedside Swallow    Patient Name:  Aimee Dent   MRN:  [id number]  Admitting Diagnosis: Acute ischemic stroke    Recommendations:                 General Recommendations:  ongoing assessment of speech/lang and diet follow up   Diet recommendations:  Regular Diet - IDDSI Level 7, Thin liquids - IDDSI Level 0   Aspiration Precautions: standard precautions    General Precautions: Standard, fall  Communication strategies:  low and soft voice     Assessment:     Aimee Dent is a 84 y.o. female admitted with CVA who is safe for continued oral diet, ongoing assessment of speech/lang to determine baseline.   History per MD      Chief Complaint   Patient presents with    Extremity Weakness       BIB [facility] for right sided weakness first noticed ~Thursday morning when she woke up. EMS reports right sided facial droop. LKW Wednesday night.  per EMS.  VAN positive upon initial assessment, outside of activation window.         HPI: 85 yo F with hx of Hypertension, paroxysmal atrial fibrilaltion (not on AC), is bought in by EMS for Right hand weakness and facial droop, found to have acute stroke.     Patient notes she presented to ER on 4/23 with generalized weakness, nausea and vomiting. Her BP then was 196/126 per note. She notes she developed weakness in her right arm and hand on Thursday, and had difficulty walking. Her daughter and niece today decided to bring her to the ER. She hasn't been taking her medications since last 2 days as well, and is tired and in bed. She denies any headaches currently, chest pain or shortness of breath.   She denies visual deficit, cough, abdominal pain urinary discomfort.        Past Medical History:   Diagnosis Date    Atrial fibrillation     Hypertension        Past Surgical History:   Procedure Laterality Date    HYSTERECTOMY      TONSILLECTOMY         Social History: Patient lives at home, indep with ADLs.    Chest X-Rays:  Lungs and  "pleural spaces are clear.  Degenerative changes in the bones.     MRI: Restricted diffusion in the region of low density seen on earlier CT compatible with central left infarct without evidence of hemorrhage.  Other causes of abnormal brain signal and restricted diffusion in this region are considered less likely.   Neurology follow-up as clinically warranted.   Chronic small vessel type changes and involutional changes elsewhere appropriate for age.     Prior diet: reg/thin, no issues swallowing     Subjective     Consult received for clinical swallow eval/speech/lang eval this date, SLP did communicate with RN prior to eval/treat.    Patient goals: "my hand hurts."     Pain/Comfort:  Pain Rating 1: 10/10  Location 1:  (R hand/wrist)  Pain Addressed 1: Nurse notified    Respiratory Status: room air     Objective:   Pt seen in room, dtr and niece present.   Pt is awake, complaining of discomfort in her R hand/wrist. MD is aware.   Pt is verbal, able to state name, , place and name family members in her room.   Pt with low vocal volume but remains intelligible.     Oral Musculature Evaluation  Oral Musculature: general weakness, facial asymmetry present  Dentition: present and adequate  Mucosal Quality: adequate  Mandibular Strength and Mobility: WFL  Oral Labial Strength and Mobility: WFL  Lingual Strength and Mobility: WFL  Volitional Cough: elicited  Volitional Swallow: timely  Voice Prior to PO Intake: clear voice    Bedside Swallow Eval:   Consistencies Assessed:  Thin liquids water self fed by cup and straw   Puree self fed whole pudding container   Solids self fed cracker       Oral Phase:   WFL    Pharyngeal Phase:   no overt clinical signs/symptoms of aspiration  no overt clinical signs/symptoms of pharyngeal dysphagia    Compensatory Strategies  Multiple swallows   Alternate sips and bites  Upright for meals  Single straw sips    Treatment: SLP provided patient and family education on SLP " recommendations, SLP role, s/s and risks of aspiration, safe swallow precautions, and POC. The patient and family v/u of all discussed and are in agreement with POC.       Goals:   Multidisciplinary Problems       SLP Goals          Problem: SLP    Goal Priority Disciplines Outcome   SLP Goal     SLP Progressing   Description: Short Term Goals:  1. Pt will participate in swallow eval to determine safest diet level.  2. Pt will tolerate regular diet and thin liquids with no audible dysphagia signs  3. Pt will participate in speech/lang eval to determine deficits.                       Plan:     Patient to be seen:  3 x/week   Plan of Care expires:  05/26/25  Plan of Care reviewed with:  patient, daughter   SLP Follow-Up:  Yes       Discharge recommendations:  High Intensity Therapy   Barriers to Discharge:  None    Time Tracking:     SLP Treatment Date:   04/27/25  Speech Start Time:  1110  Speech Stop Time:  1130     Speech Total Time (min):  20 min    Billable Minutes: Eval Swallow and Oral Function 10 and Self Care/Home Management Training 10    04/27/2025

## 2025-04-27 NOTE — PLAN OF CARE
Problem: Occupational Therapy  Goal: Occupational Therapy Goal  Description: Goals to be met by: 05/26/25     Patient will increase functional independence with ADLs by performing:    UE Dressing with Set-up Assistance.  LE Dressing with Set-up Assistance and Assistive Devices as needed.  Grooming while standing at sink with Stand-by Assistance.  Toileting from bedside commode with Stand-by Assistance for hygiene and clothing management.   Toilet transfer to bedside commode with Stand-by Assistance.  Upper extremity exercise program 3x15 reps per handout, with assistance as needed.    Outcome: Progressing     Pt was agreeable to and participated in OT/PT evaluation.  Pt reports that she was independent with mobility at PLOF.  Evaluation completed and pt noted to require mod - mod A x2 with func mobility and setup - max A with ADLS.  Pt noted with RUE/LE weakness and impaired motor control (UE worse than LE) which impaired her level of independence. Goals established to assist pt with returning to PLOF regarding ADLs and func mobility.  Pt will benefit from skilled OT services in order to increase her level of safety and independence with ADLs and mobility.      Candace Colvin, OT  4/27/2025

## 2025-04-27 NOTE — H&P
[facility] Medicine  History & Physical    Patient Name: Aimee Dent  MRN: [id number]  Patient Class: OP- Observation  Admission Date: 4/26/2025  Attending Physician: Candelaria Walker MD   Primary Care Provider: Veena Primary Doctor         Patient information was obtained from patient, relative(s), and ER records.     Subjective:     Principal Problem:<principal problem not specified>    Chief Complaint:   Chief Complaint   Patient presents with    Extremity Weakness     BIB [facility] for right sided weakness first noticed ~Thursday morning when she woke up. EMS reports right sided facial droop. LKW Wednesday night.  per EMS.  VAN positive upon initial assessment, outside of activation window.        HPI: 83 yo F with hx of Hypertension, paroxysmal atrial fibrilaltion (not on AC), is bought in by EMS for Right hand weakness and facial droop, found to have acute stroke.    Patient notes she presented to ER on 4/23 with generalized weakness, nausea and vomiting. Her BP then was 196/126 per note. She notes she developed weakness in her right arm and hand on Thursday, and had difficulty walking. Her daughter and niece today decided to bring her to the ER. She hasn't been taking her medications since last 2 days as well, and is tired and in bed. She denies any headaches currently, chest pain or shortness of breath.   She denies visual deficit, cough, abdominal pain urinary discomfort.     Home medications only significant for   Current Outpatient Medications   Medication Instructions    aspirin (ECOTRIN) 81 mg, Oral, Daily    atorvastatin (LIPITOR) 10 mg, Oral, Daily    calcium-vitamin D3 500 mg(1,250mg) -200 unit per tablet 1 tablet, Oral, 2 times daily with meals    fish oil-omega-3 fatty acids 300-1,000 mg capsule 2 g, Oral, Daily    LIDOcaine (LIDODERM) 5 % 1 patch, Transdermal, Daily, Remove & Discard patch within 12 hours or as directed by MD    metoprolol succinate (TOPROL-XL) 100  mg, Oral, Daily    multivitamin with minerals tablet 1 tablet, Oral, Daily    ondansetron (ZOFRAN-ODT) 4 mg, Oral, Every 6 hours PRN    potassium chloride SA (K-DUR,KLOR-CON) 20 MEQ tablet 20 mEq, Oral, Daily    triamterene-hydrochlorothiazide 37.5-25 mg (MAXZIDE-25) 37.5-25 mg per tablet 1 tablet, Oral, Daily         Past Medical History:   Diagnosis Date    Atrial fibrillation     Hypertension        Past Surgical History:   Procedure Laterality Date    HYSTERECTOMY      TONSILLECTOMY         Review of patient's allergies indicates:   Allergen Reactions    Amoxicillin Rash       No current facility-administered medications on file prior to encounter.     Current Outpatient Medications on File Prior to Encounter   Medication Sig    aspirin (ECOTRIN) 81 MG EC tablet Take 81 mg by mouth once daily.    atorvastatin (LIPITOR) 10 MG tablet Take 10 mg by mouth once daily.    calcium-vitamin D3 500 mg(1,250mg) -200 unit per tablet Take 1 tablet by mouth 2 (two) times daily with meals.    fish oil-omega-3 fatty acids 300-1,000 mg capsule Take 2 g by mouth once daily.    LIDOcaine (LIDODERM) 5 % Place 1 patch onto the skin once daily. Remove & Discard patch within 12 hours or as directed by MD    metoprolol succinate (TOPROL-XL) 100 MG 24 hr tablet Take 100 mg by mouth once daily.    multivitamin with minerals tablet Take 1 tablet by mouth once daily.    ondansetron (ZOFRAN-ODT) 4 MG TbDL Take 1 tablet (4 mg total) by mouth every 6 (six) hours as needed (nausea).    potassium chloride SA (K-DUR,KLOR-CON) 20 MEQ tablet Take 20 mEq by mouth once daily.     triamterene-hydrochlorothiazide 37.5-25 mg (MAXZIDE-25) 37.5-25 mg per tablet Take 1 tablet by mouth once daily.     Family History       Problem Relation (Age of Onset)    Heart attack Father    Heart disease Brother, Sister          Tobacco Use    Smoking status: Never    Smokeless tobacco: Never   Substance and Sexual Activity    Alcohol use: No    Drug use: No    Sexual  activity: Not Currently     Review of Systems   Constitutional:  Positive for activity change.   HENT:          Right facial drop    Eyes: Negative.    Respiratory: Negative.     Cardiovascular: Negative.    Gastrointestinal: Negative.    Endocrine: Negative.    Genitourinary: Negative.    Musculoskeletal: Negative.    Skin: Negative.    Allergic/Immunologic: Negative.    Neurological:  Positive for weakness.   Hematological: Negative.    Psychiatric/Behavioral: Negative.       Objective:     Vital Signs (Most Recent):  Temp: 98.4 °F (36.9 °C) (04/26/25 1704)  Pulse: (!) 125 (04/26/25 1917)  Resp: (!) 22 (04/26/25 1917)  BP: (!) 156/90 (04/26/25 1917)  SpO2: 96 % (04/26/25 1917) Vital Signs (24h Range):  Temp:  [98.4 °F (36.9 °C)] 98.4 °F (36.9 °C)  Pulse:  [116-161] 125  Resp:  [18-22] 22  SpO2:  [96 %-97 %] 96 %  BP: (156-193)/() 156/90     Weight: 53.5 kg (118 lb)  Body mass index is 20.25 kg/m².     Physical Exam  Vitals reviewed.   HENT:      Head: Normocephalic and atraumatic.      Comments: Mild facial droop   Cardiovascular:      Rate and Rhythm: Rhythm irregular.   Pulmonary:      Effort: Pulmonary effort is normal.      Breath sounds: Normal breath sounds.   Abdominal:      Palpations: Abdomen is soft.   Musculoskeletal:         General: Normal range of motion.      Cervical back: Normal range of motion and neck supple.   Skin:     General: Skin is warm.      Capillary Refill: Capillary refill takes less than 2 seconds.   Neurological:      Mental Status: She is alert and oriented to person, place, and time.      Motor: Weakness (Right arm 3/5, left arm 5/5) present.      Comments: Right leg 4/5  Left leg 5/5     Psychiatric:         Mood and Affect: Mood normal.            Significant Labs: All pertinent labs within the past 24 hours have been reviewed.  CBC:   Recent Labs   Lab 04/26/25 1757   WBC 7.38   HGB 13.5   HCT 40.5        CMP:   Recent Labs   Lab 04/26/25 1757      K 3.0*       CO2 28   BUN 14   CREATININE 0.6   CALCIUM 8.9   ALBUMIN 3.6   BILITOT 0.9   ALKPHOS 52   AST 19   ALT 13   ANIONGAP 8       Significant Imaging:   CT head   Findings concerning for recent infarction in the left caudate, the left putamen, in left corona radiata.  Consideration for MRI of the brain is suggested for further evaluation.     Assessment/Plan:     Assessment & Plan  Acute ischemic stroke  CT showing left caudate, left putamen and left corona radiata stroke.    Discussed with neurology- out of window for stroke interventions  Given aspirin in ER.     MRI brain done- infarct in the head of the caudate nucleus, anterior limb of the internal capsule, putamen and external capsule and corona radiata on the left     MRA head/neck without vessel occlusion     Plan:  Received aspirin 325 mg today in ER  Discussing with Neurology- they recommend holding off anticoagulation at this time.   Continue aspirin in AM   BP slowly downtrending. Will not aggresively reduce BP.   Will start back metoprolol tartrate 50 Q8h   PT/OT   ECHO with bubble   Telemetry monitoring   Keeping NPO for now   Evaluate swallowing with SLP  D5LR overnight   Q4 Neurochecks   Okay to admit on floor at this time.   Atrial fibrillation with rapid ventricular response  EKG with Afib w RVR (in past in 2014 had Afib)    Start Metoprolol tartrate 50 mg Q8h PO  Therapeutic Anticoagulation once okay per neurology.  Cardiology was consulted in ER        RBBB  Known prior RBBB    EKG in Afib w RVR did show ST depressions in inferior leads and ST elevation in AVR. Repeat EKG patient at 9 pm patient in sinus rhythm with resolution of the depressions  No complaint of chest pain     Hypokalemia  Repleting with IV KCL   Primary hypertension  Patient's blood pressure range in the last 24 hours was: BP  Min: 153/91  Max: 193/126.The patient's inpatient anti-hypertensive regimen is listed below:  Current Antihypertensives  metoprolol tartrate  (LOPRESSOR) tablet 50 mg, Every 8 hours, Oral    Plan  - BP is uncontrolled, will adjust as follows   - Increased metoprolol tartarate to 50 mg Q8H  - discussed with neurology- avoid rapid drop in BP.   VTE Risk Mitigation (From admission, onward)           Ordered     Reason for No Pharmacological VTE Prophylaxis  Once        Question:  Reasons:  Answer:  Physician Provided (leave comment)  Comment:  pending Head MRI    04/26/25 2039     IP VTE HIGH RISK PATIENT  Once         04/26/25 2039     Place sequential compression device  Until discontinued         04/26/25 2039                  SCD's for DVT Ppx, can start therapeutic anticoagulation once okay with primary neurologist.     On 04/26/2025, patient should be placed in hospital observation services under my care.    Discussed results of MRI. Admitted to floor.     Full code:    Contact: Daughter Natalie- [phone], Rossi (niece) [phone]  Rossi would like to be called for decisions.          Candelaria Walker MD  Department of Hospital Medicine  [facility] - Emergency Dept

## 2025-04-27 NOTE — ASSESSMENT & PLAN NOTE
EKG with Afib w RVR (in past in 2014 had Afib)    Start Metoprolol tartrate 50 mg Q8h PO  Therapeutic Anticoagulation once okay per neurology.  Cardiology was consulted in ER

## 2025-04-27 NOTE — PLAN OF CARE
VIRTUAL NURSE: Did not cue into patient's room D/T floor nurse reporting pt. Is resting and family has left for the night. Notified floor nurse to let me know when pt. Wakes up to complete admit questions.    Labs, notes, orders, and careplan reviewed.

## 2025-04-27 NOTE — ASSESSMENT & PLAN NOTE
Patient's blood pressure range in the last 24 hours was: BP  Min: 153/91  Max: 193/126.The patient's inpatient anti-hypertensive regimen is listed below:  Current Antihypertensives  metoprolol tartrate (LOPRESSOR) tablet 50 mg, Every 8 hours, Oral    Plan  - BP is uncontrolled, will adjust as follows   - Increased metoprolol tartarate to 50 mg Q8H  - discussed with neurology- avoid rapid drop in BP.

## 2025-04-27 NOTE — PROGRESS NOTES
[facility] Medicine  Progress Note    Patient Name: Aimee Dent  MRN: [id number]  Patient Class: OP- Observation   Admission Date: 4/26/2025  Length of Stay: 0 days  Attending Physician: Royce Padron,*  Primary Care Provider: Veena, Primary Doctor        Subjective     Principal Problem:Acute ischemic stroke        HPI:  83 yo F with hx of Hypertension, paroxysmal atrial fibrilaltion (not on AC), is bought in by EMS for Right hand weakness and facial droop, found to have acute stroke.    Patient notes she presented to ER on 4/23 with generalized weakness, nausea and vomiting. Her BP then was 196/126 per note. She notes she developed weakness in her right arm and hand on Thursday, and had difficulty walking. Her daughter and niece today decided to bring her to the ER. She hasn't been taking her medications since last 2 days as well, and is tired and in bed. She denies any headaches currently, chest pain or shortness of breath.   She denies visual deficit, cough, abdominal pain urinary discomfort.     Home medications only significant for   Current Outpatient Medications   Medication Instructions    aspirin (ECOTRIN) 81 mg, Oral, Daily    atorvastatin (LIPITOR) 10 mg, Oral, Daily    calcium-vitamin D3 500 mg(1,250mg) -200 unit per tablet 1 tablet, Oral, 2 times daily with meals    fish oil-omega-3 fatty acids 300-1,000 mg capsule 2 g, Oral, Daily    LIDOcaine (LIDODERM) 5 % 1 patch, Transdermal, Daily, Remove & Discard patch within 12 hours or as directed by MD    metoprolol succinate (TOPROL-XL) 100 mg, Oral, Daily    multivitamin with minerals tablet 1 tablet, Oral, Daily    ondansetron (ZOFRAN-ODT) 4 mg, Oral, Every 6 hours PRN    potassium chloride SA (K-DUR,KLOR-CON) 20 MEQ tablet 20 mEq, Oral, Daily    triamterene-hydrochlorothiazide 37.5-25 mg (MAXZIDE-25) 37.5-25 mg per tablet 1 tablet, Oral, Daily         Overview/Hospital Course:  No notes on file    Interval History: continued  weakness, but overall in good spirits. Discussed stroke with patient and family at bedside. PT/OT/SLP consulted; will resume AC once cleared by Neurology. On SAPT currently but could potentially use AC as lone antithrombotic agent.    Review of Systems  Objective:     Vital Signs (Most Recent):  Temp: 98.5 °F (36.9 °C) (04/27/25 0800)  Pulse: 63 (04/27/25 0800)  Resp: 20 (04/27/25 0800)  BP: (!) 182/81 (04/27/25 0800)  SpO2: 98 % (04/27/25 0800) Vital Signs (24h Range):  Temp:  [98.1 °F (36.7 °C)-98.5 °F (36.9 °C)] 98.5 °F (36.9 °C)  Pulse:  [] 63  Resp:  [15-22] 20  SpO2:  [94 %-99 %] 98 %  BP: (153-193)/() 182/81     Weight: 52.3 kg (115 lb 4.8 oz)  Body mass index is 19.79 kg/m².    Intake/Output Summary (Last 24 hours) at 4/27/2025 1107  Last data filed at 4/27/2025 0544  Gross per 24 hour   Intake --   Output 700 ml   Net -700 ml         Physical Exam  Vitals reviewed.   HENT:      Head: Normocephalic and atraumatic.      Comments: Mild facial droop   Cardiovascular:      Rate and Rhythm: Rhythm irregular.   Pulmonary:      Effort: Pulmonary effort is normal.      Breath sounds: Normal breath sounds.   Abdominal:      Palpations: Abdomen is soft.   Musculoskeletal:         General: Normal range of motion.      Cervical back: Normal range of motion and neck supple.   Skin:     General: Skin is warm.      Capillary Refill: Capillary refill takes less than 2 seconds.   Neurological:      Mental Status: She is alert and oriented to person, place, and time.      Motor: Weakness (Right arm 3/5, left arm 5/5) present.      Comments: Right leg 4/5  Left leg 5/5     Psychiatric:         Mood and Affect: Mood normal.               Significant Labs: All pertinent labs within the past 24 hours have been reviewed.    Significant Imaging: I have reviewed all pertinent imaging results/findings within the past 24 hours.      Assessment & Plan  Acute ischemic stroke  CT showing left caudate, left putamen and left  corona radiata stroke.    Case discussed with Neurology at time of admission- out of window for stroke interventions  Given aspirin in ER.     MRI brain done- infarct in the head of the caudate nucleus, anterior limb of the internal capsule, putamen and external capsule and corona radiata on the left     MRA head/neck without vessel occlusion     Plan:  Received aspirin 325 mg in ED  Discussing with Neurology- they recommend holding off anticoagulation at this time.   Continue aspirin for now  BP slowly downtrending. Will not aggresively reduce BP.   Resumed metoprolol tartrate 50 Q8h; switch to succinate formulation  PT/OT/SLP  ECHO with bubble   Telemetry monitoring   Keeping NPO for now   Evaluate swallowing with SLP  D5LR overnight, stop if able to tolerate diet  Q4 Neurochecks   Atrial fibrillation with rapid ventricular response  EKG with Afib w RVR (in past in 2014 had Afib)    Start Metoprolol tartrate 50 mg Q8h PO; switch to succinate  Therapeutic Anticoagulation once okay per neurology.  Cardiology was consulted in ER  - rate controlled        RBBB  Known prior RBBB    EKG in Afib w RVR did show ST depressions in inferior leads and ST elevation in AVR. Repeat EKG patient at 9 pm patient in sinus rhythm with resolution of the depressions  No complaint of chest pain     Hypokalemia  Repleting with IV KCL   Primary hypertension  Patient's blood pressure range in the last 24 hours was: BP  Min: 153/91  Max: 193/126.The patient's inpatient anti-hypertensive regimen is listed below:  Current Antihypertensives  metoprolol tartrate (LOPRESSOR) tablet 50 mg, Every 8 hours, Oral  labetaloL injection 10 mg, Every 15 min PRN, Intravenous  metoprolol succinate (TOPROL-XL) 24 hr tablet 50 mg, Daily, Oral    Plan  - BP is uncontrolled, will adjust as follows   - metoprolol tartrate->succinate  - discussed with neurology- avoid rapid drop in BP  - permissive HTN for now; will gradually add in additional antihypertensive  agents    VTE Risk Mitigation (From admission, onward)           Ordered     Reason for No Pharmacological VTE Prophylaxis  Once        Question:  Reasons:  Answer:  Physician Provided (leave comment)  Comment:  pending Head MRI    04/26/25 2039     IP VTE HIGH RISK PATIENT  Once         04/26/25 2039     Place sequential compression device  Until discontinued         04/26/25 2039                    Discharge Planning   ALIYAH: 4/28/2025     Code Status: Full Code   Medical Readiness for Discharge Date:          Inpatient Upgrade Note    Aimee Dent has warranted treatment spanning two or more midnights of hospital level care for the management of  acute stroke . She continues to require further testing/imaging, monitoring of vital signs, and further evaluation by consultants. Her condition is also complicated by the following comorbidities: Hypertension and Afib .              Royce Padron MD  Department of Hospital Medicine   [facility]

## 2025-04-27 NOTE — CONSULTS
"NEUROLOGY CONSULT  EVALUATION    Reason for consult:Acute ischemic left stroke     Informant:  Patient        Other sources of information : relative(s), ER records, and primary team    CC:  "Right side weakness and fatigue"  Chief Complaint   Patient presents with    Extremity Weakness     BIB [facility] for right sided weakness first noticed ~Thursday morning when she woke up. EMS reports right sided facial droop. LKW Wednesday night.  per EMS.  VAN positive upon initial assessment, outside of activation window.        HPI:   Aimee Dent is a 84 y.o. year old with PMHx of HTN and paroxysmal Afib (not on AC) presents to ED on 4/26/25 via EMS for R-sided weakness and facial droop x 3 days      Per daughter and niece at bedside: LKW was Wed night (4/23/25). Pt was seen in the ED for generalized weakness, N/V. BP was 196/126. Initial ED workup was largely unremarkable, and pt was discharged with suspected gastroenteritis.  Over the past two days, pt has been mostly bedbound and fatigued. She has not taken her medications during this time. On Thurs AM (4/24), pt awoke with right arm and hand weakness, difficulty ambulating, and right-sided facial droop.    Upon interview: Pt reports persistent RSW, right hand pain, and generalized fatigue. She recalls being told she had Afib but is unclear about prior workup or treatment. Denies sensory deficits, speech disturbances, visual changes, falls, or other focal neurological symptoms.  Per daughter and niece at the bedside.  LKW was Wed night (4/23/25). She was seen in ED for generalized weakness, N/V. BP was  196/126. Initial workup in the emergency room was largely unremarkable. and was discharged with suspected gastroenteritis.     In the ED, /126, tachycardia , .  NIHSS = 2 for mild facial drop and RUE drift . CTH concerning for recent infarction in the left caudate, the left putamen, in left corona radiata and without hemorrhage. MRA head and " neck with no LVOs or flow limiting stenoses. MRI brain showed restricted diffusion in the region of low density seen on earlier CT compatible with central left infarct without evidence of hemorrhage. Pt was loaded with ASA and she fail the swallow test so she could not get Plavix load. TNK not given as he was out of the window. Mechanical thrombectomy not indicated as no LVOs.       ROS: Pertinent items are noted in HPI.    Histories:     Allergies:  Amoxicillin      Current Facility-Administered Medications   Medication Dose Route Frequency Provider Last Rate Last Admin    potassium chloride 10 mEq in 100 mL IVPB  10 mEq Intravenous Once Lyndsay Keller NP         Current Outpatient Medications   Medication Sig Dispense Refill    aspirin (ECOTRIN) 81 MG EC tablet Take 81 mg by mouth once daily.      atorvastatin (LIPITOR) 10 MG tablet Take 10 mg by mouth once daily.      calcium-vitamin D3 500 mg(1,250mg) -200 unit per tablet Take 1 tablet by mouth 2 (two) times daily with meals.      fish oil-omega-3 fatty acids 300-1,000 mg capsule Take 2 g by mouth once daily.      LIDOcaine (LIDODERM) 5 % Place 1 patch onto the skin once daily. Remove & Discard patch within 12 hours or as directed by MD 5 patch 0    metoprolol succinate (TOPROL-XL) 100 MG 24 hr tablet Take 100 mg by mouth once daily.      multivitamin with minerals tablet Take 1 tablet by mouth once daily.      ondansetron (ZOFRAN-ODT) 4 MG TbDL Take 1 tablet (4 mg total) by mouth every 6 (six) hours as needed (nausea). 20 tablet 0    potassium chloride SA (K-DUR,KLOR-CON) 20 MEQ tablet Take 20 mEq by mouth once daily.       triamterene-hydrochlorothiazide 37.5-25 mg (MAXZIDE-25) 37.5-25 mg per tablet Take 1 tablet by mouth once daily.       Past Medical/Surgical/Family History:  PMHx:   Past Medical History:   Diagnosis Date    Atrial fibrillation     Hypertension       Surgeries:   Past Surgical History:   Procedure Laterality Date    HYSTERECTOMY       TONSILLECTOMY        Family  Hx:   Family History   Problem Relation Name Age of Onset    Heart attack Father      Heart disease Brother      Heart disease Sister        Social History:    Social History     Occupational History    Not on file   Tobacco Use    Smoking status: Never    Smokeless tobacco: Never   Substance and Sexual Activity    Alcohol use: No    Drug use: No    Sexual activity: Not Currently        Current Evaluation:     Vital Signs:   Vitals:    04/26/25 1917   BP: (!) 156/90   Pulse: (!) 125   Resp: (!) 22   Temp:       -Mental status: Alert. Oriented. Speech fluent. Follows commands.  -Cranial nerves:   CN 2: Visual fields full.   CN 2/3: Pupils equal, round, and reactive bilateral.   CN 3/4/6/8: Extraocular movements intact.   CN 5: Facial sensation intact.   CN 7: Facial strength symmetric.   CN 9: Palate elevation symmetric.  CN 12: Tongue protrusion midline.  -Motor:  High tone over right side, unable to check the strength over RUE 2/2 pt in pain and flexed wrist. Otherwise  5/5  in left arms, legs.  -Reflexes: +2 and symmetric at biceps, brachioradialis, knees.   -Sensation: Intact to touch in arms, legs.  - Cerebellar: unable to do FTN on the Right side. Otherwise no dysmetria    NIH Stroke Scale       1a. Level of consciousness 0=alert; keenly responsive   1b. LOC questions 0=Answers both tasks correctly   1c. LOC commands 0=Answers both tasks correctly   2. Best gaze 0=normal   3. Visual 0=No visual loss   4.  Facial palsy 1=Minor paralysis (flattened nasolabial fold, asymmetric on smiling)   5.  Motor left arm 0=No drift, limb holds 90 (or 45) degrees for full 10 seconds   5b. Motor right arm 1=Drift, limb holds 90 (or 45) degrees but drifts down before full 10 seconds: does not hit bed   6a. Motor left leg 0=No drift, limb holds 90 (or 45) degrees for full 10 seconds   6b. Motor right leg 0=No drift, limb holds 90 (or 45) degrees for full 10 seconds   7. Limb ataxia Unable to do FTN  "with RUE   8. Sensory 0=Normal; no sensory loss   9. Best language 0=No aphasia, normal   10. Dysarthria 0=Normal   11.  Extinction and inattention 0=No abnormality                                   TOTAL: 2       LABORATORY STUDIES:    LAB RESULTS:  Recent Labs   Lab 04/23/25  0920 04/26/25  1757   WBC 5.46 7.38   HGB 13.9 13.5   HCT 41.5 40.5    299     Recent Labs   Lab 04/23/25  1026 04/26/25  1757    142   K 3.4* 3.0*    106   CO2 26 28   BUN 19 14   CREATININE 0.6 0.6   CALCIUM 9.2 8.9   MG 1.8  --    AST 20 19   ALT 12 13   ALKPHOS 53 52   BILITOT 0.8 0.9   ALBUMIN 3.8 3.6   LIPASE 21  --      Recent Labs   Lab 04/23/25  1026 04/23/25  1323 04/26/25  1757   INR  --   --  1.0   TROPONINI 0.015  --   --    CPK  --  146  --    Invalid input(s): "POCTABG"      Recent Labs   Lab 04/26/25  1856   COLORU Colorless*   APPEARANCEUA Clear   SPECGRAV 1.005   WBCUA 7*   BACTERIA Rare   NITRITE Negative   PROTEINUA Negative     CBC:   Lab Results   Component Value Date    WBC 7.38 04/26/2025    HGB 13.5 04/26/2025     04/26/2025       BMP:   Lab Results   Component Value Date     04/26/2025    K 3.0 (L) 04/26/2025    GLU 82 08/06/2024    CREATININE 0.6 04/26/2025    MG 1.8 04/23/2025       LFTs:   Lab Results   Component Value Date    AST 19 04/26/2025    ALKPHOS 52 04/26/2025    ALT 13 04/26/2025       DM: No results found for: "HGBA1C"    Thyroid:   Lab Results   Component Value Date    TSH 0.898 04/26/2025       FLP:   Lab Results   Component Value Date    CHOL 137 04/26/2025    HDL 35 (L) 04/26/2025     Imaging     CT Head Without Contrast   Final Result      Findings concerning for recent infarction in the left caudate, the left putamen, in left corona radiata.  Consideration for MRI of the brain is suggested for further evaluation.      Case discussed with GUERA Keller on 04/26/2025 at 18:20.         Electronically signed by: Jong Song MD   Date:    04/26/2025   Time:    18:22    "   MRI Brain Without Contrast    (Results Pending)   MRA Neck without contrast    (Results Pending)   MRA Brain without contrast    (Results Pending)        Assessment:        Aimee Dent is a 84 y.o. year old with PMHx of HTN and paroxysmal Afib (not on AC) presents to ED on 4/26/25 via EMS for R-sided weakness and facial droop x 3 days. Per daughter and niece at bedside: LKW was Wed night (4/23/25).    Impression:   #Moderate size left caudate, basal ganglia stroke in setting of HTN ( on arrival /126) ,and a fib.  NIHSS = 2 for mild facial drop and RUE drift . CTH concerning for recent infarction in the left caudate, the left putamen, in left corona radiata and without hemorrhage. MRA head and neck with no LVOs or flow limiting stenoses. MRI brain showed restricted diffusion in the region of low density seen on earlier CT compatible with central left infarct without evidence of hemorrhage. Pt was loaded with ASA and she failed the swallow test so she could not get Plavix load. TNK not given as he was out of the window. Mechanical thrombectomy not indicated as no LVOs.     Stroke scores  - Initial NIHSS: 2    Stroke workup:  Imaging Results:  CT Head without Contrast  No hemorrhage. Hypodensity over left caudate, putamen and basal ganglia  MRA Head & Neck   No significant intracranial or cervical arterial abnormalities by MRA.  MRI Brain without contrast  Restricted diffusion in the region of low density seen on earlier CT compatible with central left infarct without evidence of hemorrhage.  TTE : pending       Plan:  - Etiology likely small vessel occlusion  vs cardiac emboli; pending workup  - Neurochecks q4hr  - Recommend cardiac imaging w/ 2D Echo w/ bubble for any immediate high risk cardio embolic etiologies / needing considerations for AC     - Risk Stratification Labs:  *HbA1C: 5.2  *LDL: 62  *TSH: WNL  *Utox: pending  *HIV: pending  *Vitamin B-12  * Folate     - permissive HTN x 48 hrs post  index event / long term < 130/80  - Continue with  atorvastatin 80mg PO nightly & adjust based on lipid panel. Goal LDL <70  - Recommend initiating DAPT with ASA 81 mg PO daily and Plavix 75 mg PO daily. Given hx of Afib and CHADS-VASc score of 6, along with a HAS-BLED score of 4, pt would benefit from AC therapy. However, due to high bleeding risk, AC management is deferred to cardiology for further evaluation, pending TTE results  - Avoid protonix, as it can inhibit absorption of plavix, switch to omeprazole or any other GI protective medication   - PT/OT/ST eval & treat   - F/u PCP for risk factor modification monitoring  - Outpatient follow up with vascular neurology at [facility]   - Rest of care per primary team        Patient/Family Stroke Education  Discussed & stressed importance of medication compliance as HTN is leading risk factor for stroke.  Discussed importance of healthy diet/exercise for stroke risk reduction & medication compliance.  - Dietary modifications to reduce stroke:   -Mediterranean diet; daily fiber, green/black tea or coffee; limited EtOH  - Physical activity:   -Walking 2.5hr/week or Jogging 75min/week reduces overall mortality 20%   -Moderate exercise (MET ~4) for 12.5hr/week reduces overall mortality 35%  Discussed stroke signs/symptoms (weakness, sensory changes, acute vision changes, difficulty speaking) & instructed to call EMS if they present        Differential diagnosis was explained to the patient. All questions were answered. Patient understood and agreed to adhere to plan.     Please call with any questions regarding the recommendations made for the care of this patient. Please feel free to reach out for any worsening symptoms or any new neurological concerns. Thank you for allowing us to be part of this patient care team.     Case discussed with Dr. HERMELINDO García MD   [facility]  4/26/2025

## 2025-04-27 NOTE — ASSESSMENT & PLAN NOTE
CT showing left caudate, left putamen and left corona radiata stroke.    Case discussed with Neurology at time of admission- out of window for stroke interventions  Given aspirin in ER.     MRI brain done- infarct in the head of the caudate nucleus, anterior limb of the internal capsule, putamen and external capsule and corona radiata on the left     MRA head/neck without vessel occlusion     Plan:  Received aspirin 325 mg in ED  Discussing with Neurology- they recommend holding off anticoagulation at this time.   Continue aspirin for now  BP slowly downtrending. Will not aggresively reduce BP.   Resumed metoprolol tartrate 50 Q8h; switch to succinate formulation  PT/OT/SLP  ECHO with bubble   Telemetry monitoring   Keeping NPO for now   Evaluate swallowing with SLP  D5LR overnight, stop if able to tolerate diet  Q4 Neurochecks

## 2025-04-27 NOTE — PLAN OF CARE
Problem: Physical Therapy  Goal: Physical Therapy Goal  Description: Goals to be met by: 25     Patient will increase functional independence with mobility by performin. Supine to sit with Stand-by Assistance  2. Sit to supine with Stand-by Assistance  3. Sit to stand transfer with Stand-by Assistance with LRAD.   4. Bed to chair transfer with Stand-by Assistance using LRAD.   5. Gait  x 50 feet with Contact Guard Assistance using LRAD.     Outcome: Progressing     PT/OT co-evaluation completed due to anticipated complexity of pt's presentation. Pt's PLOF: Independent with no AD. Pt at this time requires MOD Ax2 with bed mobility and transfers to standing/short mobility. PT recommending high intensity therapy to assist pt in return to independent PLOF. Therapy will continue to progress pt as able.

## 2025-04-27 NOTE — PLAN OF CARE
Problem: Stroke, Ischemic (Includes Transient Ischemic Attack)  Goal: Optimal Coping  Outcome: Progressing  Goal: Effective Bowel Elimination  Outcome: Progressing  Goal: Optimal Cerebral Tissue Perfusion  Outcome: Progressing  Goal: Optimal Cognitive Function  Outcome: Progressing  Goal: Improved Communication Skills  Outcome: Progressing  Goal: Optimal Functional Ability  Outcome: Progressing  Goal: Optimal Nutrition Intake  Outcome: Progressing  Goal: Effective Oxygenation and Ventilation  Outcome: Progressing  Goal: Improved Sensorimotor Function  Outcome: Progressing  Goal: Safe and Effective Swallow  Outcome: Progressing  Goal: Effective Urinary Elimination  Outcome: Progressing     Problem: Fall Injury Risk  Goal: Absence of Fall and Fall-Related Injury  Outcome: Progressing     Problem: Skin Injury Risk Increased  Goal: Skin Health and Integrity  Outcome: Progressing

## 2025-04-27 NOTE — CONSULTS
[facility] Note     HPI/Interval:       Aimee Dent is a pleasant 84 y.o. female with problems noted below in A/P   Admitted for acute/subacute CVA.  Cardiology consulted for transient AFib while in the ED self cardioverted.  Per patient and family she has a presentation to the emergency room several days ago for feelings weakness/nausea/emesis.  Initial workup in the emergency room was largely unremarkable.  She re-presented yesterday to developing unilateral weakness and facial droop day prior.  Elevated blood pressure noted in the ED, imaging confirmed subacute/acute CVA.  While in the emergency room she developed AFib with RVR status post still push conversion several hours later.  Per patient she remembers being told she had AFib but never had any workup or education on it prior.  Denies ever being on any anticoagulation or having any cardiac procedures, or any other history.    History obtained by patient interview and supplemented by nursing documentation and chart review.   Objective   PMHx:  has a past medical history of Atrial fibrillation and Hypertension.   SurgHx:  has a past surgical history that includes Hysterectomy and Tonsillectomy.   FamHx: family history includes Heart attack in her father; Heart disease in her brother and sister.   SocialHx:  reports that she has never smoked. She has never used smokeless tobacco. She reports that she does not drink alcohol and does not use drugs.  Home Meds:  Current Outpatient Medications   Medication Instructions    aspirin (ECOTRIN) 81 mg, Oral, Daily    atorvastatin (LIPITOR) 10 mg, Oral, Daily    calcium-vitamin D3 500 mg(1,250mg) -200 unit per tablet 1 tablet, Oral, 2 times daily with meals    fish oil-omega-3 fatty acids 300-1,000 mg capsule 2 g, Oral, Daily    LIDOcaine (LIDODERM) 5 % 1 patch, Transdermal, Daily, Remove & Discard patch within 12 hours or as directed by MD    metoprolol succinate (TOPROL-XL) 100 mg, Oral, Daily     "multivitamin with minerals tablet 1 tablet, Oral, Daily    ondansetron (ZOFRAN-ODT) 4 mg, Oral, Every 6 hours PRN    potassium chloride SA (K-DUR,KLOR-CON) 20 MEQ tablet 20 mEq, Oral, Daily    triamterene-hydrochlorothiazide 37.5-25 mg (MAXZIDE-25) 37.5-25 mg per tablet 1 tablet, Oral, Daily     Review of Systems: Comprehensive ROS was performed and is negative unless otherwise noted in HPI.     Objective:   Last 24 Hour Vital Signs:  BP  Min: 156/90  Max: 193/126  Temp  Av.4 °F (36.9 °C)  Min: 98.4 °F (36.9 °C)  Max: 98.4 °F (36.9 °C)  Pulse  Av  Min: 116  Max: 161  Resp  Av  Min: 18  Max: 22  SpO2  Av.8 %  Min: 96 %  Max: 97 %  Height  Av' 4" (162.6 cm)  Min: 5' 4" (162.6 cm)  Max: 5' 4" (162.6 cm)  Weight  Av.5 kg (118 lb)  Min: 53.5 kg (118 lb)  Max: 53.5 kg (118 lb)  No intake/output data recorded.    Physical Examination:  Constitutional: NAD, Atraumatic   HEENT: MMM  Cardiovascular: RRR, no murmurs noted, no chest tenderness to palpation, 2+ radial pulses b/l  Pulmonary: normal respiratory effort, CTAB, no crackles, wheezes  Abdominal: S/NT/ND  Musculoskeletal: No lower extremity edema noted  Neurological: Alert & oriented to self, location, time and situation.  Right-sided weakness  Skin: W/D/I  Psych: Appropriate affect, normal mood    Laboratory:  Personally reviewed    Other Results:  TTE:  No results found for this or any previous visit.    Stress Testing:   No results found for this or any previous visit.     Coronary Angiogram:  No results found for this or any previous visit.      Time spent on this visit included personally:  -Reviewing Medical records & lab results  -Independently reviewing and interpreting (if not documented by myself) EKGs, echocardiograms, catherizations   -Obtaining a history, performing a relevant exam, counseling/educating the patient/family  -Documenting clinical information in the EMR including ordering of tests  -Care coordination and " communicating with other health care providers         Assessment/Plan:     Active Hospital Problems    Diagnosis    Acute ischemic stroke    Atrial fibrillation with rapid ventricular response    RBBB    Hypokalemia       Aimee Dent is a 84 y.o. female with hx of HTN, parox Afib (documented since 2014) p/w with R sided weakness/facial drop found to have acute CVA. Cardiology consulted for new onset afib w/ RVR  developed several hours after admission. Had recent ED visit 4/23 for weakness, noted to be in NSR at that time. Hypertensive on admission to 190/120. K 3.0. ECG afib w/ RBBB. Self cardioverted after dilt push.     -AC when cleared neurology   -TTE pending   -agree with high intensity statin increase atorva to 80, add lipid panel  -would change tartrate to succinate 50 XL   -BP control when ok from neuro/CVA standpoint  -OP f/u with cards for further afib management    Thank you for the opportunity to care for this patient. Please don't hesitate to reach out with any questions/concerns,  Speech recognition software used for parts of this note. Please excuse grammar, out of context phrases, and/or syntax issues.

## 2025-04-28 ENCOUNTER — TELEPHONE (OUTPATIENT)
Dept: CARDIOLOGY | Facility: CLINIC | Age: 85
End: 2025-04-28
Payer: MEDICARE

## 2025-04-28 PROBLEM — E87.6 HYPOKALEMIA: Status: RESOLVED | Noted: 2025-04-26 | Resolved: 2025-04-28

## 2025-04-28 PROBLEM — R53.81 DEBILITATED: Status: ACTIVE | Noted: 2025-04-28

## 2025-04-28 LAB
ABSOLUTE EOSINOPHIL (OHS): 0.13 K/UL
ABSOLUTE MONOCYTE (OHS): 0.71 K/UL (ref 0.3–1)
ABSOLUTE NEUTROPHIL COUNT (OHS): 6.29 K/UL (ref 1.8–7.7)
ALBUMIN SERPL BCP-MCNC: 3.2 G/DL (ref 3.5–5.2)
ALP SERPL-CCNC: 49 UNIT/L (ref 40–150)
ALT SERPL W/O P-5'-P-CCNC: 11 UNIT/L (ref 10–44)
ANION GAP (OHS): 8 MMOL/L (ref 8–16)
APICAL FOUR CHAMBER EJECTION FRACTION: 60 %
APICAL TWO CHAMBER EJECTION FRACTION: 63 %
ASCENDING AORTA: 3.1 CM
AST SERPL-CCNC: 17 UNIT/L (ref 11–45)
AV INDEX (PROSTH): 0.73
AV MEAN GRADIENT: 4 MMHG
AV PEAK GRADIENT: 7 MMHG
AV VALVE AREA BY VELOCITY RATIO: 2.4 CM²
AV VALVE AREA: 2.5 CM²
AV VELOCITY RATIO: 0.69
BASOPHILS # BLD AUTO: 0.03 K/UL
BASOPHILS NFR BLD AUTO: 0.4 %
BILIRUB SERPL-MCNC: 0.9 MG/DL (ref 0.1–1)
BSA FOR ECHO PROCEDURE: 1.53 M2
BUN SERPL-MCNC: 21 MG/DL (ref 8–23)
CALCIUM SERPL-MCNC: 9.1 MG/DL (ref 8.7–10.5)
CHLORIDE SERPL-SCNC: 109 MMOL/L (ref 95–110)
CO2 SERPL-SCNC: 23 MMOL/L (ref 23–29)
CREAT SERPL-MCNC: 0.6 MG/DL (ref 0.5–1.4)
CV ECHO LV RWT: 0.47 CM
DOP CALC AO PEAK VEL: 1.3 M/S
DOP CALC AO VTI: 27.6 CM
DOP CALC LVOT AREA: 3.5 CM2
DOP CALC LVOT DIAMETER: 2.1 CM
DOP CALC LVOT PEAK VEL: 0.9 M/S
DOP CALC LVOT STROKE VOLUME: 69.6 CM3
DOP CALC MV VTI: 19.3 CM
DOP CALCLVOT PEAK VEL VTI: 20.1 CM
E WAVE DECELERATION TIME: 238 MSEC
E/A RATIO: 0.64
E/E' RATIO: 10 M/S
ECHO LV POSTERIOR WALL: 0.9 CM (ref 0.6–1.1)
ERYTHROCYTE [DISTWIDTH] IN BLOOD BY AUTOMATED COUNT: 12 % (ref 11.5–14.5)
FRACTIONAL SHORTENING: 39.5 % (ref 28–44)
GFR SERPLBLD CREATININE-BSD FMLA CKD-EPI: >60 ML/MIN/1.73/M2
GLUCOSE SERPL-MCNC: 106 MG/DL (ref 70–110)
HCT VFR BLD AUTO: 40.6 % (ref 37–48.5)
HGB BLD-MCNC: 13.4 GM/DL (ref 12–16)
IMM GRANULOCYTES # BLD AUTO: 0.02 K/UL (ref 0–0.04)
IMM GRANULOCYTES NFR BLD AUTO: 0.3 % (ref 0–0.5)
INTERVENTRICULAR SEPTUM: 1.2 CM (ref 0.6–1.1)
IVC DIAMETER: 1.18 CM
LEFT ATRIUM AREA SYSTOLIC (APICAL 2 CHAMBER): 16.71 CM2
LEFT ATRIUM AREA SYSTOLIC (APICAL 4 CHAMBER): 15.01 CM2
LEFT ATRIUM VOLUME INDEX MOD: 25 ML/M2
LEFT ATRIUM VOLUME MOD: 39 ML
LEFT INTERNAL DIMENSION IN SYSTOLE: 2.3 CM (ref 2.1–4)
LEFT VENTRICLE DIASTOLIC VOLUME INDEX: 40.65 ML/M2
LEFT VENTRICLE DIASTOLIC VOLUME: 63 ML
LEFT VENTRICLE END DIASTOLIC VOLUME APICAL 2 CHAMBER: 47 ML
LEFT VENTRICLE END DIASTOLIC VOLUME APICAL 4 CHAMBER: 48.11 ML
LEFT VENTRICLE END SYSTOLIC VOLUME APICAL 2 CHAMBER: 43.21 ML
LEFT VENTRICLE END SYSTOLIC VOLUME APICAL 4 CHAMBER: 34.7 ML
LEFT VENTRICLE MASS INDEX: 81.2 G/M2
LEFT VENTRICLE SYSTOLIC VOLUME INDEX: 11 ML/M2
LEFT VENTRICLE SYSTOLIC VOLUME: 17 ML
LEFT VENTRICULAR INTERNAL DIMENSION IN DIASTOLE: 3.8 CM (ref 3.5–6)
LEFT VENTRICULAR MASS: 125.8 G
LV LATERAL E/E' RATIO: 9.2 M/S
LV SEPTAL E/E' RATIO: 11.5 M/S
LVED V (TEICH): 63.44 ML
LVES V (TEICH): 17.48 ML
LVOT MG: 1.31 MMHG
LVOT MV: 0.52 CM/S
LYMPHOCYTES # BLD AUTO: 0.59 K/UL (ref 1–4.8)
MCH RBC QN AUTO: 30.6 PG (ref 27–31)
MCHC RBC AUTO-ENTMCNC: 33 G/DL (ref 32–36)
MCV RBC AUTO: 93 FL (ref 82–98)
MV MEAN GRADIENT: 1 MMHG
MV PEAK A VEL: 0.72 M/S
MV PEAK E VEL: 0.46 M/S
MV PEAK GRADIENT: 4 MMHG
MV STENOSIS PRESSURE HALF TIME: 69.16 MS
MV VALVE AREA BY CONTINUITY EQUATION: 3.61 CM2
MV VALVE AREA P 1/2 METHOD: 3.18 CM2
NUCLEATED RBC (/100WBC) (OHS): 0 /100 WBC
OHS CV RV/LV RATIO: 0.74 CM
OHS LV EJECTION FRACTION SIMPSONS BIPLANE MOD: 63 %
PISA TR MAX VEL: 2.4 M/S
PLATELET # BLD AUTO: 283 K/UL (ref 150–450)
PMV BLD AUTO: 9.3 FL (ref 9.2–12.9)
POTASSIUM SERPL-SCNC: 4 MMOL/L (ref 3.5–5.1)
PROT SERPL-MCNC: 6.2 GM/DL (ref 6–8.4)
RA PRESSURE ESTIMATED: 3 MMHG
RA VOL SYS: 19.03 ML
RBC # BLD AUTO: 4.38 M/UL (ref 4–5.4)
RELATIVE EOSINOPHIL (OHS): 1.7 %
RELATIVE LYMPHOCYTE (OHS): 7.6 % (ref 18–48)
RELATIVE MONOCYTE (OHS): 9.1 % (ref 4–15)
RELATIVE NEUTROPHIL (OHS): 80.9 % (ref 38–73)
RIGHT ATRIAL AREA: 9.9 CM2
RIGHT ATRIUM VOLUME AREA LENGTH APICAL 4 CHAMBER: 18.72 ML
RIGHT VENTRICLE DIASTOLIC BASEL DIMENSION: 2.8 CM
RV TB RVSP: 5 MMHG
RV TISSUE DOPPLER FREE WALL SYSTOLIC VELOCITY 1 (APICAL 4 CHAMBER VIEW): 14.21 CM/S
SINUS: 3.1 CM
SODIUM SERPL-SCNC: 140 MMOL/L (ref 136–145)
STJ: 3 CM
TDI LATERAL: 0.05 M/S
TDI SEPTAL: 0.04 M/S
TDI: 0.05 M/S
TR MAX PG: 23 MMHG
TRICUSPID ANNULAR PLANE SYSTOLIC EXCURSION: 2.1 CM
TROPONIN I SERPL DL<=0.01 NG/ML-MCNC: 0.04 NG/ML
TV REST PULMONARY ARTERY PRESSURE: 26 MMHG
WBC # BLD AUTO: 7.77 K/UL (ref 3.9–12.7)
Z-SCORE OF LEFT VENTRICULAR DIMENSION IN END DIASTOLE: -1.66
Z-SCORE OF LEFT VENTRICULAR DIMENSION IN END SYSTOLE: -1.51

## 2025-04-28 PROCEDURE — 97116 GAIT TRAINING THERAPY: CPT

## 2025-04-28 PROCEDURE — 36415 COLL VENOUS BLD VENIPUNCTURE: CPT | Performed by: STUDENT IN AN ORGANIZED HEALTH CARE EDUCATION/TRAINING PROGRAM

## 2025-04-28 PROCEDURE — 97535 SELF CARE MNGMENT TRAINING: CPT

## 2025-04-28 PROCEDURE — 84484 ASSAY OF TROPONIN QUANT: CPT | Performed by: HOSPITALIST

## 2025-04-28 PROCEDURE — 51701 INSERT BLADDER CATHETER: CPT

## 2025-04-28 PROCEDURE — 27000221 HC OXYGEN, UP TO 24 HOURS

## 2025-04-28 PROCEDURE — 94761 N-INVAS EAR/PLS OXIMETRY MLT: CPT

## 2025-04-28 PROCEDURE — 97535 SELF CARE MNGMENT TRAINING: CPT | Mod: CO

## 2025-04-28 PROCEDURE — 99900035 HC TECH TIME PER 15 MIN (STAT)

## 2025-04-28 PROCEDURE — 99233 SBSQ HOSP IP/OBS HIGH 50: CPT | Mod: 25,,, | Performed by: NURSE PRACTITIONER

## 2025-04-28 PROCEDURE — 97530 THERAPEUTIC ACTIVITIES: CPT | Mod: CO

## 2025-04-28 PROCEDURE — 11000001 HC ACUTE MED/SURG PRIVATE ROOM

## 2025-04-28 PROCEDURE — 85025 COMPLETE CBC W/AUTO DIFF WBC: CPT | Performed by: STUDENT IN AN ORGANIZED HEALTH CARE EDUCATION/TRAINING PROGRAM

## 2025-04-28 PROCEDURE — 92523 SPEECH SOUND LANG COMPREHEN: CPT

## 2025-04-28 PROCEDURE — 25000003 PHARM REV CODE 250: Performed by: HOSPITALIST

## 2025-04-28 PROCEDURE — 25000003 PHARM REV CODE 250: Performed by: STUDENT IN AN ORGANIZED HEALTH CARE EDUCATION/TRAINING PROGRAM

## 2025-04-28 PROCEDURE — 80053 COMPREHEN METABOLIC PANEL: CPT | Performed by: STUDENT IN AN ORGANIZED HEALTH CARE EDUCATION/TRAINING PROGRAM

## 2025-04-28 PROCEDURE — 97530 THERAPEUTIC ACTIVITIES: CPT

## 2025-04-28 RX ORDER — NAPROXEN SODIUM 220 MG/1
81 TABLET, FILM COATED ORAL DAILY
Status: DISCONTINUED | OUTPATIENT
Start: 2025-04-28 | End: 2025-04-30 | Stop reason: HOSPADM

## 2025-04-28 RX ORDER — LOSARTAN POTASSIUM 25 MG/1
25 TABLET ORAL DAILY
Status: DISCONTINUED | OUTPATIENT
Start: 2025-04-28 | End: 2025-04-30 | Stop reason: HOSPADM

## 2025-04-28 RX ADMIN — APIXABAN 2.5 MG: 2.5 TABLET, FILM COATED ORAL at 08:04

## 2025-04-28 RX ADMIN — BISACODYL 10 MG: 10 SUPPOSITORY RECTAL at 05:04

## 2025-04-28 RX ADMIN — ACETAMINOPHEN 650 MG: 325 TABLET ORAL at 04:04

## 2025-04-28 RX ADMIN — ASPIRIN 81 MG CHEWABLE TABLET 81 MG: 81 TABLET CHEWABLE at 08:04

## 2025-04-28 RX ADMIN — DICLOFENAC SODIUM 2 G: 10 GEL TOPICAL at 08:04

## 2025-04-28 RX ADMIN — METOPROLOL SUCCINATE 100 MG: 50 TABLET, EXTENDED RELEASE ORAL at 08:04

## 2025-04-28 RX ADMIN — ATORVASTATIN CALCIUM 80 MG: 40 TABLET, FILM COATED ORAL at 08:04

## 2025-04-28 RX ADMIN — LOSARTAN POTASSIUM 25 MG: 25 TABLET, FILM COATED ORAL at 02:04

## 2025-04-28 NOTE — PLAN OF CARE
Problem: SLP  Goal: SLP Goal  Description: Short Term Goals:  1. Pt will participate in swallow eval to determine safest diet level.- MET  2. Pt will tolerate regular diet and thin liquids with no audible dysphagia signs-- MET  3. Pt will participate in speech/lang eval to determine deficits.- ongoing  4. Pt will recall unrelated words with mod cues after 1 and 3 minute delay  5. Pt will provide timely responses to complex problems  6. Pt will state  4-5 steps to completing ADL activities   7. Pt will recognize incongruities in pictures with mod cues.   Outcome: Progressing   ST POC established, will continue to address problem solving, attention to task, increasing response length and memory for recall of events. Pt will benefit from HIT level of therapy at [facility].

## 2025-04-28 NOTE — TELEPHONE ENCOUNTER
Patient has been scheduled for a hospital follow up appointment on 05/16/25 at 10 am at our [facility].     Domonique HANSEN       ----- Message from Ru Kessler MD sent at 4/27/2025 10:53 AM CDT -----  José Singh we schedule this pt to see GY or OTTONIEL in [place] ins 3-6 weeks? thx

## 2025-04-28 NOTE — PT/OT/SLP PROGRESS
Physical Therapy Treatment    Patient Name:  Aimee Dent   MRN:  [id number]    Recommendations:     Discharge Recommendations: High Intensity Therapy  Discharge Equipment Recommendations: bedside commode, to be determined by next level of care  Barriers to discharge:  limited functional endurance/independence    Assessment:     Aimee Dent is a 84 y.o. female admitted with a medical diagnosis of Acute ischemic stroke.  She presents with the following impairments/functional limitations: weakness, impaired endurance, impaired self care skills, impaired functional mobility, gait instability, impaired balance, decreased lower extremity function, decreased safety awareness, decreased upper extremity function, impaired cardiopulmonary response to activity.    PT/OT co-session to safely progress pt's functional mobility. Pt participates in bed mobility, seated RLE there-ex, transfers to standing and short distance mobility with use of RW. Therapy will continue to progress pt as able.     Rehab Prognosis: Good; patient would benefit from acute skilled PT services to address these deficits and reach maximum level of function.    Recent Surgery: * No surgery found *      Plan:     During this hospitalization, patient to be seen 5 x/week to address the identified rehab impairments via gait training, therapeutic activities, therapeutic exercises, neuromuscular re-education and progress toward the following goals:    Plan of Care Expires:  05/27/25    Subjective     Chief Complaint: overall flat affect during session  Patient/Family Comments/goals: not stated  Pain/Comfort:  Pain Rating 1: 0/10  Pain Rating Post-Intervention 1: 0/10      Objective:     Communicated with Nurse prior to session.  Patient found HOB elevated with bed alarm, oxygen, telemetry, SCD upon PT entry to room.     General Precautions: Standard, fall  Orthopedic Precautions: N/A  Braces: N/A  Respiratory Status: Nasal cannula, flow 1 L/min      Functional Mobility:  Bed Mobility:     Rolling Left:  minimum assistance  Rolling Right: contact guard assistance  Scooting: moderate assistance and of 2 persons to scoot in supine towards HOB  Supine to Sit: moderate assistance  Sit to Supine: minimum assistance  Transfers:     Sit to Stand:  minimum assistance and of 2 persons with rolling walker  Gait: ~4 BLE forward/retro/lateral steps with use of RW and MIN Ax2; seated break; additional 4 BLE steps in each direction.       AM-PAC 6 CLICK MOBILITY  Turning over in bed (including adjusting bedclothes, sheets and blankets)?: 2  Sitting down on and standing up from a chair with arms (e.g., wheelchair, bedside commode, etc.): 2  Moving from lying on back to sitting on the side of the bed?: 2  Moving to and from a bed to a chair (including a wheelchair)?: 2  Need to walk in hospital room?: 2  Climbing 3-5 steps with a railing?: 1  Basic Mobility Total Score: 11       Treatment & Education:  Pt appears fatigued/with flat affect.   Pt agreeable to perform mobility OOB.   Pt participates in seated RLE knee extension kicks with emphasis on quad contraction hold to increase strength.   Pt requires verbal cues to improve BUE hand positioning/use of RW (sequencing) with mobility as well as proper BLE stepping pattern; pt with noted R rotation in sitting/standing requires assistance and verbal cues to correct.   Pt educated on use of call button; pt understanding.     Patient left HOB elevated with all lines intact, call button in reach, bed alarm on, Nurse notified, and family present.    GOALS:   Multidisciplinary Problems       Physical Therapy Goals          Problem: Physical Therapy    Goal Priority Disciplines Outcome Interventions   Physical Therapy Goal     PT, PT/OT Progressing    Description: Goals to be met by: 25     Patient will increase functional independence with mobility by performin. Supine to sit with Stand-by Assistance  2. Sit to supine  with Stand-by Assistance  3. Sit to stand transfer with Stand-by Assistance with RW.   4. Bed to chair transfer with Stand-by Assistance using RW.   5. Gait  x 50 feet with Contact Guard Assistance using RW.                          DME Justifications:  This patient requires a bedside commode / 3 in 1 commode because they are physically incapable of utilizing their regular toilet facility for a 30-90 day period due to current non-ambulatory status. The patient will be confined to a single room or to one level of the home and there is no toilet on that level, or there are no toilet facilities in the home.      Time Tracking:     PT Received On: 04/28/25  PT Start Time: 1036     PT Stop Time: 1102  PT Total Time (min): 26 min With OT    Billable Minutes: Gait Training 10 and Therapeutic Activity 13    Treatment Type: Treatment  PT/PTA: PT     Number of PTA visits since last PT visit: 0     04/28/2025

## 2025-04-28 NOTE — ASSESSMENT & PLAN NOTE
Patient's blood pressure range in the last 24 hours was: BP  Min: 168/77  Max: 177/80.The patient's inpatient anti-hypertensive regimen is listed below:  Current Antihypertensives  labetaloL injection 10 mg, Every 15 min PRN, Intravenous  metoprolol succinate (TOPROL-XL) 24 hr tablet 100 mg, Daily, Oral  losartan tablet 25 mg, Daily, Oral    Plan  - BP is uncontrolled, will adjust as follows   - metoprolol tartrate->succinate; add losartan  - discussed with neurology- avoid rapid drop in BP  - gradually add in additional antihypertensive agents, will need further titration as outpatient

## 2025-04-28 NOTE — PLAN OF CARE
[facility] Plan of Care Note    Aimee Dent  1940  [id number]    84 year old female patient who presented to the ED on 4/26/25 via EMS for right sided weakness and facial droop for 3 days.  Last known well was Wednesday night (4/23/25).  Work up revealed a moderate left basal ganglia stroke likely secondary to small vessel disease risk factors and uncontrolled hypertension.  Though patient does have atrial fibrillation and is on eliquis.      Objective:  Vitals:    04/28/25 0358   BP:    Pulse: 71   Resp:    Temp:      Scheduled Meds:   aspirin  81 mg Oral Daily    atorvastatin  80 mg Oral Daily    clopidogreL  75 mg Oral Daily    diclofenac sodium  2 g Topical (Top) Daily    metoprolol succinate  100 mg Oral Daily     Laboratory Findings:   Reviewed labs through [facility]    A1c: 5.2  LDL: 62  TSH: within normal    Neuroimaging:   MRA Neck without contrast Result Date: 4/26/2025  No acute intracranial abnormality. No significant arterial abnormalities.     MRA Brain without contrast Result Date: 4/26/2025  No significant intracranial or cervical arterial abnormalities by MRA.     MRI Brain Without Contrast Result Date: 4/26/2025  Restricted diffusion in the region of low density seen on earlier CT compatible with central left infarct without evidence of hemorrhage.  Other causes of abnormal brain signal and restricted diffusion in this region are considered less likely. Neurology follow-up as clinically warranted. Chronic small vessel type changes and involutional changes elsewhere appropriate for age.     CT Head Without Contrast Result Date: 4/26/2025  Findings concerning for recent infarction in the left caudate, the left putamen, in left corona radiata.  Consideration for MRI of the brain is suggested for further evaluation.     Plan:   - MRI brain without contrast, with left basal ganglia infarct  - CT angiogram head and neck with contrast, no large vessel occlusion and or flow limiting stenosis  - TTE  with bubble study, pending with cardiology  - we recommend aspirin 81mg and plavix 75mg once daily for dual anti-platelet   - however if cardiology determines the need to continue eliquis we will recommend aspirin with eliquis  - will defer anticoagulation to cardiology   - cardiology recommending to continue with eliquis  - can start eliquis today, however any change in neuro exam and or mentation, STAT CT head without contrast  - CHADS2-VASc score of 6, along with a HAS-BLED score of 4  - patient would benefit from anticoagulation therapy, however the risk of major bleeding is also high  - lipitor 20mg once daily   - LDL 62  - recommend speech therapy, physical therapy, and occupational therapy  - recommend physical medicine and rehabilitation evaluation for rehabilitation  - recommend case management to help with placement assistance and home health needs  - outpatient follow up with vascular neurology at [facility]  - avoid protonix, as it can inhibit absorption of plavix, switch to omeprazole or any other GI protective medication     Differential diagnosis was explained to the patient. All questions were answered. Patient understood and agreed to adhere to plan.     Jacobo Gerardo MD  [facility] PGY-IV  [facility] Consult Service

## 2025-04-28 NOTE — CARE UPDATE
04/27/25 1900   PRE-TX-O2   Device (Oxygen Therapy) nasal cannula   $ Is the patient on Low Flow Oxygen? Yes   Flow (L/min) (Oxygen Therapy) 1   SpO2 98 %   Pulse Oximetry Type Intermittent   $ Pulse Oximetry - Multiple Charge Pulse Oximetry - Multiple

## 2025-04-28 NOTE — PT/OT/SLP EVAL
Speech Language Pathology Evaluation  Cognitive Communication    Patient Name:  Aimee Dent   MRN:  [id number]  Admitting Diagnosis: Acute ischemic stroke    Recommendations:     Recommendations:                General Recommendations:  Speech/language therapy and Cognitive-linguistic therapy  Diet recommendations:  Regular Diet - IDDSI Level 7, Thin liquids - IDDSI Level 0   Aspiration Precautions:  standard precautions     General Precautions: Standard, fall  Communication strategies:  flat affect    Assessment:     Aimee Dent is a 84 y.o. female admitted with CVA who presents with reduced attention to task, impaired execution of complex commands, impaired recall of new events, reduced vocal tone, reduced intonation, flat affect and impaired insight into deficits.     History per MD      Chief Complaint   Patient presents with    Extremity Weakness       BIB [facility] for right sided weakness first noticed ~Thursday morning when she woke up. EMS reports right sided facial droop. LKW Wednesday night.  per EMS.  VAN positive upon initial assessment, outside of activation window.         HPI: 85 yo F with hx of Hypertension, paroxysmal atrial fibrilaltion (not on AC), is bought in by EMS for Right hand weakness and facial droop, found to have acute stroke.     Patient notes she presented to ER on 4/23 with generalized weakness, nausea and vomiting. Her BP then was 196/126 per note. She notes she developed weakness in her right arm and hand on Thursday, and had difficulty walking. Her daughter and niece today decided to bring her to the ER. She hasn't been taking her medications since last 2 days as well, and is tired and in bed. She denies any headaches currently, chest pain or shortness of breath.   She denies visual deficit, cough, abdominal pain urinary discomfort.     Past Medical History:   Diagnosis Date    Atrial fibrillation     Hypertension        Past Surgical History:   Procedure Laterality  "Date    HYSTERECTOMY      TONSILLECTOMY         Social History: Patient lives in [facility] with ADLs and drives self to appts and to the grocery store.   Pt was living [facility] in her home prior to admit.     Prior diet: reg.thin    MRI: There is abnormal restricted diffusion involving the head of the caudate nucleus, anterior limb of the internal capsule, putamen and external capsule and corona radiata on the left.  No other restricted diffusion is evident.   Scattered punctate central and subcortical chronic small vessel gliotic signal changes on T2 and FLAIR sequences are noted.     Subjective     Pt seen at bedside for ongoing assessment of cognition and language.   Patient goals: "to get some sleep."     Pain/Comfort:  Pain Rating 1: 0/10  Location 1:  (R hand/wrist)  Pain Addressed 1: Nurse notified    Respiratory Status: room air     Objective:   Pt administered portions of the MS Aphasia Screening Test:    Cognitive Status:    Fair  Awake, alert,   Able to state  year, president and city she resides in   STM impaired recalled 1/3 words after 1 and 3 minute delay      Receptive Index: 44/50  Comprehension:   Impaired execution of complex commands  Can ID objects in field of 2  Can state function and purpose of object  Can read single words and phrases off whiteboard in room    Pragmatics:    Flat AFFECT upon presentation, fair eye contact, reduced loudness, reduced tone and overall monotone when talking    Expressive Language Index: 36/50  Verbal:    Fluent Verbal output, responses consist of single words, no elaboration, no description or descriptors terms provided when answering ?s  Responses are limited to nouns, no action verbs or adjectives provided when answering questions.       Motor Speech:  No dysarthric speech     Voice:   Low vocal tone present    Reading:   Read words on whiteboard (no eyeglasses)     Written Expression:   DNT    Treatment: SLP retroduced self to patient and family, discussed " reason for assessment, ST POC, goals and importance of staying motivated during acute stay. Reviewed importance of therapy and daily participation. Patient indicated understanding.   No questions asked by the patient following the session. Pt will benefit from [facility] level of therapy at [place].     Goals:   Multidisciplinary Problems       SLP Goals          Problem: SLP    Goal Priority Disciplines Outcome   SLP Goal     SLP Progressing   Description: Short Term Goals:  1. Pt will participate in swallow eval to determine safest diet level.- MET  2. Pt will tolerate regular diet and thin liquids with no audible dysphagia signs-- MET  3. Pt will participate in speech/lang eval to determine deficits.- ongoing  4. Pt will recall unrelated words with mod cues after 1 and 3 minute delay  5. Pt will provide timely responses to complex problems  6. Pt will state  4-5 steps to completing ADL activities   7. Pt will recognize incongruities in pictures with mod cues.                        Plan:   Patient to be seen:  3 x/week   Plan of Care expires:  05/26/25  Plan of Care reviewed with:  patient, daughter   SLP Follow-Up:  Yes       Discharge recommendations:  Therapy Intensity Recommendations at Discharge: High Intensity Therapy   Barriers to Discharge:  none    Time Tracking:     SLP Treatment Date:   04/28/25  Speech Start Time:  1130  Speech Stop Time:  1201     Speech Total Time (min):  31 min    Billable Minutes: Eval 19  and Self Care/Home Management Training 12    04/28/2025

## 2025-04-28 NOTE — PLAN OF CARE
Problem: Physical Therapy  Goal: Physical Therapy Goal  Description: Goals to be met by: 25     Patient will increase functional independence with mobility by performin. Supine to sit with Stand-by Assistance  2. Sit to supine with Stand-by Assistance  3. Sit to stand transfer with Stand-by Assistance with RW.   4. Bed to chair transfer with Stand-by Assistance using RW.   5. Gait  x 50 feet with Contact Guard Assistance using RW.     Outcome: Progressing     PT/OT co-session to safely progress pt's functional mobility. Pt participates in bed mobility, seated RLE there-ex, transfers to standing and short distance mobility with use of RW. Therapy will continue to progress pt as able.

## 2025-04-28 NOTE — PLAN OF CARE
went to meet with patient. Patient's sister daughter Natalie and Alia (relative) at bedside. Patient reports she is independent and lives at home with her grandson. She recently started to use a rolling walker at home. Patient still drives, but her family will transport if needed. Vascular Neurology follow-up appointment needed. Cardiology appointment scheduled.  discussed with patient and family therapy recommendations.  educated them both on IPR versus SNF and insurance approval. Patient confirmed with her family she would rather be placed closer to home, so she would rather SNF placement. She understands you receive more therapy at IPR too. Patient and Family stated their preference would be [facility].  to send referral to approval. Patient and Family encouraged to call with any questions or concerns.  will continue to follow patient through transitions of care and assist with any discharge needs.     LOCET completed and Assessment Pro done.     spoke with [facility] admissions. They do not have any female beds until maybe Thursday (they may have one Wednesday). I met with patient and family and updated them on above information. They would like  to send referral to [facility] bed. Referrals sent.     In-Basket message sent for Vascular Neurology follow-up.    Your fax has been successfully sent to [phone] at [phone].-[facility]     --Oracio with [facility] confirmed she received the packet. She will review and see if in network with patient's insurance. She will let me know.    Emergency Contacts    Name Relation Home Work Mobile   NATALIE SANDOVAL Daughter   [phone]   ALIA GAXIOLA Relative   [phone]     Future Appointments   Date Time Provider Department Center   5/16/2025 10:00 AM Alberto Galvan MD [facility] [place]         04/28/25 1236   Discharge Assessment   Assessment Type  Discharge Planning Assessment   Confirmed/corrected address, phone number and insurance Yes   Confirmed Demographics Correct on Facesheet   Source of Information patient;family;health record   People in Home grandchild(vishal)   Do you expect to return to your current living situation? No   Do you have help at home or someone to help you manage your care at home? Yes   Who are your caregiver(s) and their phone number(s)? ELADIO SANDOVAL Daughter   [phone]   Prior to hospitilization cognitive status: Alert/Oriented   Current cognitive status: Alert/Oriented   Walking or Climbing Stairs Difficulty yes   Walking or Climbing Stairs ambulation difficulty, requires equipment   Dressing/Bathing Difficulty no   Equipment Currently Used at Home walker, rolling   Do you take prescription medications? Yes   Do you have prescription coverage? Yes   Do you have any problems affording any of your prescribed medications? No   Is the patient taking medications as prescribed? yes   How do you get to doctors appointments? car, drives self;family or friend will provide   Are you on dialysis? No   Do you take coumadin? No   Discharge Plan A Skilled Nursing Facility   Discharge Plan B Home with family;Home Health   DME Needed Upon Discharge  none   Discharge Plan discussed with: Patient;Adult children   Transition of Care Barriers None   Physical Activity   On average, how many days per week do you engage in moderate to strenuous exercise (like a brisk walk)? Pt Declined   On average, how many minutes do you engage in exercise at this level? Pt Declined   Financial Resource Strain   How hard is it for you to pay for the very basics like food, housing, medical care, and heating? Not hard   Housing Stability   In the last 12 months, was there a time when you were not able to pay the mortgage or rent on time? N   At any time in the past 12 months, were you homeless or living in a shelter (including now)? N   Transportation Needs   In the  past 12 months, has lack of transportation kept you from medical appointments or from getting medications? no   In the past 12 months, has lack of transportation kept you from meetings, work, or from getting things needed for daily living? No   Food Insecurity   Within the past 12 months, you worried that your food would run out before you got the money to buy more. Never true   Within the past 12 months, the food you bought just didn't last and you didn't have money to get more. Never true   Stress   Do you feel stress - tense, restless, nervous, or anxious, or unable to sleep at night because your mind is troubled all the time - these days? Pt Declined   Social Isolation   How often do you feel lonely or isolated from those around you?  Patient declined     Tenisha Hampton RN    [phone]

## 2025-04-28 NOTE — SUBJECTIVE & OBJECTIVE
Review of Systems   Constitutional: Negative.   HENT: Negative.     Eyes: Negative.    Cardiovascular: Negative.  Negative for chest pain, leg swelling, near-syncope, orthopnea, palpitations, paroxysmal nocturnal dyspnea and syncope.   Respiratory: Negative.  Negative for shortness of breath.    Endocrine: Negative.    Hematologic/Lymphatic: Negative.    Gastrointestinal: Negative.    Genitourinary: Negative.    Neurological:  Positive for weakness.     Objective:     Vital Signs (Most Recent):  Temp: 98 °F (36.7 °C) (04/28/25 0716)  Pulse: 67 (04/28/25 0716)  Resp: 18 (04/28/25 0716)  BP: (!) 177/80 (04/28/25 0716)  SpO2: 96 % (04/28/25 0835) Vital Signs (24h Range):  Temp:  [97.9 °F (36.6 °C)-98.5 °F (36.9 °C)] 98 °F (36.7 °C)  Pulse:  [47-71] 67  Resp:  [18-20] 18  SpO2:  [95 %-98 %] 96 %  BP: (168-177)/(69-87) 177/80     Weight: 52.3 kg (115 lb 4.8 oz)  Body mass index is 19.79 kg/m².     SpO2: 96 %         Intake/Output Summary (Last 24 hours) at 4/28/2025 1016  Last data filed at 4/27/2025 1628  Gross per 24 hour   Intake 886.77 ml   Output 350 ml   Net 536.77 ml       Lines/Drains/Airways       Peripheral Intravenous Line  Duration                  Peripheral IV - Single Lumen 04/26/25 20 G 1 in Left Antecubital 2 days                       Physical Exam  Constitutional:       General: She is not in acute distress.     Appearance: She is not diaphoretic.   HENT:      Head: Atraumatic.   Eyes:      General:         Right eye: No discharge.         Left eye: No discharge.   Cardiovascular:      Rate and Rhythm: Normal rate and regular rhythm.   Pulmonary:      Effort: Pulmonary effort is normal.      Breath sounds: Normal breath sounds.   Abdominal:      General: Bowel sounds are normal.      Palpations: Abdomen is soft.   Skin:     General: Skin is warm and dry.   Neurological:      Mental Status: She is alert. Mental status is at baseline.            Significant Labs: BMP:   Recent Labs   Lab  "04/26/25 1757 04/27/25  0734 04/28/25  0539    143 140   K 3.0* 3.7 4.0    110 109   CO2 28 26 23   BUN 14 15 21   CREATININE 0.6 0.6 0.6   CALCIUM 8.9 8.7 9.1   MG 2.0  --   --    , CMP   Recent Labs   Lab 04/26/25 1757 04/27/25  0734 04/28/25  0539    143 140   K 3.0* 3.7 4.0    110 109   CO2 28 26 23   BUN 14 15 21   CREATININE 0.6 0.6 0.6   CALCIUM 8.9 8.7 9.1   ALBUMIN 3.6 3.1* 3.2*   BILITOT 0.9 1.1* 0.9   ALKPHOS 52 46 49   AST 19 15 17   ALT 13 12 11   ANIONGAP 8 7* 8   , CBC   Recent Labs   Lab 04/26/25 1757 04/27/25  0734 04/28/25  0539   WBC 7.38 7.72 7.77   HGB 13.5 12.6 13.4   HCT 40.5 38.5 40.6    265 283   , INR   Recent Labs   Lab 04/26/25 1757 04/27/25  0734   INR 1.0 1.0   , Lipid Panel   Recent Labs   Lab 04/26/25 1757   CHOL 137   HDL 35*   TRIG 56   CHOLHDL 25.5   , Troponin No results for input(s): "TROPONINIHS" in the last 48 hours., and All pertinent lab results from the last 24 hours have been reviewed.    Significant Imaging: Echocardiogram: Transthoracic echo (TTE) complete ([facility] Only): No results found for this or any previous visit.  "

## 2025-04-28 NOTE — ASSESSMENT & PLAN NOTE
Patient's blood pressure range in the last 24 hours was: BP  Min: 168/77  Max: 177/80.The patient's inpatient anti-hypertensive regimen is listed below:  Current Antihypertensives  labetaloL injection 10 mg, Every 15 min PRN, Intravenous  metoprolol succinate (TOPROL-XL) 24 hr tablet 100 mg, Daily, Oral    Plan  - Add ACEI/ARB once cleared from Neuro perspective

## 2025-04-28 NOTE — SUBJECTIVE & OBJECTIVE
Interval History:  Continues to have facial droop and right upper extremity weakness, unchanged from yesterday.  Discussed with Neurology; okay to start anticoagulant today from stroke perspective and it is indicated due to her AFib with elevated risk.    Review of Systems  Objective:     Vital Signs (Most Recent):  Temp: 98 °F (36.7 °C) (04/28/25 0716)  Pulse: 67 (04/28/25 0716)  Resp: 18 (04/28/25 0716)  BP: (!) 177/80 (04/28/25 0716)  SpO2: 96 % (04/28/25 0835) Vital Signs (24h Range):  Temp:  [97.9 °F (36.6 °C)-98.5 °F (36.9 °C)] 98 °F (36.7 °C)  Pulse:  [47-71] 67  Resp:  [18-20] 18  SpO2:  [95 %-98 %] 96 %  BP: (168-177)/(69-87) 177/80     Weight: 52.2 kg (115 lb)  Body mass index is 19.74 kg/m².    Intake/Output Summary (Last 24 hours) at 4/28/2025 1051  Last data filed at 4/27/2025 1628  Gross per 24 hour   Intake 886.77 ml   Output 350 ml   Net 536.77 ml         Physical Exam  Vitals reviewed.   HENT:      Head: Normocephalic and atraumatic.      Comments: Mild facial droop   Cardiovascular:      Rate and Rhythm: Rhythm irregular.   Pulmonary:      Effort: Pulmonary effort is normal.      Breath sounds: Normal breath sounds.   Abdominal:      Palpations: Abdomen is soft.   Musculoskeletal:         General: Normal range of motion.      Cervical back: Normal range of motion and neck supple.   Skin:     General: Skin is warm.      Capillary Refill: Capillary refill takes less than 2 seconds.   Neurological:      Mental Status: She is alert and oriented to person, place, and time.      Motor: Weakness (Right arm 3/5, left arm 5/5) present.      Comments: Right leg 4/5  Left leg 5/5     Psychiatric:         Mood and Affect: Mood normal.               Significant Labs: All pertinent labs within the past 24 hours have been reviewed.    Significant Imaging: I have reviewed all pertinent imaging results/findings within the past 24 hours.

## 2025-04-28 NOTE — ASSESSMENT & PLAN NOTE
EKG with Afib w RVR (in past in 2014 had Afib)    Started Metoprolol tartrate 50 mg Q8h PO; switched to succinate  Therapeutic Anticoagulation started today  Cardiology was consulted in ER  - rate controlled

## 2025-04-28 NOTE — ASSESSMENT & PLAN NOTE
Patient has paroxysmal (<7 days) atrial fibrillation. Patient is currently in sinus rhythm. [id number] Score: 4. The patients heart rate in the last 24 hours is as follows:  Pulse  Min: 47  Max: 71     Antiarrhythmics  metoprolol succinate (TOPROL-XL) 24 hr tablet 100 mg, Daily, Oral    Anticoagulants  apixaban tablet 2.5 mg, 2 times daily, Oral    Plan  - Replete lytes with a goal of K>4, Mg >2  - Patient is anticoagulated, see medications listed above.  - Patient's afib is currently controlled

## 2025-04-28 NOTE — ASSESSMENT & PLAN NOTE
CT showing left caudate, left putamen and left corona radiata stroke.    Case discussed with Neurology at time of admission- out of window for stroke interventions  Given aspirin in ER.     MRI brain done- infarct in the head of the caudate nucleus, anterior limb of the internal capsule, putamen and external capsule and corona radiata on the left     MRA head/neck without vessel occlusion     Plan:  Received aspirin 325 mg in ED, on ASA 81mg daily  - add eliquis for Afib, ok to resume in patient with moderate sized infarct; discussed bleeding risk with patient  BP slowly downtrending, add losartan today and continue metoprolol succinate   PT/OT/SLP recommend IPR  ECHO with bubble pending  - appreciate Neuro and Cardiology recs

## 2025-04-28 NOTE — PT/OT/SLP PROGRESS
Occupational Therapy   Treatment    Name: Aimee Dent  MRN: [id number]  Admitting Diagnosis:  Acute ischemic stroke       Recommendations:     Discharge Recommendations: High Intensity Therapy  Discharge Equipment Recommendations:  bedside commode, to be determined by next level of care  Barriers to discharge:  None    Assessment:     Aimee Dent is a 84 y.o. female with a medical diagnosis of Acute ischemic stroke. Performance deficits affecting function are weakness, impaired endurance, impaired self care skills, impaired functional mobility, gait instability, impaired balance, decreased upper extremity function, decreased coordination, decreased lower extremity function, decreased safety awareness, impaired coordination, impaired fine motor, decreased ROM, impaired cardiopulmonary response to activity.     Rehab Prognosis:  Good; patient would benefit from acute skilled OT services to address these deficits and reach maximum level of function.       Plan:     Patient to be seen 5 x/week to address the above listed problems via self-care/home management, therapeutic activities, therapeutic exercises, neuromuscular re-education  Plan of Care Expires: 05/27/25  Plan of Care Reviewed with: patient, family    Subjective     Chief Complaint: fatigue  Patient/Family Comments/goals: return to PLOF  Pain/Comfort:  Pain Rating 1: 0/10  Pain Rating Post-Intervention 1: 0/10    Objective:     Communicated with: Estela ruiz prior to session.  Patient found HOB elevated with bed alarm, telemetry, oxygen, SCD upon OT entry to room.    General Precautions: Standard, fall    Orthopedic Precautions:N/A  Braces: N/A  Respiratory Status: Nasal cannula, flow 1 L/min    Bed Mobility:    Rolling Left:  minimum assistance  Rolling Right: contact guard assistance  Scooting: moderate assistance and of 2 persons to scoot in supine towards HOB  Supine to Sit: moderate assistance  Sit to Supine: minimum assistance    Functional  Mobility/Transfers:  Patient completed Sit <> Stand Transfer with minimum assistance and of 2 persons  with  rolling walker   Functional Mobility: ambulated 4 steps fwd/bwd and lateral with David of 2 persons and VCs for RW mgmt/proximity; seated rest break between stand and steps    Activities of Daily Living:  Grooming: encouraged patient to use R hand, but stated she could not; Sup with L hand    Upper Body Dressing: minimum assistance and moderate assistance to doff nightgown and rekha clean one; educated on marcell dressing technique    [facility] 6 Click ADL: 15    Treatment & Education:  Educated on purpose/role of OT  Patient presents with very flat affect; reports fatigue  Max VCs for utilizing and integrating RUE in tasks  Completed stand and steps as above; stands with R lean and rotated to R; Min/Mod to correct  Built-up handles provided for utensils and educated on use    Patient left HOB elevated with all lines intact, call button in reach, bed alarm on, nsg notified, and family members present    GOALS:   Multidisciplinary Problems       Occupational Therapy Goals          Problem: Occupational Therapy    Goal Priority Disciplines Outcome Interventions   Occupational Therapy Goal     OT, PT/OT Progressing    Description: Goals to be met by: 05/26/25     Patient will increase functional independence with ADLs by performing:    UE Dressing with Set-up Assistance.  LE Dressing with Set-up Assistance and Assistive Devices as needed.  Grooming while standing at sink with Stand-by Assistance.  Toileting from bedside commode with Stand-by Assistance for hygiene and clothing management.   Toilet transfer to bedside commode with Stand-by Assistance.  Upper extremity exercise program 3x15 reps per handout, with assistance as needed.                         DME Justifications:  No DME recommended requiring DME justifications    Time Tracking:     OT Date of Treatment: 04/28/25  OT Start Time: 1037  OT Stop Time: 1111  OT  Total Time (min): 34 min partial overlap with PT    Billable Minutes:Self Care/Home Management 10  Therapeutic Activity 24        4/28/2025

## 2025-04-28 NOTE — PROGRESS NOTES
[facility] - Telemetry  Cardiology  Progress Note    Patient Name: Aimee Dent  MRN: [id number]  Admission Date: 4/26/2025  Hospital Length of Stay: 1 days  Code Status: Full Code   Attending Physician: Royce Padron,*   Primary Care Physician: Veena, Primary Doctor  Expected Discharge Date: 4/29/2025  Principal Problem:Acute ischemic stroke    Subjective:     Hospital Course:   04/28/2025 No cardiac complaints. Hemodynamically stable. On Eliquis and asa, TTE pending.         Review of Systems   Constitutional: Negative.   HENT: Negative.     Eyes: Negative.    Cardiovascular: Negative.  Negative for chest pain, leg swelling, near-syncope, orthopnea, palpitations, paroxysmal nocturnal dyspnea and syncope.   Respiratory: Negative.  Negative for shortness of breath.    Endocrine: Negative.    Hematologic/Lymphatic: Negative.    Gastrointestinal: Negative.    Genitourinary: Negative.    Neurological:  Positive for weakness.     Objective:     Vital Signs (Most Recent):  Temp: 98 °F (36.7 °C) (04/28/25 0716)  Pulse: 67 (04/28/25 0716)  Resp: 18 (04/28/25 0716)  BP: (!) 177/80 (04/28/25 0716)  SpO2: 96 % (04/28/25 0835) Vital Signs (24h Range):  Temp:  [97.9 °F (36.6 °C)-98.5 °F (36.9 °C)] 98 °F (36.7 °C)  Pulse:  [47-71] 67  Resp:  [18-20] 18  SpO2:  [95 %-98 %] 96 %  BP: (168-177)/(69-87) 177/80     Weight: 52.3 kg (115 lb 4.8 oz)  Body mass index is 19.79 kg/m².     SpO2: 96 %         Intake/Output Summary (Last 24 hours) at 4/28/2025 1016  Last data filed at 4/27/2025 1628  Gross per 24 hour   Intake 886.77 ml   Output 350 ml   Net 536.77 ml       Lines/Drains/Airways       Peripheral Intravenous Line  Duration                  Peripheral IV - Single Lumen 04/26/25 20 G 1 in Left Antecubital 2 days                       Physical Exam  Constitutional:       General: She is not in acute distress.     Appearance: She is not diaphoretic.   HENT:      Head: Atraumatic.   Eyes:      General:         Right eye: No  "discharge.         Left eye: No discharge.   Cardiovascular:      Rate and Rhythm: Normal rate and regular rhythm.   Pulmonary:      Effort: Pulmonary effort is normal.      Breath sounds: Normal breath sounds.   Abdominal:      General: Bowel sounds are normal.      Palpations: Abdomen is soft.   Skin:     General: Skin is warm and dry.   Neurological:      Mental Status: She is alert. Mental status is at baseline.            Significant Labs: BMP:   Recent Labs   Lab 04/26/25 1757 04/27/25 0734 04/28/25  0539    143 140   K 3.0* 3.7 4.0    110 109   CO2 28 26 23   BUN 14 15 21   CREATININE 0.6 0.6 0.6   CALCIUM 8.9 8.7 9.1   MG 2.0  --   --    , CMP   Recent Labs   Lab 04/26/25 1757 04/27/25  0734 04/28/25  0539    143 140   K 3.0* 3.7 4.0    110 109   CO2 28 26 23   BUN 14 15 21   CREATININE 0.6 0.6 0.6   CALCIUM 8.9 8.7 9.1   ALBUMIN 3.6 3.1* 3.2*   BILITOT 0.9 1.1* 0.9   ALKPHOS 52 46 49   AST 19 15 17   ALT 13 12 11   ANIONGAP 8 7* 8   , CBC   Recent Labs   Lab 04/26/25 1757 04/27/25  0734 04/28/25  0539   WBC 7.38 7.72 7.77   HGB 13.5 12.6 13.4   HCT 40.5 38.5 40.6    265 283   , INR   Recent Labs   Lab 04/26/25 1757 04/27/25  0734   INR 1.0 1.0   , Lipid Panel   Recent Labs   Lab 04/26/25 1757   CHOL 137   HDL 35*   TRIG 56   CHOLHDL 25.5   , Troponin No results for input(s): "TROPONINIHS" in the last 48 hours., and All pertinent lab results from the last 24 hours have been reviewed.    Significant Imaging: Echocardiogram: Transthoracic echo (TTE) complete (Cupid Only): No results found for this or any previous visit.  Assessment and Plan:     Brief HPI: Patient seen this morning on rounds without cardiac complaint. Stable. TTE pending.     * Acute ischemic stroke  Neurology on board  Patient on Eliquis and ASA  TTE with BBL pending           Primary hypertension  Patient's blood pressure range in the last 24 hours was: BP  Min: 168/77  Max: 177/80.The patient's inpatient " anti-hypertensive regimen is listed below:  Current Antihypertensives  labetaloL injection 10 mg, Every 15 min PRN, Intravenous  metoprolol succinate (TOPROL-XL) 24 hr tablet 100 mg, Daily, Oral    Plan  - Add ACEI/ARB once cleared from Neuro perspective      Atrial fibrillation with rapid ventricular response  Patient has paroxysmal (<7 days) atrial fibrillation. Patient is currently in sinus rhythm. [id number] Score: 4. The patients heart rate in the last 24 hours is as follows:  Pulse  Min: 47  Max: 71     Antiarrhythmics  metoprolol succinate (TOPROL-XL) 24 hr tablet 100 mg, Daily, Oral    Anticoagulants  apixaban tablet 2.5 mg, 2 times daily, Oral    Plan  - Replete lytes with a goal of K>4, Mg >2  - Patient is anticoagulated, see medications listed above.  - Patient's afib is currently controlled          VTE Risk Mitigation (From admission, onward)           Ordered     apixaban tablet 2.5 mg  2 times daily         04/28/25 6688     Reason for No Pharmacological VTE Prophylaxis  Once        Question:  Reasons:  Answer:  Physician Provided (leave comment)  Comment:  pending Head MRI    04/26/25 2039     IP VTE HIGH RISK PATIENT  Once         04/26/25 2039     Place sequential compression device  Until discontinued         04/26/25 2039                    Damion Cedillo NP  Cardiology  [facility] - Telemetry

## 2025-04-28 NOTE — PROGRESS NOTES
[facility] Medicine  Progress Note    Patient Name: Aimee Dent  MRN: [id number]  Patient Class: IP- Inpatient   Admission Date: 4/26/2025  Length of Stay: 1 days  Attending Physician: Royce Padron,*  Primary Care Provider: Veena, Primary Doctor        Subjective     Principal Problem:Acute ischemic stroke        HPI:  85 yo F with hx of Hypertension, paroxysmal atrial fibrilaltion (not on AC), is bought in by EMS for Right hand weakness and facial droop, found to have acute stroke.    Patient notes she presented to ER on 4/23 with generalized weakness, nausea and vomiting. Her BP then was 196/126 per note. She notes she developed weakness in her right arm and hand on Thursday, and had difficulty walking. Her daughter and niece today decided to bring her to the ER. She hasn't been taking her medications since last 2 days as well, and is tired and in bed. She denies any headaches currently, chest pain or shortness of breath.   She denies visual deficit, cough, abdominal pain urinary discomfort.     Home medications only significant for   Current Outpatient Medications   Medication Instructions    aspirin (ECOTRIN) 81 mg, Oral, Daily    atorvastatin (LIPITOR) 10 mg, Oral, Daily    calcium-vitamin D3 500 mg(1,250mg) -200 unit per tablet 1 tablet, Oral, 2 times daily with meals    fish oil-omega-3 fatty acids 300-1,000 mg capsule 2 g, Oral, Daily    LIDOcaine (LIDODERM) 5 % 1 patch, Transdermal, Daily, Remove & Discard patch within 12 hours or as directed by MD    metoprolol succinate (TOPROL-XL) 100 mg, Oral, Daily    multivitamin with minerals tablet 1 tablet, Oral, Daily    ondansetron (ZOFRAN-ODT) 4 mg, Oral, Every 6 hours PRN    potassium chloride SA (K-DUR,KLOR-CON) 20 MEQ tablet 20 mEq, Oral, Daily    triamterene-hydrochlorothiazide 37.5-25 mg (MAXZIDE-25) 37.5-25 mg per tablet 1 tablet, Oral, Daily         Overview/Hospital Course:  No notes on file    Interval History:  Continues to  have facial droop and right upper extremity weakness, unchanged from yesterday.  Discussed with Neurology; okay to start anticoagulant today from stroke perspective and it is indicated due to her AFib with elevated risk.    Review of Systems  Objective:     Vital Signs (Most Recent):  Temp: 98 °F (36.7 °C) (04/28/25 0716)  Pulse: 67 (04/28/25 0716)  Resp: 18 (04/28/25 0716)  BP: (!) 177/80 (04/28/25 0716)  SpO2: 96 % (04/28/25 0835) Vital Signs (24h Range):  Temp:  [97.9 °F (36.6 °C)-98.5 °F (36.9 °C)] 98 °F (36.7 °C)  Pulse:  [47-71] 67  Resp:  [18-20] 18  SpO2:  [95 %-98 %] 96 %  BP: (168-177)/(69-87) 177/80     Weight: 52.2 kg (115 lb)  Body mass index is 19.74 kg/m².    Intake/Output Summary (Last 24 hours) at 4/28/2025 1051  Last data filed at 4/27/2025 1628  Gross per 24 hour   Intake 886.77 ml   Output 350 ml   Net 536.77 ml         Physical Exam  Vitals reviewed.   HENT:      Head: Normocephalic and atraumatic.      Comments: Mild facial droop   Cardiovascular:      Rate and Rhythm: Rhythm irregular.   Pulmonary:      Effort: Pulmonary effort is normal.      Breath sounds: Normal breath sounds.   Abdominal:      Palpations: Abdomen is soft.   Musculoskeletal:         General: Normal range of motion.      Cervical back: Normal range of motion and neck supple.   Skin:     General: Skin is warm.      Capillary Refill: Capillary refill takes less than 2 seconds.   Neurological:      Mental Status: She is alert and oriented to person, place, and time.      Motor: Weakness (Right arm 3/5, left arm 5/5) present.      Comments: Right leg 4/5  Left leg 5/5     Psychiatric:         Mood and Affect: Mood normal.               Significant Labs: All pertinent labs within the past 24 hours have been reviewed.    Significant Imaging: I have reviewed all pertinent imaging results/findings within the past 24 hours.      Assessment & Plan  Acute ischemic stroke  CT showing left caudate, left putamen and left corona radiata  stroke.    Case discussed with Neurology at time of admission- out of window for stroke interventions  Given aspirin in ER.     MRI brain done- infarct in the head of the caudate nucleus, anterior limb of the internal capsule, putamen and external capsule and corona radiata on the left     MRA head/neck without vessel occlusion     Plan:  Received aspirin 325 mg in ED, on ASA 81mg daily  - add eliquis for Afib, ok to resume in patient with moderate sized infarct; discussed bleeding risk with patient  BP slowly downtrending, add losartan today and continue metoprolol succinate   PT/OT/SLP recommend IPR  ECHO with bubble pending  - appreciate Neuro and Cardiology recs  Atrial fibrillation with rapid ventricular response  EKG with Afib w RVR (in past in 2014 had Afib)    Started Metoprolol tartrate 50 mg Q8h PO; switched to succinate  Therapeutic Anticoagulation started today  Cardiology was consulted in ER  - rate controlled  RBBB  Known prior RBBB    EKG in Afib w RVR did show ST depressions in inferior leads and ST elevation in AVR. Repeat EKG patient at 9 pm patient in sinus rhythm with resolution of the depressions  No complaint of chest pain     Hypokalemia (Resolved: 4/28/2025)  Repleting with IV KCL   Primary hypertension  Patient's blood pressure range in the last 24 hours was: BP  Min: 168/77  Max: 177/80.The patient's inpatient anti-hypertensive regimen is listed below:  Current Antihypertensives  labetaloL injection 10 mg, Every 15 min PRN, Intravenous  metoprolol succinate (TOPROL-XL) 24 hr tablet 100 mg, Daily, Oral  losartan tablet 25 mg, Daily, Oral    Plan  - BP is uncontrolled, will adjust as follows   - metoprolol tartrate->succinate; add losartan  - discussed with neurology- avoid rapid drop in BP  - gradually add in additional antihypertensive agents, will need further titration as outpatient      Debilitated    - 2/2 stroke  - PT/OT rec IPR      VTE Risk Mitigation (From admission, onward)            Ordered     apixaban tablet 2.5 mg  2 times daily         04/28/25 0753     Reason for No Pharmacological VTE Prophylaxis  Once        Question:  Reasons:  Answer:  Physician Provided (leave comment)  Comment:  pending Head MRI    04/26/25 2039     IP VTE HIGH RISK PATIENT  Once         04/26/25 2039     Place sequential compression device  Until discontinued         04/26/25 2039                    Discharge Planning   ALIYAH: 4/29/2025     Code Status: Full Code   Medical Readiness for Discharge Date: 4/28/2025                   Royce Padron MD  Department of [facility] Medicine   [facility]

## 2025-04-29 LAB
OHS QRS DURATION: 130 MS
OHS QRS DURATION: 132 MS
OHS QRS DURATION: 138 MS
OHS QRS DURATION: 142 MS
OHS QTC CALCULATION: 488 MS
OHS QTC CALCULATION: 497 MS
OHS QTC CALCULATION: 510 MS
OHS QTC CALCULATION: 526 MS
SARS-COV-2 RDRP RESP QL NAA+PROBE: NEGATIVE

## 2025-04-29 PROCEDURE — 30200315 PPD INTRADERMAL TEST REV CODE 302: Performed by: FAMILY MEDICINE

## 2025-04-29 PROCEDURE — 25000003 PHARM REV CODE 250: Performed by: HOSPITALIST

## 2025-04-29 PROCEDURE — 97530 THERAPEUTIC ACTIVITIES: CPT | Mod: CO

## 2025-04-29 PROCEDURE — 99900035 HC TECH TIME PER 15 MIN (STAT)

## 2025-04-29 PROCEDURE — 86580 TB INTRADERMAL TEST: CPT | Performed by: FAMILY MEDICINE

## 2025-04-29 PROCEDURE — 97110 THERAPEUTIC EXERCISES: CPT | Mod: CQ

## 2025-04-29 PROCEDURE — 11000001 HC ACUTE MED/SURG PRIVATE ROOM

## 2025-04-29 PROCEDURE — 25000003 PHARM REV CODE 250: Performed by: STUDENT IN AN ORGANIZED HEALTH CARE EDUCATION/TRAINING PROGRAM

## 2025-04-29 PROCEDURE — 97535 SELF CARE MNGMENT TRAINING: CPT | Mod: CO

## 2025-04-29 PROCEDURE — 27000221 HC OXYGEN, UP TO 24 HOURS

## 2025-04-29 PROCEDURE — 97530 THERAPEUTIC ACTIVITIES: CPT | Mod: CQ

## 2025-04-29 PROCEDURE — 94761 N-INVAS EAR/PLS OXIMETRY MLT: CPT

## 2025-04-29 PROCEDURE — U0002 COVID-19 LAB TEST NON-CDC: HCPCS | Performed by: FAMILY MEDICINE

## 2025-04-29 PROCEDURE — 92507 TX SP LANG VOICE COMM INDIV: CPT

## 2025-04-29 RX ORDER — ATORVASTATIN CALCIUM 80 MG/1
80 TABLET, FILM COATED ORAL DAILY
Start: 2025-04-30 | End: 2026-04-30

## 2025-04-29 RX ORDER — BISACODYL 10 MG/1
10 SUPPOSITORY RECTAL DAILY PRN
Start: 2025-04-29

## 2025-04-29 RX ORDER — ACETAMINOPHEN 325 MG/1
650 TABLET ORAL EVERY 6 HOURS PRN
Start: 2025-04-29

## 2025-04-29 RX ORDER — LOSARTAN POTASSIUM 25 MG/1
25 TABLET ORAL DAILY
Start: 2025-04-30 | End: 2026-04-30

## 2025-04-29 RX ORDER — DICLOFENAC SODIUM 10 MG/G
2 GEL TOPICAL DAILY
Start: 2025-04-30

## 2025-04-29 RX ADMIN — DICLOFENAC SODIUM 2 G: 10 GEL TOPICAL at 10:04

## 2025-04-29 RX ADMIN — LOSARTAN POTASSIUM 25 MG: 25 TABLET, FILM COATED ORAL at 08:04

## 2025-04-29 RX ADMIN — APIXABAN 2.5 MG: 2.5 TABLET, FILM COATED ORAL at 08:04

## 2025-04-29 RX ADMIN — TUBERCULIN PURIFIED PROTEIN DERIVATIVE 5 UNITS: 5 INJECTION INTRADERMAL at 04:04

## 2025-04-29 RX ADMIN — METOPROLOL SUCCINATE 100 MG: 50 TABLET, EXTENDED RELEASE ORAL at 08:04

## 2025-04-29 RX ADMIN — ASPIRIN 81 MG CHEWABLE TABLET 81 MG: 81 TABLET CHEWABLE at 08:04

## 2025-04-29 RX ADMIN — ATORVASTATIN CALCIUM 80 MG: 40 TABLET, FILM COATED ORAL at 08:04

## 2025-04-29 NOTE — PLAN OF CARE
received voicemail from Oracio at [facility]. They are not in network with patient's insurance.     tried to contact [facility] to check on bed status (had declined patient no female beds until maybe Thursday).--Declined in [facility].    [facility] has accepted patient. Patient and Family adamantly refusing placement there. Patient/Family do not want to go far for placement. I told can try [facility] or Critical access hospital. They know [facility] does not accept insurance. Patient and Family would like for referral to be sent to [facility]. Referral sent via [facility].    In-Basket sent for Vascular Surgery follow-up.     I did ask patient and family again if would be agreeable to placement for IPR and/or SNF at [facility] but declined due to distance again.    1153-- unable to reach admissions at [facility]. Will try again.    1410-- spoke with Anil at [facility]. She is reviewing patient's information now.     did send referral to [facility] as requested by daughter as a back-up. I spoke with patient and she prefers facility closer. Awaiting response from [facility].     [facility] Declined. [facility] also declined. I spoke with daughter. She is just concerned with the distance of these facilities. I told her waiting to hear back from [facility]. She told me ok to send to [facility] as well as a back-up.    1521--Anil with [facility]'s reports they are able to accept patient. Patient also has a copayment prior to admission. They will require a Rapid COVID test and TB Skin Test. She will reach out to patient's daughter. I updated her will send updated clinicals and will be ready to discharge by tomorrow. PASRR/142 emailed to her.    Emergency Contacts    Name Relation Home Work ELADIO Busby Daughter   [phone]   JUSTYN GAXIOLA Relative   [phone]     Future Appointments   Date Time Provider Department  Center   5/16/2025 10:00 AM Alberto Galvan MD [facility] [place]       04/29/25 1103   Discharge Reassessment   Assessment Type Discharge Planning Reassessment   Did the patient's condition or plan change since previous assessment? No   Discharge Plan discussed with: Patient  (Relative)   Discharge Plan A Skilled Nursing Facility   Discharge Plan B Home with family;Home Health   DME Needed Upon Discharge  none   Transition of Care Barriers None   Why the patient remains in the hospital Requires continued medical care   Post-Acute Status   Post-Acute Authorization Placement   Post-Acute Placement Status Referrals Sent   Hospital Resources/Appts/Education Provided Appointments scheduled and added to AVS   Discharge Delays None known at this time     Tenisha Hampton RN    [phone]

## 2025-04-29 NOTE — PT/OT/SLP PROGRESS
Occupational Therapy   Treatment    Name: Aimee Dent  MRN: [id number]  Admitting Diagnosis:  Acute ischemic stroke       Recommendations:     Discharge Recommendations: High Intensity Therapy  Discharge Equipment Recommendations:  to be determined by next level of care  Barriers to discharge:  None    Assessment:     Aimee Dent is a 84 y.o. female with a medical diagnosis of Acute ischemic stroke. Performance deficits affecting function are weakness, impaired self care skills, impaired endurance, impaired functional mobility, gait instability, impaired balance, decreased coordination, decreased upper extremity function, decreased lower extremity function, decreased safety awareness, abnormal tone, decreased ROM, impaired coordination, impaired fine motor.     Rehab Prognosis:   Excellent ; patient would benefit from acute skilled OT services to address these deficits and reach maximum level of function.       Plan:     Patient to be seen 5 x/week to address the above listed problems via self-care/home management, therapeutic activities, therapeutic exercises, neuromuscular re-education  Plan of Care Expires: 05/27/25  Plan of Care Reviewed with: patient, family    Subjective     Chief Complaint: none  Patient/Family Comments/goals: return to PLOF  Pain/Comfort:  Pain Rating 1: 0/10    Objective:     Communicated with: Nuris ruiz prior to session.  Patient found HOB elevated with bed alarm, telemetry, SCD upon OT entry to room.    General Precautions: Standard, fall    Orthopedic Precautions:N/A  Braces: N/A  Respiratory Status: Room air     Occupational Performance:     Bed Mobility:    Patient completed Rolling/Turning to Right with contact guard assistance and with side rail  Patient completed Scooting/Bridging with contact guard assistance  Patient completed Supine to Sit with minimum assistance and with side rail     Functional Mobility/Transfers:  Patient completed Sit <> Stand Transfer with  minimum assistance  with  rolling walker   Patient completed Bed <> Chair Transfer using Step Transfer technique with minimum assistance with rolling walker  Functional Mobility: 4-5 turning steps to bedside chair; VCs for RW mgmt/proximity, as well as maintaining  on R side    Activities of Daily Living:  Grooming: set-up to complete oral care, facial hygiene and hair brushing seated in bedside chair      [facility] 6 Click ADL: 15    Treatment & Education:  Patient continues to present with flat affect; minimal conversation unless engaged  Noted more spontaneous use of RUE during tasks this date  Completed bed mob and transfer as noted above  Multiple pillows in chair to ensure comfort and appropriate posture/positioning to allow greatest use of RUE for functional tasks  G/H tasks as above; required significantly increased time, but did engage RUE in all tasks with effort  Educated on continuing ROM efforts of RUE, as well as encouragement to feed self with RUE using built-up handles provided yesterday  Discussed next level of care and expectations   All questions answered in OT scope    Patient left up in chair with all lines intact, call button in reach, nsg notified, and family present    GOALS:   Multidisciplinary Problems       Occupational Therapy Goals          Problem: Occupational Therapy    Goal Priority Disciplines Outcome Interventions   Occupational Therapy Goal     OT, PT/OT Progressing    Description: Goals to be met by: 05/26/25     Patient will increase functional independence with ADLs by performing:    UE Dressing with Set-up Assistance.  LE Dressing with Set-up Assistance and Assistive Devices as needed.  Grooming while standing at sink with Stand-by Assistance.  Toileting from bedside commode with Stand-by Assistance for hygiene and clothing management.   Toilet transfer to bedside commode with Stand-by Assistance.  Upper extremity exercise program 3x15 reps per handout, with assistance as  needed.                         DME Justifications:  No DME recommended requiring DME justifications    Time Tracking:     OT Date of Treatment: 04/29/25  OT Start Time: 1000  OT Stop Time: 1043  OT Total Time (min): 43 min    Billable Minutes:Self Care/Home Management 23  Therapeutic Activity 20       4/29/2025

## 2025-04-29 NOTE — PLAN OF CARE
Problem: Physical Therapy  Goal: Physical Therapy Goal  Description: Goals to be met by: 25     Patient will increase functional independence with mobility by performin. Supine to sit with Stand-by Assistance  2. Sit to supine with Stand-by Assistance  3. Sit to stand transfer with Stand-by Assistance with RW.   4. Bed to chair transfer with Stand-by Assistance using RW.   5. Gait  x 50 feet with Contact Guard Assistance using RW.     Outcome: Progressing

## 2025-04-29 NOTE — PT/OT/SLP PROGRESS
Physical Therapy Treatment    Patient Name:  Aimee Dent   MRN:  [id number]    Recommendations:     Discharge Recommendations: High Intensity Therapy  Discharge Equipment Recommendations: bedside commode, to be determined by next level of care  Barriers to discharge:  decreased mobility,strength and endurance    Assessment:     Aimee Dent is a 84 y.o. female admitted with a medical diagnosis of Acute ischemic stroke.  She presents with the following impairments/functional limitations: weakness, impaired endurance, impaired functional mobility, gait instability, impaired balance, decreased lower extremity function, decreased upper extremity function, decreased ROM, abnormal tone, impaired joint extensibility,pt with good participation and requires assistance with all mobility at this time,pt will benefit from high intensity therapy upon discharge.    Rehab Prognosis: Good; patient would benefit from acute skilled PT services to address these deficits and reach maximum level of function.    Recent Surgery: * No surgery found *      Plan:     During this hospitalization, patient to be seen 5 x/week to address the identified rehab impairments via gait training, therapeutic activities, therapeutic exercises, neuromuscular re-education and progress toward the following goals:    Plan of Care Expires:  05/27/25    Subjective     Chief Complaint: n/a  Patient/Family Comments/goals: pt ready to get in bed.  Pain/Comfort:  Pain Rating 1: 0/10      Objective:     Communicated with nsg prior to session.  Patient found up in chair with chair check, peripheral IV, telemetry upon PT entry to room.     General Precautions: Standard, fall  Orthopedic Precautions: N/A  Braces: N/A  Respiratory Status: Room air     Functional Mobility:  Bed Mobility:     Sit to Supine: minimum assistance  Transfers:     Sit to Stand:  moderate assistance with rolling walker  Balance: fair sitting balance      AM-PAC 6 CLICK MOBILITY  Turning  over in bed (including adjusting bedclothes, sheets and blankets)?: 2  Sitting down on and standing up from a chair with arms (e.g., wheelchair, bedside commode, etc.): 2  Moving from lying on back to sitting on the side of the bed?: 2  Moving to and from a bed to a chair (including a wheelchair)?: 2  Need to walk in hospital room?: 2  Climbing 3-5 steps with a railing?: 1  Basic Mobility Total Score: 11       Treatment & Education: amb 3-4 steps up/back with RW and Min/Mod A.      Patient left supine with all lines intact, call button in reach, bed alarm on, nsg notified, and family present..    GOALS: see general POC  Multidisciplinary Problems       Physical Therapy Goals          Problem: Physical Therapy    Goal Priority Disciplines Outcome Interventions   Physical Therapy Goal     PT, PT/OT Progressing    Description: Goals to be met by: 25     Patient will increase functional independence with mobility by performin. Supine to sit with Stand-by Assistance  2. Sit to supine with Stand-by Assistance  3. Sit to stand transfer with Stand-by Assistance with RW.   4. Bed to chair transfer with Stand-by Assistance using RW.   5. Gait  x 50 feet with Contact Guard Assistance using RW.                          DME Justifications:  No DME recommended requiring DME justifications    Time Tracking:     PT Received On: 25  PT Start Time: 1303     PT Stop Time: 1326  PT Total Time (min): 23 min     Billable Minutes: Therapeutic Activity 14 and Therapeutic Exercise 9    Treatment Type: Treatment  PT/PTA: PTA     Number of PTA visits since last PT visit: 2025

## 2025-04-29 NOTE — ASSESSMENT & PLAN NOTE
CT showing left caudate, left putamen and left corona radiata stroke.    Case discussed with Neurology at time of admission- out of window for stroke interventions  Given aspirin in ER.     MRI brain done- infarct in the head of the caudate nucleus, anterior limb of the internal capsule, putamen and external capsule and corona radiata on the left     MRA head/neck without vessel occlusion     Plan:  Received aspirin 325 mg in ED, on ASA 81mg daily  - add eliquis for Afib, ok to resume in patient with moderate sized infarct; discussed bleeding risk with patient  BP slowly downtrending, add losartan today and continue metoprolol succinate   PT/OT/SLP recommend IPR  ECHO with bubble - no intracardiac shunt at the atrial level.   - appreciate Neuro and Cardiology recs   Yes

## 2025-04-29 NOTE — PT/OT/SLP PROGRESS
"Speech Language Pathology Treatment    Patient Name:  Aimee Dent   MRN:  [id number]  Admitting Diagnosis: Acute ischemic stroke    Recommendations:                 Recommendations:                General Recommendations:  Speech/language therapy and Cognitive-linguistic therapy  Diet recommendations:  Regular Diet - IDDSI Level 7, Thin liquids - IDDSI Level 0   Aspiration Precautions:  standard precautions     General Precautions: Standard, fall  Communication strategies:  flat affect  Assessment:     Aimee Dent is a 84 y.o. female admitted with CVA who is making progress but continues to exhibit flat affect, reduced attention to task, delayed execution of complex commands, and impaired memory for learning new information. Pt today with some improvement in expressing ideas and increased initiation in conversational skills was noted.     Subjective     Pt found in room, seated in recliner. Dtr at bedside   Patient goals: "I guess I will be staying here."     Pain/Comfort:  Pain Rating 1: 0/10    Respiratory Status: room air     Objective:     Has the patient been evaluated by SLP for swallowing?   Yes  Keep patient NPO? No       Cognition/Communication: Pt seated in recliner and agreeable to try some paper and pen tasks. Pt is R hand dominant and did try to print her name with limited success. Pt was frustrated and expressed concern that she could not make any letters. Pt was agreeable to complete tasks verbally when orally presented to her. Pt able to discern differences in picture cards with 75% acc, pt completed category exclusion tasks with 80% acc, pt completed mental manipulation tasks with 80%acc. Pt able to provide specific word to target letter when given by this writer with 100% acc. Pt recalled 50% of details when orally presented with reading passage asking ?s about the short story.   Pt did smile and laugh at least once during session.   Pt left upright in recliner, call bell within reach. Dtr " remained in room. Discussed with both parties that pt is making progress with goals.     Goals:   Multidisciplinary Problems       SLP Goals          Problem: SLP    Goal Priority Disciplines Outcome   SLP Goal     SLP Progressing   Description: Short Term Goals:  1. Pt will participate in swallow eval to determine safest diet level.- MET  2. Pt will tolerate regular diet and thin liquids with no audible dysphagia signs-- MET  3. Pt will participate in speech/lang eval to determine deficits.- ongoing  4. Pt will recall unrelated words with mod cues after 1 and 3 minute delay  5. Pt will provide timely responses to complex problems  6. Pt will state  4-5 steps to completing ADL activities   7. Pt will recognize incongruities in pictures with mod cues.                        Plan:     Patient to be seen:  3 x/week   Plan of Care expires:  05/26/25  Plan of Care reviewed with:  patient, daughter   SLP Follow-Up:  Yes       Discharge recommendations:  High Intensity Therapy   Barriers to Discharge:  none    Time Tracking:     SLP Treatment Date:   04/29/25  Speech Start Time:  1138  Speech Stop Time:  1200     Speech Total Time (min):  22 min    Billable Minutes: Speech Therapy Individual 22    04/29/2025

## 2025-04-29 NOTE — PLAN OF CARE
Problem: Adult Inpatient Plan of Care  Goal: Plan of Care Review  Outcome: Progressing  Goal: Patient-Specific Goal (Individualized)  Outcome: Progressing  Goal: Absence of Hospital-Acquired Illness or Injury  Outcome: Progressing  Goal: Optimal Comfort and Wellbeing  Outcome: Progressing  Goal: Readiness for Transition of Care  Outcome: Progressing     Problem: Stroke, Ischemic (Includes Transient Ischemic Attack)  Goal: Optimal Coping  Outcome: Progressing  Goal: Effective Bowel Elimination  Outcome: Progressing  Goal: Optimal Cognitive Function  Outcome: Progressing  Goal: Improved Communication Skills  Outcome: Progressing  Goal: Optimal Functional Ability  Outcome: Progressing  Goal: Optimal Nutrition Intake  Outcome: Progressing     Patient rested most of shift, up in chair about 3 hours.

## 2025-04-29 NOTE — SUBJECTIVE & OBJECTIVE
Interval History:  Awake, alert, no new complaint,  Awaiting placement    Continue anticoagulant from stroke perspective and it is indicated due to her AFib with elevated risk.  Awaiting placement    Review of Systems   Constitutional:  Positive for activity change.     Objective:     Vital Signs (Most Recent):  Temp: 98.2 °F (36.8 °C) (04/29/25 1128)  Pulse: 61 (04/29/25 1128)  Resp: 18 (04/29/25 1128)  BP: (!) 144/80 (04/29/25 1128)  SpO2: (!) 94 % (04/29/25 1154) Vital Signs (24h Range):  Temp:  [97.4 °F (36.3 °C)-98.7 °F (37.1 °C)] 98.2 °F (36.8 °C)  Pulse:  [51-68] 61  Resp:  [16-20] 18  SpO2:  [94 %-99 %] 94 %  BP: (134-176)/(68-84) 144/80     Weight: 52.2 kg (115 lb 1.3 oz)  Body mass index is 19.75 kg/m².  No intake or output data in the 24 hours ending 04/29/25 1209        Physical Exam  Vitals reviewed.   HENT:      Head: Normocephalic and atraumatic.      Comments: Mild facial droop   Cardiovascular:      Rate and Rhythm: Rhythm irregular.   Pulmonary:      Effort: Pulmonary effort is normal.      Breath sounds: Normal breath sounds.   Abdominal:      Palpations: Abdomen is soft.   Musculoskeletal:         General: Normal range of motion.      Cervical back: Normal range of motion and neck supple.   Skin:     General: Skin is warm.      Capillary Refill: Capillary refill takes less than 2 seconds.   Neurological:      Mental Status: She is alert and oriented to person, place, and time.      Motor: Weakness (Right arm 3/5, left arm 5/5) present.      Comments: Right leg 4/5  Left leg 5/5     Psychiatric:         Mood and Affect: Mood normal.               Significant Labs: All pertinent labs within the past 24 hours have been reviewed.    Significant Imaging: I have reviewed all pertinent imaging results/findings within the past 24 hours.

## 2025-04-29 NOTE — PROGRESS NOTES
"[facility] - Telemetry  Adult Nutrition  Progress Note    SUMMARY       Recommendations    Recommendation:  1. Change diet to heart healthy.   2. Encourage intake at meals as tolerated. 3. Monitor weight/labs.   4. RD to follow to monitor po intake    Goals:  Pt will tolerate diet with at least 50% intake at meals by RD follow up  Nutrition Goal Status: new  Communication of RD Recs: reviewed with RN    Nutrition Discharge Planning  Nutrition Discharge Planning: Therapeutic diet (comments)  Therapeutic diet (comments): heart healthy    Assessment and Plan   No nutrition dx at this time    Malnutrition Assessment  Weight Loss (Malnutrition):  (4% x 9 months)       Reason for Assessment  Reason For Assessment: RD follow-up  Diagnosis: stroke/CVA  General Information Comments: Pt admitted with extremity weakness and facial droop. Pt on Regular diet. Reports fair intake at meals. Pt seems sad at visit. Denies difficulty chewing or swallowing. Noted 6lb weight loss x 9 months. Carlos 18-skin intact. Unable to assess NFPE at visit. Heart healthy diet handouts attached to d/c paperwork.    Past Medical History:   Diagnosis Date    Atrial fibrillation     Hypertension         Nutrition/Diet History  Food Preferences: no Confucianism or cultural food prefs identified  Spiritual, Cultural Beliefs, Jehovah's witness Practices, Values that Affect Care: no  Factors Affecting Nutritional Intake: None identified at this time    Anthropometrics  Height: 5' 4" (162.6 cm)  Height (inches): 64 in  Height Method: Stated  Weight: 52.2 kg (115 lb 1.3 oz)  Weight (lb): 115.08 lb  Weight Method: Bed Scale  Ideal Body Weight (IBW), Female: 120 lb  % Ideal Body Weight, Female (lb): 95.9 %  BMI (Calculated): 19.7  BMI Grade: 18.5-24.9 - normal  Usual Body Weight (UBW), k.5 kg (24)  % Usual Body Weight: 95.98  % Weight Change From Usual Weight: -4.22 %     Lab/Procedures/Meds  Pertinent Labs Reviewed: reviewed  Pertinent Labs Comments: Alb " 3.2L  Pertinent Medications Reviewed: reviewed  Pertinent Medications Comments: aspirin    Estimated/Assessed Needs  Weight Used For Calorie Calculations: 52.2 kg (115 lb 1.3 oz)  Energy Calorie Requirements (kcal): 1566 (30 kcal/kg)  Energy Need Method: Kcal/kg  Protein Requirements: 52g (1.0g/kg)  Weight Used For Protein Calculations: 52.2 kg (115 lb 1.3 oz)  Estimated Fluid Requirement Method: RDA Method  RDA Method (mL): 1566     Nutrition Prescription Ordered  Current Diet Order: Regular    Evaluation of Received Nutrient/Fluid Intake  I/O: 886/350  Energy Calories Required: not meeting needs  Protein Required: not meeting needs  Fluid Required: not meeting needs  Comments: LBM 4/28  % Intake of Estimated Energy Needs: 25 - 50 %  % Meal Intake: 25 - 50 %    Nutrition Risk  Level of Risk/Frequency of Follow-up:  (1x weekly)     Monitor and Evaluation  Monitor and Evaluation: Food and beverage intake     Nutrition Related Social Determinants of Health: SDOH: Adequate food in home environment     Nutrition Follow-Up  RD Follow-up?: Yes

## 2025-04-29 NOTE — ASSESSMENT & PLAN NOTE
Patient's blood pressure range in the last 24 hours was: BP  Min: 134/68  Max: 176/84.The patient's inpatient anti-hypertensive regimen is listed below:  Current Antihypertensives  labetaloL injection 10 mg, Every 15 min PRN, Intravenous  metoprolol succinate (TOPROL-XL) 24 hr tablet 100 mg, Daily, Oral  losartan tablet 25 mg, Daily, Oral    Plan  - BP is uncontrolled, will adjust as follows   - metoprolol tartrate->succinate; add losartan  - discussed with neurology- avoid rapid drop in BP  - gradually add in additional antihypertensive agents, will need further titration as outpatient

## 2025-04-29 NOTE — PLAN OF CARE
Recommendation:  1. Change diet to heart healthy.   2. Encourage intake at meals as tolerated. 3. Monitor weight/labs.   4. RD to follow to monitor po intake    Goals:  Pt will tolerate diet with at least 50% intake at meals by RD follow up  Nutrition Goal Status: new

## 2025-04-29 NOTE — PROGRESS NOTES
[facility] Medicine  Progress Note    Patient Name: Aimee Dent  MRN: [id number]  Patient Class: IP- Inpatient   Admission Date: 4/26/2025  Length of Stay: 2 days  Attending Physician: Marcela Condon*  Primary Care Provider: Veena, Primary Doctor        Subjective     Principal Problem:Acute ischemic stroke        HPI:  85 yo F with hx of Hypertension, paroxysmal atrial fibrilaltion (not on AC), is bought in by EMS for Right hand weakness and facial droop, found to have acute stroke.    Patient notes she presented to ER on 4/23 with generalized weakness, nausea and vomiting. Her BP then was 196/126 per note. She notes she developed weakness in her right arm and hand on Thursday, and had difficulty walking. Her daughter and niece today decided to bring her to the ER. She hasn't been taking her medications since last 2 days as well, and is tired and in bed. She denies any headaches currently, chest pain or shortness of breath.   She denies visual deficit, cough, abdominal pain urinary discomfort.     Home medications only significant for   Current Outpatient Medications   Medication Instructions    aspirin (ECOTRIN) 81 mg, Oral, Daily    atorvastatin (LIPITOR) 10 mg, Oral, Daily    calcium-vitamin D3 500 mg(1,250mg) -200 unit per tablet 1 tablet, Oral, 2 times daily with meals    fish oil-omega-3 fatty acids 300-1,000 mg capsule 2 g, Oral, Daily    LIDOcaine (LIDODERM) 5 % 1 patch, Transdermal, Daily, Remove & Discard patch within 12 hours or as directed by MD    metoprolol succinate (TOPROL-XL) 100 mg, Oral, Daily    multivitamin with minerals tablet 1 tablet, Oral, Daily    ondansetron (ZOFRAN-ODT) 4 mg, Oral, Every 6 hours PRN    potassium chloride SA (K-DUR,KLOR-CON) 20 MEQ tablet 20 mEq, Oral, Daily    triamterene-hydrochlorothiazide 37.5-25 mg (MAXZIDE-25) 37.5-25 mg per tablet 1 tablet, Oral, Daily         Overview/Hospital Course:  No notes on file    Interval History:  Awake,  alert, no new complaint,  Awaiting placement    Continue anticoagulant from stroke perspective and it is indicated due to her AFib with elevated risk.  Awaiting placement    Review of Systems   Constitutional:  Positive for activity change.     Objective:     Vital Signs (Most Recent):  Temp: 98.2 °F (36.8 °C) (04/29/25 1128)  Pulse: 61 (04/29/25 1128)  Resp: 18 (04/29/25 1128)  BP: (!) 144/80 (04/29/25 1128)  SpO2: (!) 94 % (04/29/25 1154) Vital Signs (24h Range):  Temp:  [97.4 °F (36.3 °C)-98.7 °F (37.1 °C)] 98.2 °F (36.8 °C)  Pulse:  [51-68] 61  Resp:  [16-20] 18  SpO2:  [94 %-99 %] 94 %  BP: (134-176)/(68-84) 144/80     Weight: 52.2 kg (115 lb 1.3 oz)  Body mass index is 19.75 kg/m².  No intake or output data in the 24 hours ending 04/29/25 1209        Physical Exam  Vitals reviewed.   HENT:      Head: Normocephalic and atraumatic.      Comments: Mild facial droop   Cardiovascular:      Rate and Rhythm: Rhythm irregular.   Pulmonary:      Effort: Pulmonary effort is normal.      Breath sounds: Normal breath sounds.   Abdominal:      Palpations: Abdomen is soft.   Musculoskeletal:         General: Normal range of motion.      Cervical back: Normal range of motion and neck supple.   Skin:     General: Skin is warm.      Capillary Refill: Capillary refill takes less than 2 seconds.   Neurological:      Mental Status: She is alert and oriented to person, place, and time.      Motor: Weakness (Right arm 3/5, left arm 5/5) present.      Comments: Right leg 4/5  Left leg 5/5     Psychiatric:         Mood and Affect: Mood normal.               Significant Labs: All pertinent labs within the past 24 hours have been reviewed.    Significant Imaging: I have reviewed all pertinent imaging results/findings within the past 24 hours.      Assessment & Plan  Acute ischemic stroke  CT showing left caudate, left putamen and left corona radiata stroke.    Case discussed with Neurology at time of admission- out of window for stroke  interventions  Given aspirin in ER.     MRI brain done- infarct in the head of the caudate nucleus, anterior limb of the internal capsule, putamen and external capsule and corona radiata on the left     MRA head/neck without vessel occlusion     Plan:  Received aspirin 325 mg in ED, on ASA 81mg daily  - add eliquis for Afib, ok to resume in patient with moderate sized infarct; discussed bleeding risk with patient  BP slowly downtrending, add losartan today and continue metoprolol succinate   PT/OT/SLP recommend IPR  ECHO with bubble - no intracardiac shunt at the atrial level.   - appreciate Neuro and Cardiology recs  Atrial fibrillation with rapid ventricular response  EKG with Afib w RVR (in past in 2014 had Afib)    Started Metoprolol tartrate 50 mg Q8h PO; switched to succinate  Therapeutic Anticoagulation started today  Cardiology was consulted in ER  - rate controlled  RBBB  Known prior RBBB    EKG in Afib w RVR did show ST depressions in inferior leads and ST elevation in AVR. Repeat EKG patient at 9 pm patient in sinus rhythm with resolution of the depressions  No complaint of chest pain     Primary hypertension  Patient's blood pressure range in the last 24 hours was: BP  Min: 134/68  Max: 176/84.The patient's inpatient anti-hypertensive regimen is listed below:  Current Antihypertensives  labetaloL injection 10 mg, Every 15 min PRN, Intravenous  metoprolol succinate (TOPROL-XL) 24 hr tablet 100 mg, Daily, Oral  losartan tablet 25 mg, Daily, Oral    Plan  - BP is uncontrolled, will adjust as follows   - metoprolol tartrate->succinate; add losartan  - discussed with neurology- avoid rapid drop in BP  - gradually add in additional antihypertensive agents, will need further titration as outpatient      Debilitated    - 2/2 stroke  - PT/OT rec IPR      VTE Risk Mitigation (From admission, onward)           Ordered     apixaban tablet 2.5 mg  2 times daily         04/28/25 8658     Reason for No Pharmacological  VTE Prophylaxis  Once        Question:  Reasons:  Answer:  Physician Provided (leave comment)  Comment:  pending Head MRI    04/26/25 2039     IP VTE HIGH RISK PATIENT  Once         04/26/25 2039     Place sequential compression device  Until discontinued         04/26/25 2039                    Discharge Planning   ALIYAH: 4/30/2025     Code Status: Full Code   Medical Readiness for Discharge Date: 4/28/2025  Discharge Plan A: Skilled Nursing Facility   Discharge Delays: None known at this time            Please place Justification for DME        Marcela Condon MD  Department of Hospital Medicine   [place] [facility]

## 2025-04-30 VITALS
BODY MASS INDEX: 19.64 KG/M2 | SYSTOLIC BLOOD PRESSURE: 149 MMHG | TEMPERATURE: 98 F | HEART RATE: 66 BPM | DIASTOLIC BLOOD PRESSURE: 73 MMHG | RESPIRATION RATE: 18 BRPM | WEIGHT: 115.06 LBS | OXYGEN SATURATION: 94 % | HEIGHT: 64 IN

## 2025-04-30 PROCEDURE — 25000003 PHARM REV CODE 250: Performed by: HOSPITALIST

## 2025-04-30 PROCEDURE — 25000003 PHARM REV CODE 250: Performed by: STUDENT IN AN ORGANIZED HEALTH CARE EDUCATION/TRAINING PROGRAM

## 2025-04-30 PROCEDURE — 99900035 HC TECH TIME PER 15 MIN (STAT)

## 2025-04-30 PROCEDURE — 97110 THERAPEUTIC EXERCISES: CPT | Mod: CQ

## 2025-04-30 PROCEDURE — 97116 GAIT TRAINING THERAPY: CPT | Mod: CQ

## 2025-04-30 PROCEDURE — 94761 N-INVAS EAR/PLS OXIMETRY MLT: CPT

## 2025-04-30 RX ADMIN — LOSARTAN POTASSIUM 25 MG: 25 TABLET, FILM COATED ORAL at 09:04

## 2025-04-30 RX ADMIN — ASPIRIN 81 MG CHEWABLE TABLET 81 MG: 81 TABLET CHEWABLE at 08:04

## 2025-04-30 RX ADMIN — ATORVASTATIN CALCIUM 80 MG: 40 TABLET, FILM COATED ORAL at 08:04

## 2025-04-30 RX ADMIN — APIXABAN 2.5 MG: 2.5 TABLET, FILM COATED ORAL at 08:04

## 2025-04-30 RX ADMIN — METOPROLOL SUCCINATE 100 MG: 50 TABLET, EXTENDED RELEASE ORAL at 08:04

## 2025-04-30 NOTE — DISCHARGE SUMMARY
[facility] Medicine  Discharge Summary      Patient Name: Aimee Dent  MRN: [id number]  VITOR: [id number]  Patient Class: IP- Inpatient  Admission Date: 4/26/2025  Hospital Length of Stay: 3 days  Discharge Date and Time: 4/30/2025  5:10 PM  Attending Physician: Marcela Condon*   Discharging Provider: Marcela Condon MD  Primary Care Provider: Veena, Primary Doctor    Primary Care Team: Networked reference to record PCT     HPI:   85 yo F with hx of Hypertension, paroxysmal atrial fibrilaltion (not on AC), is bought in by EMS for Right hand weakness and facial droop, found to have acute stroke.    Patient notes she presented to ER on 4/23 with generalized weakness, nausea and vomiting. Her BP then was 196/126 per note. She notes she developed weakness in her right arm and hand on Thursday, and had difficulty walking. Her daughter and niece today decided to bring her to the ER. She hasn't been taking her medications since last 2 days as well, and is tired and in bed. She denies any headaches currently, chest pain or shortness of breath.   She denies visual deficit, cough, abdominal pain urinary discomfort.     Home medications only significant for   Current Outpatient Medications   Medication Instructions    aspirin (ECOTRIN) 81 mg, Oral, Daily    atorvastatin (LIPITOR) 10 mg, Oral, Daily    calcium-vitamin D3 500 mg(1,250mg) -200 unit per tablet 1 tablet, Oral, 2 times daily with meals    fish oil-omega-3 fatty acids 300-1,000 mg capsule 2 g, Oral, Daily    LIDOcaine (LIDODERM) 5 % 1 patch, Transdermal, Daily, Remove & Discard patch within 12 hours or as directed by MD    metoprolol succinate (TOPROL-XL) 100 mg, Oral, Daily    multivitamin with minerals tablet 1 tablet, Oral, Daily    ondansetron (ZOFRAN-ODT) 4 mg, Oral, Every 6 hours PRN    potassium chloride SA (K-DUR,KLOR-CON) 20 MEQ tablet 20 mEq, Oral, Daily    triamterene-hydrochlorothiazide 37.5-25 mg (MAXZIDE-25) 37.5-25  mg per tablet 1 tablet, Oral, Daily         * No surgery found *      Hospital Course:   No notes on file     Goals of Care Treatment Preferences:  Code Status: Full Code      SDOH Screening:  The patient was screened for utility difficulties, food insecurity, transport difficulties, housing insecurity, and interpersonal safety and there were no concerns identified this admission.     Consults:   Consults (From admission, onward)          Status Ordering Provider     Inpatient consult to [facility]  Once        Provider:  Isabel García MD    Completed JOSEPH SÁNCHEZ     Inpatient consult to Registered Dietitian/Nutritionist  Once        Provider:  (Not yet assigned)    Completed JOSEPH SÁNCHEZ     IP consult to case management/social work  Once        Provider:  (Not yet assigned)    Completed JOSEPH SÁNCHEZ     IP consult to case management/social work  Once        Provider:  (Not yet assigned)    Completed ANTONI ERIC     Inpatient consult to [facility]  Once        Provider:  (Not yet assigned)    Completed JIMENEZ SERRA     Inpatient consult to Cardiology  Once        Provider:  (Not yet assigned)    Completed JIMENEZ SERRA            Assessment & Plan  Acute ischemic stroke  CT showing left caudate, left putamen and left corona radiata stroke.    Case discussed with Neurology at time of admission- out of window for stroke interventions  Given aspirin in ER.     MRI brain done- infarct in the head of the caudate nucleus, anterior limb of the internal capsule, putamen and external capsule and corona radiata on the left     MRA head/neck without vessel occlusion     Plan:  Received aspirin 325 mg in ED, on ASA 81mg daily  - add eliquis for Afib, ok to resume in patient with moderate sized infarct; discussed bleeding risk with patient  BP slowly downtrending, add losartan today and continue metoprolol succinate   PT/OT/SLP recommend IPR  ECHO with bubble - no  intracardiac shunt at the atrial level.   - appreciate Neuro and Cardiology recs  Atrial fibrillation with rapid ventricular response  EKG with Afib w RVR (in past in 2014 had Afib)    Started Metoprolol tartrate 50 mg Q8h PO; switched to succinate  Therapeutic Anticoagulation started today  Cardiology was consulted in ER  - rate controlled  RBBB  Known prior RBBB    EKG in Afib w RVR did show ST depressions in inferior leads and ST elevation in AVR. Repeat EKG patient at 9 pm patient in sinus rhythm with resolution of the depressions  No complaint of chest pain     Primary hypertension  Patient's blood pressure range in the last 24 hours was: BP  Min: 149/73  Max: 166/71.The patient's inpatient anti-hypertensive regimen is listed below:  Current Antihypertensives  labetaloL injection 10 mg, Every 15 min PRN, Intravenous  metoprolol succinate (TOPROL-XL) 24 hr tablet 100 mg, Daily, Oral  losartan tablet 25 mg, Daily, Oral  losartan (COZAAR) tablet, Daily, Oral    Plan  - BP is uncontrolled, will adjust as follows   - metoprolol tartrate->succinate; add losartan  - discussed with neurology- avoid rapid drop in BP  - gradually add in additional antihypertensive agents, will need further titration as outpatient      Debilitated    - 2/2 stroke  - PT/OT rec IPR      Final Active Diagnoses:    Diagnosis Date Noted POA    PRINCIPAL PROBLEM:  Acute ischemic stroke [I63.9] 04/26/2025 Yes    Debilitated [R53.81] 04/28/2025 Yes    Atrial fibrillation with rapid ventricular response [I48.91] 04/26/2025 Yes    RBBB [I45.10] 04/26/2025 Yes    Primary hypertension [I10] 04/26/2025 Yes      Problems Resolved During this Admission:    Diagnosis Date Noted Date Resolved POA    Hypokalemia [E87.6] 04/26/2025 04/28/2025 Yes       Discharged Condition: stable    Disposition: Skilled Nursing Facility    Follow Up:   Follow-up Information       [facility] VASCULAR NEUROLOGY Follow up.    Specialty: Vascular Neurology  Why: Vascular  Neurology Follow-Up Requested.  Contact information:  [address]  [facility] [address]  [phone]             [facility] Follow up.    Specialty: Skilled Nursing Facility  Why: Skilled Nursing Facility  Contact information:  [address] [facility] [address]  [phone]                         Patient Instructions:      Ambulatory referral/consult to Vascular Neurology   Standing Status: Future   Referral Priority: Routine Referral Type: Consultation   Referral Reason: Specialty Services Required   Requested Specialty: Vascular Neurology   Number of Visits Requested: 1       Significant Diagnostic Studies: N/A    Pending Diagnostic Studies:       None           Medications:  Reconciled Home Medications:      Medication List        START taking these medications      acetaminophen 325 MG tablet  Commonly known as: TYLENOL  Take 2 tablets (650 mg total) by mouth every 6 (six) hours as needed.     apixaban 2.5 mg Tab  Commonly known as: ELIQUIS  Take 1 tablet (2.5 mg total) by mouth 2 (two) times daily.     bisacodyL 10 mg Supp  Commonly known as: DULCOLAX  Place 1 suppository (10 mg total) rectally daily as needed (Until bowel movement if patient has no bowel movement for 2 days).     diclofenac sodium 1 % Gel  Commonly known as: VOLTAREN  Apply 2 g topically once daily. Apply to right hand     losartan 25 MG tablet  Commonly known as: COZAAR  Take 1 tablet (25 mg total) by mouth once daily.            CHANGE how you take these medications      atorvastatin 80 MG tablet  Commonly known as: LIPITOR  Take 1 tablet (80 mg total) by mouth once daily.  What changed:   medication strength  how much to take            CONTINUE taking these medications      aspirin 81 MG EC tablet  Commonly known as: ECOTRIN  Take 81 mg by mouth once daily.     calcium-vitamin D3 500 mg-5 mcg (200 unit) per tablet  Commonly known as: OS-ALBERTO 500 + D3  Take 1 tablet by mouth 2 (two)  times daily with meals.     fish oil-omega-3 fatty acids 300-1,000 mg capsule  Take 2 g by mouth once daily.     LIDOcaine 5 %  Commonly known as: LIDODERM  Place 1 patch onto the skin once daily. Remove & Discard patch within 12 hours or as directed by MD     metoprolol succinate 100 MG 24 hr tablet  Commonly known as: TOPROL-XL  Take 100 mg by mouth once daily.     multivitamin with minerals tablet  Take 1 tablet by mouth once daily.     ondansetron 4 MG Tbdl  Commonly known as: ZOFRAN-ODT  Take 1 tablet (4 mg total) by mouth every 6 (six) hours as needed (nausea).            STOP taking these medications      potassium chloride SA 20 MEQ tablet  Commonly known as: K-DUR,KLOR-CON     triamterene-hydrochlorothiazide 37.5-25 mg 37.5-25 mg per tablet  Commonly known as: MAXZIDE-25              Indwelling Lines/Drains at time of discharge:   Lines/Drains/Airways       None                   Time spent on the discharge of patient: 35 minutes         Marcela Condon MD  Department of Hospital Medicine  [place] [facility]

## 2025-04-30 NOTE — ASSESSMENT & PLAN NOTE
Patient's blood pressure range in the last 24 hours was: BP  Min: 149/73  Max: 166/71.The patient's inpatient anti-hypertensive regimen is listed below:  Current Antihypertensives  labetaloL injection 10 mg, Every 15 min PRN, Intravenous  metoprolol succinate (TOPROL-XL) 24 hr tablet 100 mg, Daily, Oral  losartan tablet 25 mg, Daily, Oral  losartan (COZAAR) tablet, Daily, Oral    Plan  - BP is uncontrolled, will adjust as follows   - metoprolol tartrate->succinate; add losartan  - discussed with neurology- avoid rapid drop in BP  - gradually add in additional antihypertensive agents, will need further titration as outpatient

## 2025-04-30 NOTE — PLAN OF CARE
spoke with Wyatttrevermakayla at [facility]. She requested facility orders for discharge today. She reports will speak with her DON for status on authorization.  to send her COVID test, TB Skin Test, and Chest X-Ray.     1045--Discharge orders noted. Awaiting facility orders from MD team to send.    Awaiting insurance auth.    1132-- met with patient and family at bedside. They are in agreement with the discharge plan. Patient Choice Form signed. Nurse reports can sit up in wheelchair. Wheelchair van to be set up when ready. Packet placed by chart.    1209--Facility orders and follow-up information sent via EPIC.    1258--Anil with [facility] aware orders sent to review.    7204--Anil with [facility] reports still waiting on auth.     spoke with Anil at [facility]. They did receive authorization. Nurse can call report to: Phone [phone] nurse. Nursing staff updated. Wheelchair Van requested.     spoke with daughter Natalie and updated her.    Emergency Contacts    Name Relation Home Work NATALIE Busby Daughter   [phone]   JUSTYN GAXIOLA Relative   [phone]     Future Appointments   Date Time Provider Department Center   5/16/2025 10:00 AM Alberto Galvan MD [facility] [place]      [facility] VASCULAR NEUROLOGY  Vascular Neurology [phone] [address] [place] [address]      Next Steps: Follow up  Instructions: Vascular Neurology Follow-Up Requested.    [facility] REHAB AND custodial  Skilled Nursing Facility [phone] [address] [address]     Next Steps: Follow up  Instructions: Skilled Nursing Facility      04/30/25 1045   Final Note   Assessment Type Final Discharge Note   Anticipated Discharge Disposition SNF   Hospital Resources/Appts/Education Provided Appointments scheduled and added to AVS   Post-Acute Status   Post-Acute Authorization  Placement   Post-Acute Placement Status Pending payor review/awaiting authorization (if required)   Discharge Delays (!) Ambulance Transport/Facility Transport     Tenisha Hampton RN    [phone]

## 2025-04-30 NOTE — PLAN OF CARE
[facility]    FACILITY TRANSFER ORDERS      Patient Name: Aimee Dent  YOB: 1940    PCP: No, Primary Doctor   PCP Address: None  PCP Phone Number: None  PCP Fax: None    Encounter Date: 04/30/2025    Admit to:  [facility]    Vital Signs:  Routine    Diagnoses:   Active Hospital Problems    Diagnosis  POA    *Acute ischemic stroke [I63.9]  Yes    Debilitated [R53.81]  Yes    Atrial fibrillation with rapid ventricular response [I48.91]  Yes    RBBB [I45.10]  Yes    Primary hypertension [I10]  Yes      Resolved Hospital Problems    Diagnosis Date Resolved POA    Hypokalemia [E87.6] 04/28/2025 Yes       Allergies:  Review of patient's allergies indicates:   Allergen Reactions    Amoxicillin Rash       Diet: regular diet    Activities: Activity as tolerated    Goals of Care Treatment Preferences:  Code Status: Full Code      Nursing:  Per facility protocol         CONSULTS:    Physical Therapy to evaluate and treat. , Occupational Therapy to evaluate and treat., and Speech Therapy to evaluate and treat for Cognition.        Medications: Review discharge medications with patient and family and provide education.         Medication List        START taking these medications      acetaminophen 325 MG tablet  Commonly known as: TYLENOL  Take 2 tablets (650 mg total) by mouth every 6 (six) hours as needed.     apixaban 2.5 mg Tab  Commonly known as: ELIQUIS  Take 1 tablet (2.5 mg total) by mouth 2 (two) times daily.     bisacodyL 10 mg Supp  Commonly known as: DULCOLAX  Place 1 suppository (10 mg total) rectally daily as needed (Until bowel movement if patient has no bowel movement for 2 days).     diclofenac sodium 1 % Gel  Commonly known as: VOLTAREN  Apply 2 g topically once daily. Apply to right hand     losartan 25 MG tablet  Commonly known as: COZAAR  Take 1 tablet (25 mg total) by mouth once daily.            CHANGE how you take these medications      atorvastatin 80 MG  tablet  Commonly known as: LIPITOR  Take 1 tablet (80 mg total) by mouth once daily.  What changed:   medication strength  how much to take            CONTINUE taking these medications      aspirin 81 MG EC tablet  Commonly known as: ECOTRIN  Take 81 mg by mouth once daily.     calcium-vitamin D3 500 mg-5 mcg (200 unit) per tablet  Commonly known as: OS-ALBERTO 500 + D3  Take 1 tablet by mouth 2 (two) times daily with meals.     fish oil-omega-3 fatty acids 300-1,000 mg capsule  Take 2 g by mouth once daily.     LIDOcaine 5 %  Commonly known as: LIDODERM  Place 1 patch onto the skin once daily. Remove & Discard patch within 12 hours or as directed by MD     metoprolol succinate 100 MG 24 hr tablet  Commonly known as: TOPROL-XL  Take 100 mg by mouth once daily.     multivitamin with minerals tablet  Take 1 tablet by mouth once daily.     ondansetron 4 MG Tbdl  Commonly known as: ZOFRAN-ODT  Take 1 tablet (4 mg total) by mouth every 6 (six) hours as needed (nausea).            STOP taking these medications      potassium chloride SA 20 MEQ tablet  Commonly known as: K-DUR,KLOR-CON     triamterene-hydrochlorothiazide 37.5-25 mg 37.5-25 mg per tablet  Commonly known as: MAXZIDE-25                Immunizations Administered as of 4/30/2025       No immunizations on file.                Some patients may experience side effects after vaccination.  These may include fever, headache, muscle or joint aches.  Most symptoms resolve with 24-48 hours and do not require urgent medical evaluation unless they persist for more than 72 hours or symptoms are concerning for an unrelated medical condition.          _________________________________  Marcela Condon MD  04/30/2025

## 2025-04-30 NOTE — PT/OT/SLP PROGRESS
Physical Therapy Treatment    Patient Name:  Aimee Dent   MRN:  [id number]    Recommendations:     Discharge Recommendations: High Intensity Therapy  Discharge Equipment Recommendations: bedside commode, to be determined by next level of care  Barriers to discharge:  decreased mobility,strength and endurance    Assessment:     Aimee Dent is a 84 y.o. female admitted with a medical diagnosis of Acute ischemic stroke.  She presents with the following impairments/functional limitations: weakness, impaired endurance, impaired functional mobility, gait instability, impaired balance, decreased lower extremity function, decreased upper extremity function, decreased ROM, impaired coordination, impaired fine motor,pt with good participation and some R ue return noted,pt requires assistance with all mobility at this time and will benefit from high intensity therapy upon discharge.    Rehab Prognosis: Good; patient would benefit from acute skilled PT services to address these deficits and reach maximum level of function.    Recent Surgery: * No surgery found *      Plan:     During this hospitalization, patient to be seen 5 x/week to address the identified rehab impairments via gait training, therapeutic activities, therapeutic exercises, neuromuscular re-education and progress toward the following goals:    Plan of Care Expires:  05/27/25    Subjective     Chief Complaint: n/a  Patient/Family Comments/goals: pt agreeable to up in chair.  Pain/Comfort:  Pain Rating 1: 0/10      Objective:     Communicated with nsg prior to session.  Patient found supine with chair check, peripheral IV, telemetry upon PT entry to room.     General Precautions: Standard, fall  Orthopedic Precautions: N/A  Braces: N/A  Respiratory Status: Room air     Functional Mobility:  Bed Mobility:     Supine to Sit: moderate assistance  Transfers:     Sit to Stand:  minimum assistance with rolling walker  Bed to Chair: minimum assistance with   rolling walker  using  ambulation  Gait: amb ~28' with RW and Min A with decreased pace.  Balance: fair sitting balance      AM-PAC 6 CLICK MOBILITY  Turning over in bed (including adjusting bedclothes, sheets and blankets)?: 3  Sitting down on and standing up from a chair with arms (e.g., wheelchair, bedside commode, etc.): 3  Moving from lying on back to sitting on the side of the bed?: 2  Moving to and from a bed to a chair (including a wheelchair)?: 3  Need to walk in hospital room?: 3  Climbing 3-5 steps with a railing?: 1  Basic Mobility Total Score: 15       Treatment & Education: le supine ex's x 10-12 reps inc: ap,qs,hs,abd/add,slr.      Patient left up in chair with all lines intact, call button in reach, nsg notified, and family present..    GOALS: see general POC  Multidisciplinary Problems       Physical Therapy Goals          Problem: Physical Therapy    Goal Priority Disciplines Outcome Interventions   Physical Therapy Goal     PT, PT/OT Progressing    Description: Goals to be met by: 25     Patient will increase functional independence with mobility by performin. Supine to sit with Stand-by Assistance  2. Sit to supine with Stand-by Assistance  3. Sit to stand transfer with Stand-by Assistance with RW.   4. Bed to chair transfer with Stand-by Assistance using RW.   5. Gait  x 50 feet with Contact Guard Assistance using RW.                          DME Justifications:  No DME recommended requiring DME justifications    Time Tracking:     PT Received On: 25  PT Start Time: 908     PT Stop Time: 933  PT Total Time (min): 25 min     Billable Minutes: Gait Training 15 and Therapeutic Exercise 10    Treatment Type: Treatment  PT/PTA: PTA     Number of PTA visits since last PT visit: 2     2025

## 2025-04-30 NOTE — SUBJECTIVE & OBJECTIVE
Interval History:  sleeping, no new complaint,    Continue anticoagulant from stroke perspective and it is indicated due to her AFib with elevated risk.  Awaiting placement    Review of Systems   Constitutional:  Positive for activity change.     Objective:     Vital Signs (Most Recent):  Temp: 98 °F (36.7 °C) (04/30/25 1132)  Pulse: 66 (04/30/25 1153)  Resp: 18 (04/30/25 1132)  BP: (!) 149/73 (04/30/25 1132)  SpO2: (!) 94 % (04/30/25 1132) Vital Signs (24h Range):  Temp:  [97.8 °F (36.6 °C)-98 °F (36.7 °C)] 98 °F (36.7 °C)  Pulse:  [60-75] 66  Resp:  [16-20] 18  SpO2:  [94 %-97 %] 94 %  BP: (149-166)/(71-81) 149/73     Weight: 52.2 kg (115 lb 1.3 oz)  Body mass index is 19.75 kg/m².  No intake or output data in the 24 hours ending 04/30/25 1204        Physical Exam  Vitals reviewed.   HENT:      Head: Normocephalic and atraumatic.      Comments: Mild facial droop   Cardiovascular:      Rate and Rhythm: Rhythm irregular.   Pulmonary:      Effort: Pulmonary effort is normal.      Breath sounds: Normal breath sounds.   Abdominal:      Palpations: Abdomen is soft.   Musculoskeletal:         General: Normal range of motion.      Cervical back: Normal range of motion and neck supple.   Skin:     General: Skin is warm.      Capillary Refill: Capillary refill takes less than 2 seconds.   Neurological:      Mental Status: She is alert and oriented to person, place, and time.      Motor: Weakness (Right arm 3/5, left arm 5/5) present.      Comments: Right leg 4/5  Left leg 5/5     Psychiatric:         Mood and Affect: Mood normal.               Significant Labs: All pertinent labs within the past 24 hours have been reviewed.    Significant Imaging: I have reviewed all pertinent imaging results/findings within the past 24 hours.

## 2025-04-30 NOTE — ASSESSMENT & PLAN NOTE
CT showing left caudate, left putamen and left corona radiata stroke.    Case discussed with Neurology at time of admission- out of window for stroke interventions  Given aspirin in ER.     MRI brain done- infarct in the head of the caudate nucleus, anterior limb of the internal capsule, putamen and external capsule and corona radiata on the left     MRA head/neck without vessel occlusion     Plan:  Received aspirin 325 mg in ED, on ASA 81mg daily  - add eliquis for Afib, ok to resume in patient with moderate sized infarct; discussed bleeding risk with patient  BP slowly downtrending, add losartan today and continue metoprolol succinate   PT/OT/SLP recommend IPR  ECHO with bubble - no intracardiac shunt at the atrial level.   - appreciate Neuro and Cardiology recs

## 2025-04-30 NOTE — PLAN OF CARE
Future Appointments   Date Time Provider Department Center   5/16/2025 10:00 AM Alberto Galvan MD [facility] CARDIO [place]        PROV [facility] VASCULAR NEUROLOGY  Vascular Neurology [phone] [address] [place] [address]      Next Steps: Follow up  Instructions: Vascular Neurology Follow-Up Requested.    [facility]  [facility] [phone] [address] [address]     Next Steps: Follow up  Instructions: Skilled Nursing Facility     Tenisha Hampton RN    [phone]

## 2025-04-30 NOTE — PROGRESS NOTES
[facility] Medicine  Progress Note    Patient Name: Aimee Dent  MRN: [id number]  Patient Class: IP- Inpatient   Admission Date: 4/26/2025  Length of Stay: 3 days  Attending Physician: Marcela Condon*  Primary Care Provider: Veena, Primary Doctor        Subjective     Principal Problem:Acute ischemic stroke        HPI:  83 yo F with hx of Hypertension, paroxysmal atrial fibrilaltion (not on AC), is bought in by EMS for Right hand weakness and facial droop, found to have acute stroke.    Patient notes she presented to ER on 4/23 with generalized weakness, nausea and vomiting. Her BP then was 196/126 per note. She notes she developed weakness in her right arm and hand on Thursday, and had difficulty walking. Her daughter and niece today decided to bring her to the ER. She hasn't been taking her medications since last 2 days as well, and is tired and in bed. She denies any headaches currently, chest pain or shortness of breath.   She denies visual deficit, cough, abdominal pain urinary discomfort.     Home medications only significant for   Current Outpatient Medications   Medication Instructions    aspirin (ECOTRIN) 81 mg, Oral, Daily    atorvastatin (LIPITOR) 10 mg, Oral, Daily    calcium-vitamin D3 500 mg(1,250mg) -200 unit per tablet 1 tablet, Oral, 2 times daily with meals    fish oil-omega-3 fatty acids 300-1,000 mg capsule 2 g, Oral, Daily    LIDOcaine (LIDODERM) 5 % 1 patch, Transdermal, Daily, Remove & Discard patch within 12 hours or as directed by MD    metoprolol succinate (TOPROL-XL) 100 mg, Oral, Daily    multivitamin with minerals tablet 1 tablet, Oral, Daily    ondansetron (ZOFRAN-ODT) 4 mg, Oral, Every 6 hours PRN    potassium chloride SA (K-DUR,KLOR-CON) 20 MEQ tablet 20 mEq, Oral, Daily    triamterene-hydrochlorothiazide 37.5-25 mg (MAXZIDE-25) 37.5-25 mg per tablet 1 tablet, Oral, Daily         Overview/Hospital Course:  No notes on file    Interval History:  sleeping, no  new complaint,    Continue anticoagulant from stroke perspective and it is indicated due to her AFib with elevated risk.  Awaiting placement    Review of Systems   Constitutional:  Positive for activity change.     Objective:     Vital Signs (Most Recent):  Temp: 98 °F (36.7 °C) (04/30/25 1132)  Pulse: 66 (04/30/25 1153)  Resp: 18 (04/30/25 1132)  BP: (!) 149/73 (04/30/25 1132)  SpO2: (!) 94 % (04/30/25 1132) Vital Signs (24h Range):  Temp:  [97.8 °F (36.6 °C)-98 °F (36.7 °C)] 98 °F (36.7 °C)  Pulse:  [60-75] 66  Resp:  [16-20] 18  SpO2:  [94 %-97 %] 94 %  BP: (149-166)/(71-81) 149/73     Weight: 52.2 kg (115 lb 1.3 oz)  Body mass index is 19.75 kg/m².  No intake or output data in the 24 hours ending 04/30/25 1204        Physical Exam  Vitals reviewed.   HENT:      Head: Normocephalic and atraumatic.      Comments: Mild facial droop   Cardiovascular:      Rate and Rhythm: Rhythm irregular.   Pulmonary:      Effort: Pulmonary effort is normal.      Breath sounds: Normal breath sounds.   Abdominal:      Palpations: Abdomen is soft.   Musculoskeletal:         General: Normal range of motion.      Cervical back: Normal range of motion and neck supple.   Skin:     General: Skin is warm.      Capillary Refill: Capillary refill takes less than 2 seconds.   Neurological:      Mental Status: She is alert and oriented to person, place, and time.      Motor: Weakness (Right arm 3/5, left arm 5/5) present.      Comments: Right leg 4/5  Left leg 5/5     Psychiatric:         Mood and Affect: Mood normal.               Significant Labs: All pertinent labs within the past 24 hours have been reviewed.    Significant Imaging: I have reviewed all pertinent imaging results/findings within the past 24 hours.      Assessment & Plan  Acute ischemic stroke  CT showing left caudate, left putamen and left corona radiata stroke.    Case discussed with Neurology at time of admission- out of window for stroke interventions  Given aspirin in ER.      MRI brain done- infarct in the head of the caudate nucleus, anterior limb of the internal capsule, putamen and external capsule and corona radiata on the left     MRA head/neck without vessel occlusion     Plan:  Received aspirin 325 mg in ED, on ASA 81mg daily  - add eliquis for Afib, ok to resume in patient with moderate sized infarct; discussed bleeding risk with patient  BP slowly downtrending, add losartan today and continue metoprolol succinate   PT/OT/SLP recommend IPR  ECHO with bubble - no intracardiac shunt at the atrial level.   - appreciate Neuro and Cardiology recs  Atrial fibrillation with rapid ventricular response  EKG with Afib w RVR (in past in 2014 had Afib)    Started Metoprolol tartrate 50 mg Q8h PO; switched to succinate  Therapeutic Anticoagulation started today  Cardiology was consulted in ER  - rate controlled  RBBB  Known prior RBBB    EKG in Afib w RVR did show ST depressions in inferior leads and ST elevation in AVR. Repeat EKG patient at 9 pm patient in sinus rhythm with resolution of the depressions  No complaint of chest pain     Primary hypertension  Patient's blood pressure range in the last 24 hours was: BP  Min: 149/73  Max: 166/71.The patient's inpatient anti-hypertensive regimen is listed below:  Current Antihypertensives  labetaloL injection 10 mg, Every 15 min PRN, Intravenous  metoprolol succinate (TOPROL-XL) 24 hr tablet 100 mg, Daily, Oral  losartan tablet 25 mg, Daily, Oral  losartan (COZAAR) tablet, Daily, Oral    Plan  - BP is uncontrolled, will adjust as follows   - metoprolol tartrate->succinate; add losartan  - discussed with neurology- avoid rapid drop in BP  - gradually add in additional antihypertensive agents, will need further titration as outpatient      Debilitated    - 2/2 stroke  - PT/OT rec IPR      VTE Risk Mitigation (From admission, onward)           Ordered     apixaban tablet 2.5 mg  2 times daily         04/28/25 4112     Reason for No  Pharmacological VTE Prophylaxis  Once        Question:  Reasons:  Answer:  Physician Provided (leave comment)  Comment:  pending Head MRI    04/26/25 2039     IP VTE HIGH RISK PATIENT  Once         04/26/25 2039     Place sequential compression device  Until discontinued         04/26/25 2039                    Discharge Planning   ALIYAH: 4/30/2025     Code Status: Full Code   Medical Readiness for Discharge Date: 4/28/2025  Discharge Plan A: Skilled Nursing Facility   Discharge Delays: (!) Ambulance Transport/Facility Transport            Please place Justification for DME        Marcela Condon MD  Department of Hospital Medicine   [facility] - Telemetry

## 2025-04-30 NOTE — PT/OT/SLP PROGRESS
Occupational Therapy  Visit Attempt    Patient Name:  Aimee Dent   MRN:  [id number]    Patient not seen today secondary to Patient fatigue -- Upon entry to room, patient just returning to bed. Had been up in bedside chair since earlier PT session. Asked OT to return later.    4/30/2025

## 2025-07-11 ENCOUNTER — OFFICE VISIT (OUTPATIENT)
Dept: CARDIOLOGY | Facility: CLINIC | Age: 85
End: 2025-07-11
Payer: MEDICARE

## 2025-07-11 VITALS
HEIGHT: 63 IN | DIASTOLIC BLOOD PRESSURE: 65 MMHG | SYSTOLIC BLOOD PRESSURE: 129 MMHG | HEART RATE: 63 BPM | WEIGHT: 108 LBS | BODY MASS INDEX: 19.14 KG/M2

## 2025-07-11 DIAGNOSIS — E78.2 MIXED HYPERLIPIDEMIA: ICD-10-CM

## 2025-07-11 DIAGNOSIS — I45.10 RBBB: ICD-10-CM

## 2025-07-11 DIAGNOSIS — I48.91 ATRIAL FIBRILLATION, UNSPECIFIED TYPE: Primary | ICD-10-CM

## 2025-07-11 DIAGNOSIS — I10 PRIMARY HYPERTENSION: ICD-10-CM

## 2025-07-11 PROCEDURE — 93000 ELECTROCARDIOGRAM COMPLETE: CPT | Mod: S$GLB,,, | Performed by: STUDENT IN AN ORGANIZED HEALTH CARE EDUCATION/TRAINING PROGRAM

## 2025-07-11 PROCEDURE — 99999 PR PBB SHADOW E&M-EST. PATIENT-LVL III: CPT | Mod: PBBFAC,,, | Performed by: STUDENT IN AN ORGANIZED HEALTH CARE EDUCATION/TRAINING PROGRAM

## 2025-07-11 RX ORDER — POTASSIUM CHLORIDE 20 MEQ/1
20 TABLET, EXTENDED RELEASE ORAL DAILY
COMMUNITY
Start: 2025-05-27

## 2025-07-11 RX ORDER — TRIAMTERENE AND HYDROCHLOROTHIAZIDE 37.5; 25 MG/1; MG/1
0.5 TABLET ORAL DAILY
COMMUNITY
Start: 2025-05-27

## 2025-07-11 NOTE — PROGRESS NOTES
Cardiology Clinic Visit    History of Present Illness:       Aimee Dent is a pleasant 84 y.o. female with PMHx of HTN, HLD, Afib on eliquis here post hospital discharge. Here with her family member visiting from out of state. She was admitted with right hand weakness and facial droop, found to have acute stroke which improved with PT. She is currently tolerating her meds without side effects. VSS. Voiced no CV complaints.       Previous HP  83 yo F with hx of Hypertension, paroxysmal atrial fibrilaltion (not on AC), is bought in by EMS for Right hand weakness and facial droop, found to have acute stroke.    Patient notes she presented to ER on 4/23 with generalized weakness, nausea and vomiting. Her BP then was 196/126 per note. She notes she developed weakness in her right arm and hand on Thursday, and had difficulty walking. Her daughter and niece today decided to bring her to the ER. She hasn't been taking her medications since last 2 days as well, and is tired and in bed. She denies any headaches currently, chest pain or shortness of breath.   She denies visual deficit, cough, abdominal pain urinary discomfort.     Echo 4/28/25    Left Ventricle: The left ventricle is normal in size. Mild septal thickening. There is normal systolic function. Quantitated ejection fraction is 63%. Grade I diastolic dysfunction.    Right Ventricle: The right ventricle is normal in size. Systolic function is normal.    Left Atrium: Agitated saline study of the atrial septum is negative after vasalva maneuver, suggesting absence of intracardiac shunt at the atrial level.    Aortic Valve: There is mild aortic regurgitation.    Tricuspid Valve: There is mild regurgitation.    Pulmonary Artery: The estimated pulmonary artery systolic pressure is 26 mmHg.       History obtained by patient interview and supplemented by nursing documentation and chart review.   PMHx:  has a past medical history of Atrial fibrillation and  "Hypertension.   SurgHx:  has a past surgical history that includes Hysterectomy and Tonsillectomy.   FamHx: family history includes Heart attack in her father; Heart disease in her brother and sister.   SocialHx:  reports that she has never smoked. She has never used smokeless tobacco. She reports that she does not drink alcohol and does not use drugs.  Home Meds:  Current Outpatient Medications   Medication Instructions    acetaminophen (TYLENOL) 650 mg, Oral, Every 6 hours PRN    apixaban (ELIQUIS) 2.5 mg, Oral, 2 times daily    aspirin (ECOTRIN) 81 mg, Daily    atorvastatin (LIPITOR) 80 mg, Oral, Daily    bisacodyL (DULCOLAX) 10 mg, Rectal, Daily PRN    calcium-vitamin D3 500 mg(1,250mg) -200 unit per tablet 1 tablet, 2 times daily with meals    diclofenac sodium (VOLTAREN) 2 g, Topical (Top), Daily, Apply to right hand    fish oil-omega-3 fatty acids 300-1,000 mg capsule 2 g, Daily    LIDOcaine (LIDODERM) 5 % 1 patch, Transdermal, Daily, Remove & Discard patch within 12 hours or as directed by MD    losartan (COZAAR) 25 mg, Oral, Daily    metoprolol succinate (TOPROL-XL) 100 mg, Daily    multivitamin with minerals tablet 1 tablet, Daily    ondansetron (ZOFRAN-ODT) 4 mg, Oral, Every 6 hours PRN    potassium chloride SA (K-DUR,KLOR-CON) 20 MEQ tablet 20 mEq, Daily    triamterene-hydrochlorothiazide 37.5-25 mg (MAXZIDE-25) 37.5-25 mg per tablet 0.5 tablets, Daily       Review of Systems: Comprehensive ROS was performed and is negative unless otherwise noted in HPI.   Objective   Objective/Exam:   /65 (BP Location: Right arm, Patient Position: Sitting)   Pulse 63   Ht 5' 3" (1.6 m)   Wt 49 kg (108 lb)   LMP  (LMP Unknown)   BMI 19.13 kg/m²    Wt Readings from Last 4 Encounters:   07/11/25 49 kg (108 lb)   04/29/25 52.2 kg (115 lb 1.3 oz)   04/23/25 53.5 kg (118 lb)   03/06/25 54.1 kg (119 lb 4.3 oz)      Constitutional: NAD, Atraumatic, Conversant   HEENT: MMM, Sclera anicteric, No JVD   Cardiovascular: " "RRR, no murmurs noted, no chest tenderness to palpation, 2+ radial pulses b/l  Pulmonary: normal respiratory effort, CTAB, no crackles, wheezes  Abdominal: S/NT/ND  Musculoskeletal: No lower extremity edema noted b/l  Neurological: No gross neurological deficits  Skin: W/D/I  Psych: Appropriate affect, normal mood  Labs/Imaging/Procedures   Personally reviewed  Lab Results   Component Value Date     05/02/2025     08/06/2024     06/22/2024    K 3.9 05/02/2025    K 3.9 08/06/2024    K 3.3 (L) 06/22/2024     05/02/2025     06/22/2024    CO2 29 05/02/2025    CO2 32 08/06/2024    CO2 23 06/22/2024    BUN 23 05/02/2025    CREATININE 0.6 05/02/2025    ANIONGAP 6 (L) 05/02/2025     Lab Results   Component Value Date    HGBA1C 5.8 (H) 04/26/2025     Lab Results   Component Value Date     (H) 06/22/2024      Lab Results   Component Value Date    WBC 4.86 05/02/2025    HGB 12.2 05/02/2025    HGB 13.5 06/22/2024    HCT 36.2 (L) 05/02/2025    HCT 39.1 06/22/2024     05/02/2025     06/22/2024    GRAN 4.8 06/22/2024    GRAN 87.7 (H) 06/22/2024     Lab Results   Component Value Date    CHOL 137 04/26/2025    HDL 35 (L) 04/26/2025    LDLCALC 90.8 04/26/2025    TRIG 56 04/26/2025     Lab Results   Component Value Date    TSH 0.898 04/26/2025     No results found for: "APOLIPOPROTE"  No results found for: "LIPOA"     TTE:  Results for orders placed during the hospital encounter of 04/26/25    Echo Saline Bubble? Yes    Interpretation Summary    Left Ventricle: The left ventricle is normal in size. Mild septal thickening. There is normal systolic function. Quantitated ejection fraction is 63%. Grade I diastolic dysfunction.    Right Ventricle: The right ventricle is normal in size. Systolic function is normal.    Left Atrium: Agitated saline study of the atrial septum is negative after vasalva maneuver, suggesting absence of intracardiac shunt at the atrial level.    Aortic Valve: " There is mild aortic regurgitation.    Tricuspid Valve: There is mild regurgitation.    Pulmonary Artery: The estimated pulmonary artery systolic pressure is 26 mmHg.    Stress Testing:   No results found for this or any previous visit.     Coronary Angiogram:  No results found for this or any previous visit.      -Reviewing Medical records & lab results  -Independently reviewing and interpreting (if not documented by myself) EKGs, echocardiograms, catherizations   -Obtaining a history, performing a relevant exam, counseling/educating the patient/family  -Documenting clinical information in the EMR including ordering of tests  -Care coordination and communicating with other health care providers       Problem List:     1. Atrial fibrillation, unspecified type    2. RBBB    3. Primary hypertension    4. Mixed hyperlipidemia      Assessment/Plan:   Afib -tolerating BB/OAC. No falls. Continue current management  RBBB stable  HTN at goal. Continue current regimen  HLD continue statin   CVA      Preventative Care:  Lipids:  PVD(V/A)      Patient expressed verbal understanding and agreed with the plan     Return sooner for concerns or questions. If symptoms persist go to the ED.  I have reviewed all pertinent data including patient's medical history in detail and updated the computerized patient record.     Total time spent counseling greater than fifty percent of total visit time.  Counseling included discussion regarding imaging findings, diagnosis, possibilities, treatment options, risks and benefits.      Thank you for the opportunity to care for this patient. Please don't hesitate to reach out with any questions/concerns         Alberto Sharma MD  Cardiovascular Disease; Interventional Cardiology  [facility]

## 2025-07-14 LAB
OHS QRS DURATION: 132 MS
OHS QTC CALCULATION: 450 MS